# Patient Record
Sex: FEMALE | Race: WHITE | Employment: FULL TIME | URBAN - METROPOLITAN AREA
[De-identification: names, ages, dates, MRNs, and addresses within clinical notes are randomized per-mention and may not be internally consistent; named-entity substitution may affect disease eponyms.]

---

## 2019-07-26 ENCOUNTER — HOSPITAL ENCOUNTER (EMERGENCY)
Age: 29
Discharge: HOME | End: 2019-07-26
Payer: COMMERCIAL

## 2019-07-26 VITALS
HEIGHT: 67 IN | SYSTOLIC BLOOD PRESSURE: 133 MMHG | OXYGEN SATURATION: 100 % | HEART RATE: 84 BPM | DIASTOLIC BLOOD PRESSURE: 78 MMHG | TEMPERATURE: 98.4 F | RESPIRATION RATE: 16 BRPM | BODY MASS INDEX: 44.73 KG/M2 | WEIGHT: 285 LBS

## 2019-07-26 DIAGNOSIS — S39.012A BACK STRAIN, INITIAL ENCOUNTER: Primary | ICD-10-CM

## 2019-07-26 LAB
BACTERIA: ABNORMAL /HPF
BILIRUBIN URINE: NEGATIVE
BLOOD, URINE: ABNORMAL
CLARITY: CLEAR
COLOR: YELLOW
EPITHELIAL CELLS, UA: ABNORMAL /HPF
GLUCOSE URINE: NEGATIVE MG/DL
HCG(URINE) PREGNANCY TEST: NEGATIVE
KETONES, URINE: NEGATIVE MG/DL
LEUKOCYTE ESTERASE, URINE: NEGATIVE
MICROSCOPIC EXAMINATION: YES
NITRITE, URINE: NEGATIVE
PH UA: 6 (ref 5–8)
PROTEIN UA: NEGATIVE MG/DL
RBC UA: ABNORMAL /HPF (ref 0–2)
SPECIFIC GRAVITY UA: 1.02 (ref 1–1.03)
URINE TYPE: ABNORMAL
UROBILINOGEN, URINE: 0.2 E.U./DL
WBC UA: ABNORMAL /HPF (ref 0–5)

## 2019-07-26 PROCEDURE — 81001 URINALYSIS AUTO W/SCOPE: CPT

## 2019-07-26 PROCEDURE — 99283 EMERGENCY DEPT VISIT LOW MDM: CPT

## 2019-07-26 PROCEDURE — 84703 CHORIONIC GONADOTROPIN ASSAY: CPT

## 2019-07-26 PROCEDURE — 6370000000 HC RX 637 (ALT 250 FOR IP): Performed by: NURSE PRACTITIONER

## 2019-07-26 RX ORDER — NAPROXEN 250 MG/1
500 TABLET ORAL ONCE
Status: COMPLETED | OUTPATIENT
Start: 2019-07-26 | End: 2019-07-26

## 2019-07-26 RX ORDER — NAPROXEN 500 MG/1
500 TABLET ORAL 2 TIMES DAILY
Qty: 30 TABLET | Refills: 0 | Status: SHIPPED | OUTPATIENT
Start: 2019-07-26 | End: 2019-08-12 | Stop reason: ALTCHOICE

## 2019-07-26 RX ORDER — METHOCARBAMOL 750 MG/1
750 TABLET, FILM COATED ORAL ONCE
Status: COMPLETED | OUTPATIENT
Start: 2019-07-26 | End: 2019-07-26

## 2019-07-26 RX ORDER — METHOCARBAMOL 750 MG/1
750 TABLET, FILM COATED ORAL 4 TIMES DAILY
Qty: 40 TABLET | Refills: 0 | Status: SHIPPED | OUTPATIENT
Start: 2019-07-26 | End: 2019-08-05

## 2019-07-26 RX ORDER — PREDNISONE 20 MG/1
60 TABLET ORAL DAILY
Qty: 12 TABLET | Refills: 0 | Status: SHIPPED | OUTPATIENT
Start: 2019-07-26 | End: 2019-07-30

## 2019-07-26 RX ORDER — PREDNISONE 20 MG/1
60 TABLET ORAL ONCE
Status: COMPLETED | OUTPATIENT
Start: 2019-07-26 | End: 2019-07-26

## 2019-07-26 RX ADMIN — PREDNISONE 60 MG: 20 TABLET ORAL at 17:57

## 2019-07-26 RX ADMIN — METHOCARBAMOL TABLETS 750 MG: 750 TABLET, COATED ORAL at 17:57

## 2019-07-26 RX ADMIN — NAPROXEN 500 MG: 250 TABLET ORAL at 17:57

## 2019-07-26 ASSESSMENT — PAIN SCALES - GENERAL
PAINLEVEL_OUTOF10: 8
PAINLEVEL_OUTOF10: 8

## 2019-07-26 NOTE — ED PROVIDER NOTES
Inability: Not on file    Transportation needs:     Medical: Not on file     Non-medical: Not on file   Tobacco Use    Smoking status: Never Smoker    Smokeless tobacco: Never Used   Substance and Sexual Activity    Alcohol use: No    Drug use: No    Sexual activity: Not on file   Lifestyle    Physical activity:     Days per week: Not on file     Minutes per session: Not on file    Stress: Not on file   Relationships    Social connections:     Talks on phone: Not on file     Gets together: Not on file     Attends Latter day service: Not on file     Active member of club or organization: Not on file     Attends meetings of clubs or organizations: Not on file     Relationship status: Not on file    Intimate partner violence:     Fear of current or ex partner: Not on file     Emotionally abused: Not on file     Physically abused: Not on file     Forced sexual activity: Not on file   Other Topics Concern    Not on file   Social History Narrative    Not on file     No current facility-administered medications for this encounter. No current outpatient medications on file. No Known Allergies    REVIEW OF SYSTEMS  6 systems reviewed, pertinent positives per HPI otherwise noted to be negative    PHYSICAL EXAM  /78   Pulse 84   Temp 98.4 °F (36.9 °C)   Resp 16   Ht 5' 7\" (1.702 m)   Wt 285 lb (129.3 kg)   SpO2 100%   BMI 44.64 kg/m²   GENERAL APPEARANCE: Awake and alert. Cooperative. No acute distress. HEAD: Normocephalic. Atraumatic. EYES: PERRL. EOM's grossly intact. ENT: Mucous membranes are pink and moist.   NECK: Supple. Normal ROM. No midline bony tenderness, step off or crepitus. CHEST: Equal symmetric chest rise. LUNGS: Breathing is unlabored. Speaking comfortably in full sentences. Abdomen: Nondistended and non tender. EXTREMITIES: MAEE. No acute deformities. Neurovascularly intact. Brisk cap refill. No unilateral weakness.   Normal dorsi flexion and plantar flexion of

## 2019-08-12 ENCOUNTER — OFFICE VISIT (OUTPATIENT)
Dept: FAMILY MEDICINE CLINIC | Age: 29
End: 2019-08-12
Payer: COMMERCIAL

## 2019-08-12 ENCOUNTER — HOSPITAL ENCOUNTER (OUTPATIENT)
Age: 29
Discharge: HOME | End: 2019-08-12
Payer: COMMERCIAL

## 2019-08-12 ENCOUNTER — HOSPITAL ENCOUNTER (OUTPATIENT)
Dept: GENERAL RADIOLOGY | Age: 29
Discharge: HOME | End: 2019-08-12
Payer: COMMERCIAL

## 2019-08-12 VITALS
SYSTOLIC BLOOD PRESSURE: 114 MMHG | HEART RATE: 83 BPM | BODY MASS INDEX: 46.67 KG/M2 | OXYGEN SATURATION: 98 % | WEIGHT: 293 LBS | DIASTOLIC BLOOD PRESSURE: 82 MMHG

## 2019-08-12 DIAGNOSIS — M54.40 BACK PAIN OF LUMBAR REGION WITH SCIATICA: ICD-10-CM

## 2019-08-12 DIAGNOSIS — Z76.89 ENCOUNTER TO ESTABLISH CARE: Primary | ICD-10-CM

## 2019-08-12 DIAGNOSIS — E66.01 CLASS 3 SEVERE OBESITY WITHOUT SERIOUS COMORBIDITY WITH BODY MASS INDEX (BMI) OF 45.0 TO 49.9 IN ADULT, UNSPECIFIED OBESITY TYPE (HCC): ICD-10-CM

## 2019-08-12 PROCEDURE — G8417 CALC BMI ABV UP PARAM F/U: HCPCS | Performed by: PHYSICIAN ASSISTANT

## 2019-08-12 PROCEDURE — 72100 X-RAY EXAM L-S SPINE 2/3 VWS: CPT

## 2019-08-12 PROCEDURE — G8427 DOCREV CUR MEDS BY ELIG CLIN: HCPCS | Performed by: PHYSICIAN ASSISTANT

## 2019-08-12 PROCEDURE — 1036F TOBACCO NON-USER: CPT | Performed by: PHYSICIAN ASSISTANT

## 2019-08-12 PROCEDURE — 99203 OFFICE O/P NEW LOW 30 MIN: CPT | Performed by: PHYSICIAN ASSISTANT

## 2019-08-12 ASSESSMENT — ENCOUNTER SYMPTOMS
BOWEL INCONTINENCE: 0
TROUBLE SWALLOWING: 0
CHEST TIGHTNESS: 0
COUGH: 0
BACK PAIN: 1
ABDOMINAL PAIN: 0
SHORTNESS OF BREATH: 0

## 2019-08-12 ASSESSMENT — PATIENT HEALTH QUESTIONNAIRE - PHQ9
DEPRESSION UNABLE TO ASSESS: FUNCTIONAL CAPACITY MOTIVATION LIMITS ACCURACY
1. LITTLE INTEREST OR PLEASURE IN DOING THINGS: 0
SUM OF ALL RESPONSES TO PHQ QUESTIONS 1-9: 0
2. FEELING DOWN, DEPRESSED OR HOPELESS: 0
SUM OF ALL RESPONSES TO PHQ QUESTIONS 1-9: 0
SUM OF ALL RESPONSES TO PHQ9 QUESTIONS 1 & 2: 0

## 2019-08-12 NOTE — PROGRESS NOTES
Disease Maternal Grandmother     Heart Disease Maternal Grandfather     Breast Cancer Paternal Grandmother     Breast Cancer Paternal Grandfather        Vitals:    08/12/19 0853   BP: 114/82   Pulse: 83   SpO2: 98%   Weight: 298 lb (135.2 kg)     Estimated body mass index is 46.67 kg/m² as calculated from the following:    Height as of 7/26/19: 5' 7\" (1.702 m). Weight as of this encounter: 298 lb (135.2 kg). Physical Exam   Constitutional: She is oriented to person, place, and time. She appears well-developed and well-nourished. HENT:   Head: Normocephalic and atraumatic. Mouth/Throat: Oropharynx is clear and moist.   Eyes: Conjunctivae are normal.   Neck: Normal range of motion. Neck supple. No thyromegaly present. Cardiovascular: Normal rate, regular rhythm and normal heart sounds. Exam reveals no friction rub. No murmur heard. Pulmonary/Chest: Effort normal and breath sounds normal.   Abdominal: Soft. Bowel sounds are normal. There is no guarding. Neurological: She is alert and oriented to person, place, and time. No cranial nerve deficit. Skin: No pallor. Psychiatric: She has a normal mood and affect. Her behavior is normal. Judgment and thought content normal.   Vitals reviewed. ASSESSMENT/PLAN:  1. Back pain of lumbar region with sciatica  - [facility] - [place]  - XR LUMBAR SPINE (2-3 VIEWS); Future    2. Class 3 severe obesity without serious comorbidity with body mass index (BMI) of 45.0 to 49.9 in adult, unspecified obesity type ([facility] [address]  - COMPREHENSIVE METABOLIC PANEL; Future  - CBC; Future  - TSH with Reflex; Future  - LIPID PANEL; Future      No follow-ups on file. An  electronic signature was used to authenticate this note.     --LAURENCE Little on 8/12/2019 at 9:33 AM

## 2019-08-13 LAB
A/G RATIO: 1.6 (ref 1.1–2.2)
ALBUMIN SERPL-MCNC: 4.6 G/DL (ref 3.4–5)
ALP BLD-CCNC: 58 U/L (ref 40–129)
ALT SERPL-CCNC: 15 U/L (ref 10–40)
ANION GAP SERPL CALCULATED.3IONS-SCNC: 18 MMOL/L (ref 3–16)
AST SERPL-CCNC: 15 U/L (ref 15–37)
BILIRUB SERPL-MCNC: 2 MG/DL (ref 0–1)
BUN BLDV-MCNC: 13 MG/DL (ref 7–20)
CALCIUM SERPL-MCNC: 9.5 MG/DL (ref 8.3–10.6)
CHLORIDE BLD-SCNC: 102 MMOL/L (ref 99–110)
CHOLESTEROL, TOTAL: 154 MG/DL (ref 0–199)
CO2: 21 MMOL/L (ref 21–32)
CREAT SERPL-MCNC: <0.5 MG/DL (ref 0.6–1.1)
GFR AFRICAN AMERICAN: >60
GFR NON-AFRICAN AMERICAN: >60
GLOBULIN: 2.8 G/DL
GLUCOSE BLD-MCNC: 104 MG/DL (ref 70–99)
HCT VFR BLD CALC: 42 % (ref 36–48)
HDLC SERPL-MCNC: 48 MG/DL (ref 40–60)
HEMOGLOBIN: 14 G/DL (ref 12–16)
LDL CHOLESTEROL CALCULATED: 89 MG/DL
MCH RBC QN AUTO: 28.9 PG (ref 26–34)
MCHC RBC AUTO-ENTMCNC: 33.4 G/DL (ref 31–36)
MCV RBC AUTO: 86.6 FL (ref 80–100)
PDW BLD-RTO: 14.1 % (ref 12.4–15.4)
PLATELET # BLD: 277 K/UL (ref 135–450)
PMV BLD AUTO: 8.7 FL (ref 5–10.5)
POTASSIUM SERPL-SCNC: 4.1 MMOL/L (ref 3.5–5.1)
RBC # BLD: 4.85 M/UL (ref 4–5.2)
SODIUM BLD-SCNC: 141 MMOL/L (ref 136–145)
TOTAL PROTEIN: 7.4 G/DL (ref 6.4–8.2)
TRIGL SERPL-MCNC: 85 MG/DL (ref 0–150)
TSH REFLEX: 3.9 UIU/ML (ref 0.27–4.2)
VLDLC SERPL CALC-MCNC: 17 MG/DL
WBC # BLD: 7.7 K/UL (ref 4–11)

## 2019-08-20 DIAGNOSIS — M25.561 CHRONIC PAIN OF RIGHT KNEE: Primary | ICD-10-CM

## 2019-08-20 DIAGNOSIS — G89.29 CHRONIC PAIN OF RIGHT KNEE: Primary | ICD-10-CM

## 2019-09-12 ENCOUNTER — OFFICE VISIT (OUTPATIENT)
Dept: FAMILY MEDICINE CLINIC | Age: 29
End: 2019-09-12
Payer: COMMERCIAL

## 2019-09-12 VITALS
OXYGEN SATURATION: 98 % | WEIGHT: 293 LBS | DIASTOLIC BLOOD PRESSURE: 80 MMHG | SYSTOLIC BLOOD PRESSURE: 110 MMHG | TEMPERATURE: 99.3 F | HEART RATE: 71 BPM | BODY MASS INDEX: 45.89 KG/M2

## 2019-09-12 DIAGNOSIS — J02.9 SORE THROAT: ICD-10-CM

## 2019-09-12 DIAGNOSIS — J22 ACUTE LOWER RESPIRATORY TRACT INFECTION: Primary | ICD-10-CM

## 2019-09-12 PROCEDURE — 87880 STREP A ASSAY W/OPTIC: CPT | Performed by: PHYSICIAN ASSISTANT

## 2019-09-12 PROCEDURE — 1036F TOBACCO NON-USER: CPT | Performed by: PHYSICIAN ASSISTANT

## 2019-09-12 PROCEDURE — G8427 DOCREV CUR MEDS BY ELIG CLIN: HCPCS | Performed by: PHYSICIAN ASSISTANT

## 2019-09-12 PROCEDURE — 99213 OFFICE O/P EST LOW 20 MIN: CPT | Performed by: PHYSICIAN ASSISTANT

## 2019-09-12 PROCEDURE — G8417 CALC BMI ABV UP PARAM F/U: HCPCS | Performed by: PHYSICIAN ASSISTANT

## 2019-09-12 RX ORDER — AMOXICILLIN AND CLAVULANATE POTASSIUM 875; 125 MG/1; MG/1
1 TABLET, FILM COATED ORAL 2 TIMES DAILY
Qty: 14 TABLET | Refills: 0 | Status: SHIPPED | OUTPATIENT
Start: 2019-09-12 | End: 2019-09-19

## 2019-09-12 RX ORDER — METHYLPREDNISOLONE 4 MG/1
TABLET ORAL
Qty: 1 KIT | Refills: 0 | Status: SHIPPED | OUTPATIENT
Start: 2019-09-12 | End: 2019-09-18

## 2019-09-12 RX ORDER — IPRATROPIUM BROMIDE 21 UG/1
2 SPRAY, METERED NASAL 3 TIMES DAILY
Qty: 1 BOTTLE | Refills: 3 | Status: SHIPPED | OUTPATIENT
Start: 2019-09-12 | End: 2019-11-14

## 2019-09-12 ASSESSMENT — ENCOUNTER SYMPTOMS
EYE ITCHING: 0
SINUS PRESSURE: 0
SHORTNESS OF BREATH: 0
COUGH: 1
EYE DISCHARGE: 0
RHINORRHEA: 0
VOICE CHANGE: 1
WHEEZING: 0
FACIAL SWELLING: 0
VOMITING: 0
SORE THROAT: 1
TROUBLE SWALLOWING: 0
NAUSEA: 0
SINUS PAIN: 0

## 2019-09-12 NOTE — PROGRESS NOTES
Elva Angela 30 y.o. female    Chief Complaint   Patient presents with    Pharyngitis     x's 2 weeks    Cough    Fever     only 2 in the past 2 weeks        Cough   This is a new problem. The current episode started in the past 7 days. The problem has been unchanged. The problem occurs hourly. The cough is productive of sputum. Associated symptoms include a fever, nasal congestion, postnasal drip and a sore throat. Pertinent negatives include no chest pain, chills, ear congestion, ear pain, headaches, myalgias, rash, rhinorrhea, shortness of breath, sweats or wheezing. The symptoms are aggravated by lying down. She has tried body position changes and rest for the symptoms. URI    This is a new problem. The current episode started in the past 7 days. The problem has been unchanged. There has been no fever. Associated symptoms include congestion, coughing and a sore throat. Pertinent negatives include no chest pain, ear pain, headaches, nausea, rash, rhinorrhea, sinus pain, sneezing, vomiting or wheezing. She has tried decongestant for the symptoms. Current Outpatient Medications:     methylPREDNISolone (MEDROL, ISHMAEL,) 4 MG tablet, Take by mouth as directed., Disp: 1 kit, Rfl: 0    amoxicillin-clavulanate (AUGMENTIN) 875-125 MG per tablet, Take 1 tablet by mouth 2 times daily for 7 days, Disp: 14 tablet, Rfl: 0    ipratropium (ATROVENT) 0.03 % nasal spray, 2 sprays by Nasal route 3 times daily, Disp: 1 Bottle, Rfl: 3    levonorgestrel (MIRENA) IUD 52 mg, 1 each by Intrauterine route once, Disp: , Rfl:       Vitals:    09/12/19 1012   BP: 110/80   Pulse: 71   Temp: 99.3 °F (37.4 °C)   SpO2: 98%       Review of Systems   Constitutional: Positive for fever. Negative for activity change, appetite change and chills. HENT: Positive for congestion, postnasal drip, sore throat and voice change.  Negative for ear discharge, ear pain, facial swelling, rhinorrhea, sinus pressure, sinus pain, sneezing

## 2019-09-15 LAB
ORGANISM: ABNORMAL
THROAT CULTURE: ABNORMAL
THROAT CULTURE: ABNORMAL

## 2019-10-16 ENCOUNTER — HOSPITAL ENCOUNTER (OUTPATIENT)
Age: 29
Discharge: HOME | End: 2019-10-16
Payer: COMMERCIAL

## 2019-10-16 ENCOUNTER — HOSPITAL ENCOUNTER (OUTPATIENT)
Dept: GENERAL RADIOLOGY | Age: 29
Discharge: HOME | End: 2019-10-16
Payer: COMMERCIAL

## 2019-10-16 DIAGNOSIS — G89.29 CHRONIC PAIN OF RIGHT KNEE: ICD-10-CM

## 2019-10-16 DIAGNOSIS — M25.561 CHRONIC PAIN OF RIGHT KNEE: ICD-10-CM

## 2019-10-16 PROCEDURE — 73562 X-RAY EXAM OF KNEE 3: CPT

## 2019-11-14 ENCOUNTER — OFFICE VISIT (OUTPATIENT)
Dept: FAMILY MEDICINE CLINIC | Age: 29
End: 2019-11-14
Payer: COMMERCIAL

## 2019-11-14 VITALS
HEIGHT: 67 IN | BODY MASS INDEX: 45.99 KG/M2 | HEART RATE: 90 BPM | DIASTOLIC BLOOD PRESSURE: 80 MMHG | WEIGHT: 293 LBS | TEMPERATURE: 98 F | OXYGEN SATURATION: 99 % | SYSTOLIC BLOOD PRESSURE: 110 MMHG

## 2019-11-14 DIAGNOSIS — R07.9 CHEST PAIN, UNSPECIFIED TYPE: ICD-10-CM

## 2019-11-14 DIAGNOSIS — Z23 NEED FOR INFLUENZA VACCINATION: ICD-10-CM

## 2019-11-14 DIAGNOSIS — F51.04 PSYCHOPHYSIOLOGICAL INSOMNIA: ICD-10-CM

## 2019-11-14 DIAGNOSIS — G43.009 MIGRAINE WITHOUT AURA AND WITHOUT STATUS MIGRAINOSUS, NOT INTRACTABLE: ICD-10-CM

## 2019-11-14 DIAGNOSIS — F41.9 ANXIETY AND DEPRESSION: Primary | ICD-10-CM

## 2019-11-14 DIAGNOSIS — F32.A ANXIETY AND DEPRESSION: Primary | ICD-10-CM

## 2019-11-14 PROCEDURE — 99214 OFFICE O/P EST MOD 30 MIN: CPT | Performed by: PHYSICIAN ASSISTANT

## 2019-11-14 PROCEDURE — 90686 IIV4 VACC NO PRSV 0.5 ML IM: CPT | Performed by: PHYSICIAN ASSISTANT

## 2019-11-14 PROCEDURE — 90471 IMMUNIZATION ADMIN: CPT | Performed by: PHYSICIAN ASSISTANT

## 2019-11-14 PROCEDURE — G8417 CALC BMI ABV UP PARAM F/U: HCPCS | Performed by: PHYSICIAN ASSISTANT

## 2019-11-14 PROCEDURE — G8427 DOCREV CUR MEDS BY ELIG CLIN: HCPCS | Performed by: PHYSICIAN ASSISTANT

## 2019-11-14 PROCEDURE — 93000 ELECTROCARDIOGRAM COMPLETE: CPT | Performed by: PHYSICIAN ASSISTANT

## 2019-11-14 PROCEDURE — G8482 FLU IMMUNIZE ORDER/ADMIN: HCPCS | Performed by: PHYSICIAN ASSISTANT

## 2019-11-14 PROCEDURE — 1036F TOBACCO NON-USER: CPT | Performed by: PHYSICIAN ASSISTANT

## 2019-11-14 RX ORDER — CITALOPRAM 10 MG/1
10 TABLET ORAL DAILY
Qty: 30 TABLET | Refills: 5 | Status: SHIPPED | OUTPATIENT
Start: 2019-11-14 | End: 2020-01-02 | Stop reason: SDUPTHER

## 2019-11-14 RX ORDER — HYDROXYZINE HYDROCHLORIDE 25 MG/1
25 TABLET, FILM COATED ORAL EVERY 8 HOURS PRN
Qty: 60 TABLET | Refills: 3 | Status: SHIPPED | OUTPATIENT
Start: 2019-11-14 | End: 2019-12-14

## 2019-11-14 RX ORDER — SUMATRIPTAN 50 MG/1
50 TABLET, FILM COATED ORAL
Qty: 9 TABLET | Refills: 5 | Status: SHIPPED | OUTPATIENT
Start: 2019-11-14 | End: 2020-03-11

## 2019-11-15 ASSESSMENT — ENCOUNTER SYMPTOMS
PHOTOPHOBIA: 1
EYE PAIN: 0
SHORTNESS OF BREATH: 0
COUGH: 0

## 2019-12-26 ENCOUNTER — OFFICE VISIT (OUTPATIENT)
Dept: FAMILY MEDICINE CLINIC | Age: 29
End: 2019-12-26
Payer: COMMERCIAL

## 2019-12-26 VITALS
WEIGHT: 293 LBS | BODY MASS INDEX: 46.99 KG/M2 | HEART RATE: 75 BPM | OXYGEN SATURATION: 98 % | SYSTOLIC BLOOD PRESSURE: 100 MMHG | DIASTOLIC BLOOD PRESSURE: 70 MMHG

## 2019-12-26 DIAGNOSIS — M54.6 CHRONIC MIDLINE THORACIC BACK PAIN: ICD-10-CM

## 2019-12-26 DIAGNOSIS — N62 LARGE BREASTS: ICD-10-CM

## 2019-12-26 DIAGNOSIS — G89.29 CHRONIC MIDLINE THORACIC BACK PAIN: ICD-10-CM

## 2019-12-26 DIAGNOSIS — M54.16 LUMBAR BACK PAIN WITH RADICULOPATHY AFFECTING LOWER EXTREMITY: Primary | ICD-10-CM

## 2019-12-26 PROCEDURE — 99213 OFFICE O/P EST LOW 20 MIN: CPT | Performed by: PHYSICIAN ASSISTANT

## 2019-12-26 PROCEDURE — G8417 CALC BMI ABV UP PARAM F/U: HCPCS | Performed by: PHYSICIAN ASSISTANT

## 2019-12-26 PROCEDURE — G8427 DOCREV CUR MEDS BY ELIG CLIN: HCPCS | Performed by: PHYSICIAN ASSISTANT

## 2019-12-26 PROCEDURE — G8482 FLU IMMUNIZE ORDER/ADMIN: HCPCS | Performed by: PHYSICIAN ASSISTANT

## 2019-12-26 PROCEDURE — 1036F TOBACCO NON-USER: CPT | Performed by: PHYSICIAN ASSISTANT

## 2019-12-26 RX ORDER — MELOXICAM 7.5 MG/1
7.5 TABLET ORAL DAILY PRN
Qty: 30 TABLET | Refills: 5 | Status: SHIPPED | OUTPATIENT
Start: 2019-12-26 | End: 2020-03-11

## 2019-12-26 ASSESSMENT — ENCOUNTER SYMPTOMS
WHEEZING: 0
COUGH: 0
COLOR CHANGE: 0
SHORTNESS OF BREATH: 0
BACK PAIN: 1

## 2020-01-02 ENCOUNTER — OFFICE VISIT (OUTPATIENT)
Dept: FAMILY MEDICINE CLINIC | Age: 30
End: 2020-01-02
Payer: COMMERCIAL

## 2020-01-02 VITALS
WEIGHT: 293 LBS | HEART RATE: 82 BPM | OXYGEN SATURATION: 98 % | BODY MASS INDEX: 47.93 KG/M2 | DIASTOLIC BLOOD PRESSURE: 80 MMHG | SYSTOLIC BLOOD PRESSURE: 100 MMHG

## 2020-01-02 PROBLEM — F32.A ANXIETY AND DEPRESSION: Status: ACTIVE | Noted: 2020-01-02

## 2020-01-02 PROBLEM — G43.009 MIGRAINE WITHOUT AURA AND WITHOUT STATUS MIGRAINOSUS, NOT INTRACTABLE: Status: ACTIVE | Noted: 2020-01-02

## 2020-01-02 PROBLEM — F41.9 ANXIETY AND DEPRESSION: Status: ACTIVE | Noted: 2020-01-02

## 2020-01-02 PROCEDURE — G8417 CALC BMI ABV UP PARAM F/U: HCPCS | Performed by: PHYSICIAN ASSISTANT

## 2020-01-02 PROCEDURE — G8482 FLU IMMUNIZE ORDER/ADMIN: HCPCS | Performed by: PHYSICIAN ASSISTANT

## 2020-01-02 PROCEDURE — 99214 OFFICE O/P EST MOD 30 MIN: CPT | Performed by: PHYSICIAN ASSISTANT

## 2020-01-02 PROCEDURE — 1036F TOBACCO NON-USER: CPT | Performed by: PHYSICIAN ASSISTANT

## 2020-01-02 PROCEDURE — 96160 PT-FOCUSED HLTH RISK ASSMT: CPT | Performed by: PHYSICIAN ASSISTANT

## 2020-01-02 PROCEDURE — G8427 DOCREV CUR MEDS BY ELIG CLIN: HCPCS | Performed by: PHYSICIAN ASSISTANT

## 2020-01-02 RX ORDER — CITALOPRAM 20 MG/1
20 TABLET ORAL DAILY
Qty: 30 TABLET | Refills: 5 | Status: SHIPPED | OUTPATIENT
Start: 2020-01-02 | End: 2020-03-11

## 2020-01-02 RX ORDER — TOPIRAMATE 25 MG/1
TABLET ORAL
Qty: 42 TABLET | Refills: 0 | Status: SHIPPED | OUTPATIENT
Start: 2020-01-02 | End: 2020-03-11

## 2020-01-02 ASSESSMENT — PATIENT HEALTH QUESTIONNAIRE - PHQ9
9. THOUGHTS THAT YOU WOULD BE BETTER OFF DEAD, OR OF HURTING YOURSELF: 0
SUM OF ALL RESPONSES TO PHQ QUESTIONS 1-9: 19
10. IF YOU CHECKED OFF ANY PROBLEMS, HOW DIFFICULT HAVE THESE PROBLEMS MADE IT FOR YOU TO DO YOUR WORK, TAKE CARE OF THINGS AT HOME, OR GET ALONG WITH OTHER PEOPLE: 3
3. TROUBLE FALLING OR STAYING ASLEEP: 3
8. MOVING OR SPEAKING SO SLOWLY THAT OTHER PEOPLE COULD HAVE NOTICED. OR THE OPPOSITE, BEING SO FIGETY OR RESTLESS THAT YOU HAVE BEEN MOVING AROUND A LOT MORE THAN USUAL: 0
1. LITTLE INTEREST OR PLEASURE IN DOING THINGS: 2
SUM OF ALL RESPONSES TO PHQ9 QUESTIONS 1 & 2: 4
6. FEELING BAD ABOUT YOURSELF - OR THAT YOU ARE A FAILURE OR HAVE LET YOURSELF OR YOUR FAMILY DOWN: 3
4. FEELING TIRED OR HAVING LITTLE ENERGY: 3
SUM OF ALL RESPONSES TO PHQ QUESTIONS 1-9: 19
5. POOR APPETITE OR OVEREATING: 3
7. TROUBLE CONCENTRATING ON THINGS, SUCH AS READING THE NEWSPAPER OR WATCHING TELEVISION: 3
2. FEELING DOWN, DEPRESSED OR HOPELESS: 2

## 2020-01-02 NOTE — PROGRESS NOTES
2020     Chapin Veliz (:  1990) is a 30 y.o. female, here for evaluation of the following medical concerns:    HPI  Mood Disorder:  Patient presents for follow-up of depression and anxiety disorder. Current complaints include: anhedonia, depressed mood, tearfulness, feelings of hopelessness, insomnia and restlessness. She has been experiencing some passive SI towards her uncle's death. She denies any other symptoms. Symptoms/signs of lori: none. External stressors: death in late November of Uncle that raised her. Current treatment includes: Celexa- 10 mg. Medication side effects: none. She has not noticed any difference in her mood. Hydroxyzine was unhelpful for anxiety. She has an upcoming appointment with Dr. Lui Reinoso at the end of the month. Migraine:  Still having 2-3 per week. No improvement with celexa which we were hoping would help as they seem triggered by mood/stress. Imitrex is helpful though. Review of Systems   Constitutional: Positive for fatigue. Negative for activity change and appetite change. Neurological: Positive for headaches. Negative for dizziness and light-headedness. Psychiatric/Behavioral: Positive for decreased concentration, dysphoric mood and sleep disturbance. Negative for agitation, behavioral problems, confusion, hallucinations, self-injury and suicidal ideas. The patient is nervous/anxious. The patient is not hyperactive. Prior to Visit Medications    Medication Sig Taking? Authorizing Provider   citalopram (CELEXA) 20 MG tablet Take 1 tablet by mouth daily Yes LAURENCE Finley   topiramate (TOPAMAX) 25 MG tablet Take 1 tablet by mouth nightly for 14 days, THEN 2 tablets nightly for 14 days.  Yes LAURENCE Finley   meloxicam (MOBIC) 7.5 MG tablet Take 1 tablet by mouth daily as needed for Pain Yes LAURENCE Finley   levonorgestrel (MIRENA) IUD 52 mg 1 each by Intrauterine route once Yes Historical Provider, MD   SUMAtriptan (IMITREX) 50 MG tablet Take 1 tablet by mouth once as needed for Migraine May repeat dose in 2 hours if needed  Clearnce Older, PA        Social History     Tobacco Use    Smoking status: Never Smoker    Smokeless tobacco: Never Used   Substance Use Topics    Alcohol use: No        Vitals:    01/02/20 1135   BP: 100/80   Pulse: 82   SpO2: 98%   Weight: (!) 306 lb (138.8 kg)     Estimated body mass index is 47.93 kg/m² as calculated from the following:    Height as of 11/14/19: 5' 7\" (1.702 m). Weight as of this encounter: 306 lb (138.8 kg). Physical Exam  Vitals signs reviewed. Constitutional:       Appearance: Normal appearance. Neurological:      Mental Status: She is alert. Psychiatric:         Attention and Perception: Attention and perception normal.         Mood and Affect: Affect normal. Mood is depressed. Speech: Speech normal.         Behavior: Behavior normal. Behavior is cooperative. Thought Content: Thought content normal.         Cognition and Memory: Cognition and memory normal.         ASSESSMENT/PLAN:  1. Anxiety and depression  -  Will increase the Celexa today. Follow up in 3-6 months. - citalopram (CELEXA) 20 MG tablet; Take 1 tablet by mouth daily  Dispense: 30 tablet; Refill: 5    2. Grief reaction  -  Appropriate grief reaction. I feel that this is worsening depressive symptoms which is likely why she noticed no change with initial dose of celexa. 3. Migraine without aura and without status migrainosus, not intractable  -  Start Topamax ramp up. Discussed the benefits, risks and side effects of medication with the patient. Goal is 1-2 migraines per month. -  Pt to e-mail me through [address] at the end of the 4 weeks and let me know how the 50 mg dose is working.   - topiramate (TOPAMAX) 25 MG tablet; Take 1 tablet by mouth nightly for 14 days, THEN 2 tablets nightly for 14 days. Dispense: 42 tablet;  Refill: 0      Return in about 6 months (around 7/2/2020)

## 2020-01-03 ENCOUNTER — TELEPHONE (OUTPATIENT)
Dept: SURGERY | Age: 30
End: 2020-01-03

## 2020-01-06 ENCOUNTER — HOSPITAL ENCOUNTER (OUTPATIENT)
Dept: MRI IMAGING | Age: 30
Discharge: HOME | End: 2020-01-06
Payer: COMMERCIAL

## 2020-01-06 PROCEDURE — 72146 MRI CHEST SPINE W/O DYE: CPT

## 2020-01-06 PROCEDURE — 72148 MRI LUMBAR SPINE W/O DYE: CPT

## 2020-01-15 ENCOUNTER — OFFICE VISIT (OUTPATIENT)
Dept: PSYCHOLOGY | Age: 30
End: 2020-01-15
Payer: COMMERCIAL

## 2020-01-15 PROBLEM — F41.0 PANIC DISORDER: Status: ACTIVE | Noted: 2020-01-15

## 2020-01-15 PROBLEM — F41.1 GAD (GENERALIZED ANXIETY DISORDER): Status: ACTIVE | Noted: 2020-01-15

## 2020-01-15 PROCEDURE — 90791 PSYCH DIAGNOSTIC EVALUATION: CPT | Performed by: PSYCHOLOGIST

## 2020-01-15 NOTE — PROGRESS NOTES
Not on file     Attends meetings of clubs or organizations: Not on file     Relationship status: Not on file    Intimate partner violence:     Fear of current or ex partner: Not on file     Emotionally abused: Not on file     Physically abused: Not on file     Forced sexual activity: Not on file   Other Topics Concern    Not on file   Social History Narrative    Not on file       TOBACCO:   reports that she has never smoked. She has never used smokeless tobacco.  ETOH:   reports no history of alcohol use. Family History:   Family History   Problem Relation Age of Onset    Other Mother         hyperactive thyroid    Other Maternal Aunt         hyperactive thyroid    Heart Disease Maternal Grandmother     Heart Disease Maternal Grandfather     Breast Cancer Paternal Grandmother     Breast Cancer Paternal Grandfather          A:  Ms. Nelson Thomson has a longstanding history of depression and anxiety. Over the past few months anxiety has worsened and she has begun to experience panic attacks. Ms. Nelson Thomson has also been experiencing grief over the loss of her uncle in November. Ms. Nelson Thomson was active and engaged throughout the visit and responded positively to behavioral interventions. Diagnosis:    1. Panic disorder    2. DANIEL (generalized anxiety disorder)    3. Depressive disorder    4.  Grief           Plan:  Pt interventions:    Established rapport, Discussed [facility] model of care vs specialty mental health, Conducted functional assessment, [place]-setting to identify pt's primary goals for [facility] visit / overall health, Supportive techniques, Provided psychoeducation re: stress/anxiety response, optimal anxiety, role of breathing on emotions, false alarm of anxiety, Discussed potential treatments for  depression and anxiety, Provided education on the use of medication to treat  depression and anxiety, Discussed various factors related to the development and maintenance of  depression and anxiety (including

## 2020-01-15 NOTE — PATIENT INSTRUCTIONS
\"The entire autonomic nervous system (and through it, our internal organs and glands) is largely driven by our breathing patterns. By changing our breathing we can influence millions of biochemical reactions in our body, producing more relaxing substances such as endorphins and fewer anxiety-producing ones like adrenaline and higher blood acidity. Mindfulness of the breath is so effective that it is common to all meditative and prayer traditions. \" Anxiety Fear & Breathing - Breathing. com    \"When overcoming high levels of anxiety, it is important to learn the techniques of correct breathing. Many people who live with high levels of anxiety are known to breathe through their chest. Shallow breathing through the chest means you are disrupting the balance of oxygen and carbon dioxide necessary to be in a relaxed state. This type of breathing will perpetuate the symptoms of anxiety. \" [employer]. com      Diaphragmatic Breathing  ( You can download the free gwendolyn,  [id number], to practice)           _____________________________________________________________________________  1. Sit in a comfortable position    2. Place one hand on your stomach and the other on your chest    3. Try to breathe so that only your stomach rises and falls    As you inhale, concentrate on your chest remaining relatively still while your stomach rises. It may be helpful for you to imagine that your pants are too big and you need to push your stomach out to hold them up. When exhaling, allow your stomach to fall in and the air to fully escape. Inhale slowly. You may choose to hold the air in for about a second. Exhale slowly. Dont push the air out, but just let the natural pressure of your body slowly move it out.     It is normal for this healthy method of breathing to feel a little awkward at first.  With practice, it will feel more natural.    4. Get your mind on your side    One other important factor in getting relaxed is your mind.  Your mind and body are connected. The mind influences the body and the body influences the mind. What you do with your mind when you are trying to relax is very important. The key is to avoid thinking about stressful things. You can think about      Neutral things (e.g., counting, saying a word like calm or relax)   Pleasant things (e.g., imagining a pleasant place)    5. It is recommended that you practice 2 times per day, 10 minutes each time.

## 2020-02-04 NOTE — PROGRESS NOTES
HENT: Negative for congestion, facial swelling, and voice change. Eyes: Negative for photophobia and visual disturbance. Respiratory: Negative for apnea, cough, chest tightness and shortness of breath. Cardiovascular: Negative for chest pain and palpitations. Gastrointestinal: Negative for dysphagia and early satiety. Genitourinary: Negative for difficulty urinating, dysuria, flank pain, frequency and hematuria. Musculoskeletal: Negative for new gait problem, joint swelling and myalgias. Skin: Negative for color change, pallor and rash. Endocrine: negative for tremors, temperature intolerance or polydipsia. Allergic/Immunologic: Negative for new environmental or food allergies. Neurological: Negative for dizziness, seizures, speech difficulty, numbness. Hematological: Negative for adenopathy. Psychiatric/Behavioral: Negative for agitation and confusion. EXAM     BP (!) 145/92   Pulse 80   Temp 98 °F (36.7 °C)   Resp 16   Ht 5' 7\" (1.702 m)   Wt (!) 302 lb (137 kg)   SpO2 100%   BMI 47.30 kg/m²     GEN: NAD, pleasant, obese  CVS: RRR  PULM: No respiratory distress  HEENT: PERRLA/EOMI; dentition appropriate, hearing appears within normal limits  NECK: Supple with trachea in midline, no masses  EXT: No lymphedema noted  ABD: soft/NT/obese  NEURO: No focal deficits, no obvious CN deficits  BACK: Bilateral latiss muscle intact  BREAST: Left larger than Right   R  Ptosis thgthrthathdtheth:th th4th Palpable masses: No     Nipple retraction: No     Palpable axillary lymphadenopathy: No     SN-N: 40 cm     N-IMF: 15.2 cm     Breast width: 16.4 cm         L  Ptosis thgthrthathdtheth:th th4th Palpable masses: No     Nipple retraction: No     Palpable axillary lymphadenopathy: No     SN-N: 42.8 cm     N-IMF: 15.8 cm     Breast width: 16.8 cm      Estimated Reduction Volume (Schnur scale): 1275 grams (each)    RADIOLOGY: None    IMP: 30 y.o. female with symptomatic macromastia  PLAN:Would benefit from breast reduction. However, given her weight, would not recommend reduction at this time. Will refer to [facility] for bariatrics evaluation. Once she has been able to lose her weight to get her BMI ~35, will re-evaluate for potential reduction if her symptoms persist.    A discussion regarding surgical options including: reduction mammaplasty was performed with the patien. Her symptoms of macromastia were discussed in detail and that surgical intervention is focused primarily on relieving upper torso complaints. Clinical photos were obtained. Additionally,discussion regarding the risks including, but not limited to: bleeding (potentially requiring transfusion or reoperation), infection, seroma, reoperation, poor cosmetic outcome, scarring, revisional surgery, nipple loss/complication, nipple malposition, diminished sensation, inability to breastfeed, VTE (DVT/PE), and death was performed. All questions were answered in a satisfactory manner.      Rolanda Pyle MD  [facility]  02/05/20

## 2020-02-05 ENCOUNTER — OFFICE VISIT (OUTPATIENT)
Dept: SURGERY | Age: 30
End: 2020-02-05
Payer: COMMERCIAL

## 2020-02-05 VITALS
RESPIRATION RATE: 16 BRPM | WEIGHT: 293 LBS | DIASTOLIC BLOOD PRESSURE: 92 MMHG | SYSTOLIC BLOOD PRESSURE: 145 MMHG | TEMPERATURE: 98 F | BODY MASS INDEX: 45.99 KG/M2 | HEART RATE: 80 BPM | OXYGEN SATURATION: 100 % | HEIGHT: 67 IN

## 2020-02-05 PROCEDURE — 1036F TOBACCO NON-USER: CPT | Performed by: SURGERY

## 2020-02-05 PROCEDURE — G8482 FLU IMMUNIZE ORDER/ADMIN: HCPCS | Performed by: SURGERY

## 2020-02-05 PROCEDURE — 99204 OFFICE O/P NEW MOD 45 MIN: CPT | Performed by: SURGERY

## 2020-02-05 PROCEDURE — G8427 DOCREV CUR MEDS BY ELIG CLIN: HCPCS | Performed by: SURGERY

## 2020-02-05 PROCEDURE — G8417 CALC BMI ABV UP PARAM F/U: HCPCS | Performed by: SURGERY

## 2020-03-04 ENCOUNTER — TELEPHONE (OUTPATIENT)
Dept: BARIATRICS/WEIGHT MGMT | Age: 30
End: 2020-03-04

## 2020-03-11 ENCOUNTER — OFFICE VISIT (OUTPATIENT)
Dept: BARIATRICS/WEIGHT MGMT | Age: 30
End: 2020-03-11
Payer: COMMERCIAL

## 2020-03-11 VITALS
DIASTOLIC BLOOD PRESSURE: 89 MMHG | BODY MASS INDEX: 45.99 KG/M2 | HEART RATE: 79 BPM | WEIGHT: 293 LBS | SYSTOLIC BLOOD PRESSURE: 130 MMHG | HEIGHT: 67 IN

## 2020-03-11 PROCEDURE — G8482 FLU IMMUNIZE ORDER/ADMIN: HCPCS | Performed by: SURGERY

## 2020-03-11 PROCEDURE — 1036F TOBACCO NON-USER: CPT | Performed by: SURGERY

## 2020-03-11 PROCEDURE — G8417 CALC BMI ABV UP PARAM F/U: HCPCS | Performed by: SURGERY

## 2020-03-11 PROCEDURE — G8427 DOCREV CUR MEDS BY ELIG CLIN: HCPCS | Performed by: SURGERY

## 2020-03-11 PROCEDURE — 99204 OFFICE O/P NEW MOD 45 MIN: CPT | Performed by: SURGERY

## 2020-04-08 ENCOUNTER — TELEMEDICINE (OUTPATIENT)
Dept: BARIATRICS/WEIGHT MGMT | Age: 30
End: 2020-04-08
Payer: COMMERCIAL

## 2020-04-08 VITALS — WEIGHT: 291.8 LBS | BODY MASS INDEX: 45.7 KG/M2

## 2020-04-08 PROCEDURE — 1036F TOBACCO NON-USER: CPT | Performed by: SURGERY

## 2020-04-08 PROCEDURE — G8417 CALC BMI ABV UP PARAM F/U: HCPCS | Performed by: SURGERY

## 2020-04-08 PROCEDURE — 99214 OFFICE O/P EST MOD 30 MIN: CPT | Performed by: SURGERY

## 2020-04-08 PROCEDURE — G8428 CUR MEDS NOT DOCUMENT: HCPCS | Performed by: SURGERY

## 2020-04-08 NOTE — PROGRESS NOTES
negative  Hematological and Lymphatic ROS: negative  Endocrine ROS: negative  Breast ROS: negative  Respiratory ROS: negative  Cardiovascular ROS: negative  Gastrointestinal ROS:negative  Genito-Urinary ROS: negative  Musculoskeletal ROS: negative   Skin ROS: negative       Physical Exam  Deferred       A/P    Ramiro Goldstein is 30 y.o. female, Body mass index is 45.7 kg/m². pre surgery, has lost 15# since last visit. The patient underwent dietary counseling with myself / or registered dietitian. I have reviewed, discussed and agree with the dietary plan. Patient is trying hard to keep good dietary and behavior modifications. Patient is monitoring portion sizes, food choices and liquid calories. Patient is trying to exercise regularly as much as possible. We discussed how her weight affects her overall health including:  Patient Active Problem List   Diagnosis    Migraine without aura and without status migrainosus, not intractable    Anxiety and depression    Panic disorder    DANIEL (generalized anxiety disorder)    and importance of weight loss to alleviate those co morbid conditions. I encouraged the patient to continue exercise and keeping healthy eating habits. Discussed pre-op labs and work up till now. Also counseled the patient extensively on Surgery. I spent a total of 25 minutes via telemedicine (Video) with the patient and greater than 50% of the time was spent in counseling, answering questions, addressing concerns, reviewing labs and/or other studies with the patient as well as coordinating care. RTC in 4 weeks  Obtain rest of pre-op work up / clearances  Healthy Diet and Exercise      Patient advised that its their responsibility to follow up for their care, studies, referrals and/or labs ordered today.        Pursuant to the emergency declaration under the [facility] [address] waiver authority and the [employer] Supplemental Appropriations Act, this Virtual Visit was conducted, with patient's consent, to reduce the patient's risk of exposure to COVID-19 and provide continuity of care for an established patient. Services were provided through a video synchronous discussion virtually to substitute for in-person clinic visit.

## 2020-04-08 NOTE — PATIENT INSTRUCTIONS
Due to the COVID-19 restrictions on close contact interactions the patient's visit was conducted via telephone in carmen of a face to face visit. The patient is here through telemedicine for their 2nd presurgical visit.

## 2020-05-06 ENCOUNTER — TELEMEDICINE (OUTPATIENT)
Dept: BARIATRICS/WEIGHT MGMT | Age: 30
End: 2020-05-06
Payer: COMMERCIAL

## 2020-05-06 VITALS — BODY MASS INDEX: 43.07 KG/M2 | WEIGHT: 275 LBS

## 2020-05-06 PROCEDURE — 1036F TOBACCO NON-USER: CPT | Performed by: SURGERY

## 2020-05-06 PROCEDURE — G8428 CUR MEDS NOT DOCUMENT: HCPCS | Performed by: SURGERY

## 2020-05-06 PROCEDURE — G8417 CALC BMI ABV UP PARAM F/U: HCPCS | Performed by: SURGERY

## 2020-05-06 PROCEDURE — 99213 OFFICE O/P EST LOW 20 MIN: CPT | Performed by: SURGERY

## 2020-05-07 NOTE — PROGRESS NOTES
Used   Substance Use Topics    Alcohol use: Yes     Comment: 2 x/ yr     I counseled the patient on the importance of not smoking and risks of ETOH. No Known Allergies  Vitals:    05/06/20 0912   Weight: 275 lb (124.7 kg)       Body mass index is 43.07 kg/m².     Lab Results   Component Value Date    WBC 7.7 08/12/2019    RBC 4.85 08/12/2019    HGB 14.0 08/12/2019    HCT 42.0 08/12/2019    MCV 86.6 08/12/2019    MCH 28.9 08/12/2019    MCHC 33.4 08/12/2019    MPV 8.7 08/12/2019    NEUTOPHILPCT 64.9 03/24/2014    LYMPHOPCT 26.5 03/24/2014    MONOPCT 7.0 03/24/2014    EOSRELPCT 1.3 03/24/2014    BASOPCT 0.3 03/24/2014    NEUTROABS 8.2 03/24/2014    LYMPHSABS 3.4 03/24/2014    MONOSABS 0.9 03/24/2014    EOSABS 0.2 03/24/2014     Lab Results   Component Value Date     08/12/2019    K 4.1 08/12/2019     08/12/2019    CO2 21 08/12/2019    ANIONGAP 18 08/12/2019    GLUCOSE 104 08/12/2019    BUN 13 08/12/2019    CREATININE <0.5 08/12/2019    LABGLOM >60 08/12/2019    GFRAA >60 08/12/2019    CALCIUM 9.5 08/12/2019    PROT 7.4 08/12/2019    LABALBU 4.6 08/12/2019    AGRATIO 1.6 08/12/2019    BILITOT 2.0 08/12/2019    ALKPHOS 58 08/12/2019    ALT 15 08/12/2019    AST 15 08/12/2019    GLOB 2.8 08/12/2019     Lab Results   Component Value Date    CHOL 154 08/12/2019    TRIG 85 08/12/2019    HDL 48 08/12/2019    LDLCALC 89 08/12/2019    LABVLDL 17 08/12/2019     Lab Results   Component Value Date    TSHREFLEX 3.90 08/12/2019     No results found for: IRON, TIBC, LABIRON  No results found for: HSXGDPXL24, FOLATE  No results found for: VITD25  No results found for: LABA1C, EAG      Current Outpatient Medications:     levonorgestrel (MIRENA) IUD 52 mg, 1 each by Intrauterine route once, Disp: , Rfl:     Review of Systems - History obtained from the patient  General ROS: negative  Psychological ROS: negative  Ophthalmic ROS: negative  Neurological ROS: negative  ENT ROS: negative  Allergy and Immunology ROS: declaration under the [facility], [facility] waiver authority and the [employer] Act, this Virtual Visit was conducted, with patient's consent, to reduce the patient's risk of exposure to COVID-19 and provide continuity of care for an established patient. Services were provided through a video synchronous discussion virtually to substitute for in-person clinic visit.

## 2020-05-19 ENCOUNTER — TELEPHONE (OUTPATIENT)
Dept: BARIATRICS/WEIGHT MGMT | Age: 30
End: 2020-05-19

## 2020-05-19 NOTE — TELEPHONE ENCOUNTER
Left a message at pt's home and mobile number to call be back to set up her June EGD. Called her work phone, but she is not there right now.

## 2020-06-03 ENCOUNTER — OFFICE VISIT (OUTPATIENT)
Dept: PRIMARY CARE CLINIC | Age: 30
End: 2020-06-03

## 2020-06-05 ENCOUNTER — ANESTHESIA EVENT (OUTPATIENT)
Dept: ENDOSCOPY | Age: 30
End: 2020-06-05
Payer: COMMERCIAL

## 2020-06-06 LAB
SARS-COV-2: NOT DETECTED
SOURCE: NORMAL

## 2020-06-08 ENCOUNTER — HOSPITAL ENCOUNTER (OUTPATIENT)
Age: 30
Setting detail: OUTPATIENT SURGERY
Discharge: HOME | End: 2020-06-08
Attending: SURGERY | Admitting: SURGERY
Payer: COMMERCIAL

## 2020-06-08 ENCOUNTER — ANESTHESIA (OUTPATIENT)
Dept: ENDOSCOPY | Age: 30
End: 2020-06-08
Payer: COMMERCIAL

## 2020-06-08 VITALS
BODY MASS INDEX: 44.1 KG/M2 | OXYGEN SATURATION: 100 % | DIASTOLIC BLOOD PRESSURE: 80 MMHG | HEIGHT: 67 IN | WEIGHT: 281 LBS | SYSTOLIC BLOOD PRESSURE: 116 MMHG | RESPIRATION RATE: 18 BRPM | TEMPERATURE: 97.4 F | HEART RATE: 63 BPM

## 2020-06-08 VITALS
SYSTOLIC BLOOD PRESSURE: 113 MMHG | RESPIRATION RATE: 11 BRPM | DIASTOLIC BLOOD PRESSURE: 71 MMHG | OXYGEN SATURATION: 99 %

## 2020-06-08 LAB — PREGNANCY, URINE: NEGATIVE

## 2020-06-08 PROCEDURE — 88305 TISSUE EXAM BY PATHOLOGIST: CPT

## 2020-06-08 PROCEDURE — 43239 EGD BIOPSY SINGLE/MULTIPLE: CPT | Performed by: SURGERY

## 2020-06-08 PROCEDURE — 2580000003 HC RX 258: Performed by: ANESTHESIOLOGY

## 2020-06-08 PROCEDURE — 3700000000 HC ANESTHESIA ATTENDED CARE: Performed by: SURGERY

## 2020-06-08 PROCEDURE — 84703 CHORIONIC GONADOTROPIN ASSAY: CPT

## 2020-06-08 PROCEDURE — 2500000003 HC RX 250 WO HCPCS: Performed by: NURSE ANESTHETIST, CERTIFIED REGISTERED

## 2020-06-08 PROCEDURE — 7100000010 HC PHASE II RECOVERY - FIRST 15 MIN: Performed by: SURGERY

## 2020-06-08 PROCEDURE — 3609012400 HC EGD TRANSORAL BIOPSY SINGLE/MULTIPLE: Performed by: SURGERY

## 2020-06-08 PROCEDURE — 7100000011 HC PHASE II RECOVERY - ADDTL 15 MIN: Performed by: SURGERY

## 2020-06-08 PROCEDURE — 2709999900 HC NON-CHARGEABLE SUPPLY: Performed by: SURGERY

## 2020-06-08 PROCEDURE — 6360000002 HC RX W HCPCS: Performed by: NURSE ANESTHETIST, CERTIFIED REGISTERED

## 2020-06-08 RX ORDER — PROPOFOL 10 MG/ML
INJECTION, EMULSION INTRAVENOUS PRN
Status: DISCONTINUED | OUTPATIENT
Start: 2020-06-08 | End: 2020-06-08 | Stop reason: SDUPTHER

## 2020-06-08 RX ORDER — LIDOCAINE HYDROCHLORIDE 20 MG/ML
INJECTION, SOLUTION INFILTRATION; PERINEURAL PRN
Status: DISCONTINUED | OUTPATIENT
Start: 2020-06-08 | End: 2020-06-08 | Stop reason: SDUPTHER

## 2020-06-08 RX ORDER — SODIUM CHLORIDE, SODIUM LACTATE, POTASSIUM CHLORIDE, CALCIUM CHLORIDE 600; 310; 30; 20 MG/100ML; MG/100ML; MG/100ML; MG/100ML
INJECTION, SOLUTION INTRAVENOUS CONTINUOUS
Status: DISCONTINUED | OUTPATIENT
Start: 2020-06-08 | End: 2020-06-08 | Stop reason: HOSPADM

## 2020-06-08 RX ADMIN — LIDOCAINE HYDROCHLORIDE 100 MG: 20 INJECTION, SOLUTION INFILTRATION; PERINEURAL at 12:47

## 2020-06-08 RX ADMIN — PROPOFOL 100 MG: 10 INJECTION, EMULSION INTRAVENOUS at 12:47

## 2020-06-08 RX ADMIN — PROPOFOL 50 MG: 10 INJECTION, EMULSION INTRAVENOUS at 12:49

## 2020-06-08 RX ADMIN — PROPOFOL 50 MG: 10 INJECTION, EMULSION INTRAVENOUS at 12:51

## 2020-06-08 RX ADMIN — SODIUM CHLORIDE, SODIUM LACTATE, POTASSIUM CHLORIDE, AND CALCIUM CHLORIDE: 600; 310; 30; 20 INJECTION, SOLUTION INTRAVENOUS at 10:16

## 2020-06-08 RX ADMIN — SODIUM CHLORIDE, SODIUM LACTATE, POTASSIUM CHLORIDE, AND CALCIUM CHLORIDE: 600; 310; 30; 20 INJECTION, SOLUTION INTRAVENOUS at 12:47

## 2020-06-08 RX ADMIN — PROPOFOL 100 MG: 10 INJECTION, EMULSION INTRAVENOUS at 12:50

## 2020-06-08 ASSESSMENT — PULMONARY FUNCTION TESTS
PIF_VALUE: 0
PIF_VALUE: 2
PIF_VALUE: 0

## 2020-06-08 ASSESSMENT — PAIN - FUNCTIONAL ASSESSMENT: PAIN_FUNCTIONAL_ASSESSMENT: 0-10

## 2020-06-08 NOTE — ANESTHESIA POSTPROCEDURE EVALUATION
Department of Anesthesiology  Postprocedure Note    Patient: Bin Root  MRN: [id number]  YOB: 1990  Date of evaluation: 6/8/2020  Time:  1:41 PM     Procedure Summary     Date:  06/08/20 Room / Location:  [facility]    Anesthesia Start:  1243 Anesthesia Stop:  1254    Procedure:  EGD BIOPSY (N/A ) Diagnosis:       Pre-operative clearance      (Pre-operative clearance [Z01.818])    Surgeon: Aurelia Valdez DO Responsible Provider:  Taylor Llamas MD    Anesthesia Type:  MAC ASA Status:  2          Anesthesia Type: MAC    Olivia Phase I: Olivia Score: 10    Olivia Phase II: Olivia Score: 10    Last vitals: Reviewed and per EMR flowsheets.        Anesthesia Post Evaluation    Patient location during evaluation: bedside  Patient participation: complete - patient participated  Level of consciousness: awake  Pain score: 0  Airway patency: patent  Nausea & Vomiting: no nausea and no vomiting  Complications: no  Cardiovascular status: hemodynamically stable  Respiratory status: acceptable  Hydration status: euvolemic

## 2020-06-08 NOTE — H&P
[facility]) Physicians   General & Laparoscopic Surgery  [employer]        HPI:     Hortensia Bautista is a very pleasant 30 y.o. obese female ,   Body mass index is 48.05 kg/m².             Past Medical History  Morbid Obesity        Past Surgical History         Past Surgical History:   Procedure Laterality Date    CHOLECYSTECTOMY             Family History         Family History   Problem Relation Age of Onset    Other Mother           hyperactive thyroid    Other Maternal Aunt           hyperactive thyroid    Heart Disease Maternal Grandmother      Hypertension Maternal Grandmother      Elevated Lipids Maternal Grandmother      Heart Disease Maternal Grandfather      Hypertension Maternal Grandfather      Elevated Lipids Maternal Grandfather      Breast Cancer Paternal Grandmother           Social History            Tobacco Use    Smoking status: Never Smoker    Smokeless tobacco: Never Used   Substance Use Topics    Alcohol use: Yes       Comment: 2 x/ yr      I counseled the patient on the importance of not smoking and risks of ETOH.    No Known Allergies  Vitals       Vitals:     03/11/20 0938   BP: 130/89   Pulse: 79   Weight: (!) 306 lb 12.8 oz (139.2 kg)   Height: 5' 7\" (1.702 m)            Body mass index is 48.05 kg/m².       Current Medication      Current Outpatient Medications:     levonorgestrel (MIRENA) IUD 52 mg, 1 each by Intrauterine route once, Disp: , Rfl:            Review of Systems - History obtained from the patient  General ROS: negative  Psychological ROS: negative  Ophthalmic ROS: negative  Neurological ROS: negative  ENT ROS: negative  Allergy and Immunology ROS: negative  Hematological and Lymphatic ROS: negative  Endocrine ROS: negative  Breast ROS: negative  Respiratory ROS: negative  Cardiovascular ROS: negative  Gastrointestinal ROS:negative  Genito-Urinary ROS: negative  Musculoskeletal ROS: negative   Skin ROS: negative     Physical Exam

## 2020-06-08 NOTE — ANESTHESIA PRE PROCEDURE
Department of Anesthesiology  Preprocedure Note       Name:  Ingrid Mays   Age:  30 y.o.  :  1990                                          MRN:  [id number]         Date:  2020      Surgeon: Kae Lester): Zafar Blackburn DO    Procedure: Procedure(s):  ESOPHAGOGASTRODUODENOSCOPY    Medications prior to admission:   Prior to Admission medications    Medication Sig Start Date End Date Taking? Authorizing Provider   levonorgestrel (MIRENA) IUD 52 mg 1 each by Intrauterine route once   Yes Historical Provider, MD       Current medications:    Current Facility-Administered Medications   Medication Dose Route Frequency Provider Last Rate Last Dose    lactated ringers infusion   Intravenous Continuous Marjorie House  mL/hr at 20 1016         Allergies:  No Known Allergies    Problem List:    Patient Active Problem List   Diagnosis Code    Migraine without aura and without status migrainosus, not intractable G43.009    Anxiety and depression F41.9, F32.9    Panic disorder F41.0    DANIEL (generalized anxiety disorder) F41.1       Past Medical History:  History reviewed. No pertinent past medical history.     Past Surgical History:        Procedure Laterality Date    CHOLECYSTECTOMY         Social History:    Social History     Tobacco Use    Smoking status: Never Smoker    Smokeless tobacco: Never Used   Substance Use Topics    Alcohol use: Yes     Comment: 2 x/ yr                                Counseling given: Not Answered      Vital Signs (Current):   Vitals:    20 0942   BP: 118/78   Pulse: 80   Resp: 16   Temp: 97.4 °F (36.3 °C)   TempSrc: Temporal   SpO2: 99%   Weight: 281 lb (127.5 kg)   Height: 5' 7\" (1.702 m)                                              BP Readings from Last 3 Encounters:   20 118/78   20 130/89   20 (!) 145/92       NPO Status: Time of last liquid consumption:                         Time of last solid consumption:  Date of last liquid consumption: 06/07/20                        Date of last solid food consumption: 06/07/20    BMI:   Wt Readings from Last 3 Encounters:   06/08/20 281 lb (127.5 kg)   05/06/20 275 lb (124.7 kg)   04/08/20 291 lb 12.8 oz (132.4 kg)     Body mass index is 44.01 kg/m². CBC:   Lab Results   Component Value Date    WBC 7.7 08/12/2019    RBC 4.85 08/12/2019    HGB 14.0 08/12/2019    HCT 42.0 08/12/2019    MCV 86.6 08/12/2019    RDW 14.1 08/12/2019     08/12/2019       CMP:   Lab Results   Component Value Date     08/12/2019    K 4.1 08/12/2019     08/12/2019    CO2 21 08/12/2019    BUN 13 08/12/2019    CREATININE <0.5 08/12/2019    GFRAA >60 08/12/2019    AGRATIO 1.6 08/12/2019    LABGLOM >60 08/12/2019    GLUCOSE 104 08/12/2019    PROT 7.4 08/12/2019    CALCIUM 9.5 08/12/2019    BILITOT 2.0 08/12/2019    ALKPHOS 58 08/12/2019    AST 15 08/12/2019    ALT 15 08/12/2019       POC Tests: No results for input(s): POCGLU, POCNA, POCK, POCCL, POCBUN, POCHEMO, POCHCT in the last 72 hours.     Coags: No results found for: PROTIME, INR, APTT    HCG (If Applicable):   Lab Results   Component Value Date    PREGTESTUR Negative 06/08/2020        ABGs: No results found for: PHART, PO2ART, FGI3TQJ, KCD3EBJ, BEART, Z1BNPBSB     Type & Screen (If Applicable):  No results found for: LABABO, LABRH    Drug/Infectious Status (If Applicable):  No results found for: HIV, HEPCAB    COVID-19 Screening (If Applicable):   Lab Results   Component Value Date    COVID19 Not Detected 06/03/2020         Anesthesia Evaluation    Airway: Mallampati: II  TM distance: >3 FB   Neck ROM: full  Mouth opening: > = 3 FB Dental:          Pulmonary:       (-) asthma                           Cardiovascular:  Exercise tolerance: good (>4 METS),       (-) hypertension, past MI,  angina and  LOWE                Neuro/Psych:   (+) psychiatric history:   (-) seizures           GI/Hepatic/Renal:   (+) GERD:, morbid obesity Endo/Other:        (-) diabetes mellitus               Abdominal:           Vascular:                                        Anesthesia Plan      MAC     ASA 2     (-npo MN  -denies any cardiac history, no chest pain or palpitations)  Induction: intravenous. Anesthetic plan and risks discussed with patient. Plan discussed with CRNA.     Attending anesthesiologist reviewed and agrees with Pre Eval content              Ana Bartholomew MD   6/8/2020

## 2020-06-22 ENCOUNTER — TELEPHONE (OUTPATIENT)
Dept: BARIATRICS/WEIGHT MGMT | Age: 30
End: 2020-06-22

## 2020-06-29 ENCOUNTER — TELEMEDICINE (OUTPATIENT)
Dept: BARIATRICS/WEIGHT MGMT | Age: 30
End: 2020-06-29
Payer: COMMERCIAL

## 2020-06-29 PROCEDURE — G8417 CALC BMI ABV UP PARAM F/U: HCPCS | Performed by: SURGERY

## 2020-06-29 PROCEDURE — 99213 OFFICE O/P EST LOW 20 MIN: CPT | Performed by: SURGERY

## 2020-06-29 PROCEDURE — G8428 CUR MEDS NOT DOCUMENT: HCPCS | Performed by: SURGERY

## 2020-06-29 PROCEDURE — 1036F TOBACCO NON-USER: CPT | Performed by: SURGERY

## 2020-06-29 NOTE — PROGRESS NOTES
status: Never Smoker    Smokeless tobacco: Never Used   Substance Use Topics    Alcohol use: Yes     Comment: 2 x/ yr     I counseled the patient on the importance of not smoking and risks of ETOH. No Known Allergies  There were no vitals filed for this visit.       Lab Results   Component Value Date    WBC 7.7 08/12/2019    RBC 4.85 08/12/2019    HGB 14.0 08/12/2019    HCT 42.0 08/12/2019    MCV 86.6 08/12/2019    MCH 28.9 08/12/2019    MCHC 33.4 08/12/2019    MPV 8.7 08/12/2019    NEUTOPHILPCT 64.9 03/24/2014    LYMPHOPCT 26.5 03/24/2014    MONOPCT 7.0 03/24/2014    EOSRELPCT 1.3 03/24/2014    BASOPCT 0.3 03/24/2014    NEUTROABS 8.2 03/24/2014    LYMPHSABS 3.4 03/24/2014    MONOSABS 0.9 03/24/2014    EOSABS 0.2 03/24/2014     Lab Results   Component Value Date     08/12/2019    K 4.1 08/12/2019     08/12/2019    CO2 21 08/12/2019    ANIONGAP 18 08/12/2019    GLUCOSE 104 08/12/2019    BUN 13 08/12/2019    CREATININE <0.5 08/12/2019    LABGLOM >60 08/12/2019    GFRAA >60 08/12/2019    CALCIUM 9.5 08/12/2019    PROT 7.4 08/12/2019    LABALBU 4.6 08/12/2019    AGRATIO 1.6 08/12/2019    BILITOT 2.0 08/12/2019    ALKPHOS 58 08/12/2019    ALT 15 08/12/2019    AST 15 08/12/2019    GLOB 2.8 08/12/2019     Lab Results   Component Value Date    CHOL 154 08/12/2019    TRIG 85 08/12/2019    HDL 48 08/12/2019    LDLCALC 89 08/12/2019    LABVLDL 17 08/12/2019     Lab Results   Component Value Date    TSHREFLEX 3.90 08/12/2019     No results found for: IRON, TIBC, LABIRON  No results found for: YKDUAUDB58, FOLATE  No results found for: VITD25  No results found for: LABA1C, EAG      Current Outpatient Medications:     levonorgestrel (MIRENA) IUD 52 mg, 1 each by Intrauterine route once, Disp: , Rfl:     Review of Systems - History obtained from the patient  General ROS: negative  Psychological ROS: negative  Ophthalmic ROS: negative  Neurological ROS: negative  ENT ROS: negative  Allergy and Immunology ROS: negative  Hematological and Lymphatic ROS: negative  Endocrine ROS: negative  Breast ROS: negative  Respiratory ROS: negative  Cardiovascular ROS: negative  Gastrointestinal ROS:negative  Genito-Urinary ROS: negative  Musculoskeletal ROS: negative   Skin ROS: negative       Physical Exam  Deferred       A/P    Aliya Whyte is 30 y.o. female, pre surgery, has gained 3# since last visit. The patient underwent dietary counseling with myself / or registered dietitian. I have reviewed, discussed and agree with the dietary plan. Patient is trying hard to keep good dietary and behavior modifications. Patient is monitoring portion sizes, food choices and liquid calories. Patient is trying to exercise regularly as much as possible. We discussed how her weight affects her overall health including:  Patient Active Problem List   Diagnosis    Migraine without aura and without status migrainosus, not intractable    Anxiety and depression    Panic disorder    DANIEL (generalized anxiety disorder)    and importance of weight loss to alleviate those co morbid conditions. I encouraged the patient to continue exercise and keeping healthy eating habits. Discussed pre-op labs and work up till now. Also counseled the patient extensively on Surgery. Obesity as a disease is considered a high risk to patients overall health and should therefore be considered a high risk disease state. I spent a total of 15 minutes via telemedicine (Video) with the patient and greater than 50% of the time was spent in counseling, answering questions, addressing concerns, reviewing labs and/or other studies with the patient as well as coordinating care. RTC in 4 weeks  Obtain rest of pre-op work up / clearances  Healthy Diet and Exercise      Patient advised that its their responsibility to follow up for their care, studies, referrals and/or labs ordered today.        Pursuant to the emergency declaration under the [address] and the [employer] Act, [facility] waiver authority and the Coronavirus Preparedness and [employer] Act, this Virtual Visit was conducted, with patient's consent, to reduce the patient's risk of exposure to COVID-19 and provide continuity of care for an established patient. Services were provided through a video synchronous discussion virtually to substitute for in-person clinic visit.

## 2020-07-21 DIAGNOSIS — E66.01 MORBID OBESITY WITH BMI OF 45.0-49.9, ADULT (HCC): ICD-10-CM

## 2020-07-21 LAB
A/G RATIO: 1.8 (ref 1.1–2.2)
ALBUMIN SERPL-MCNC: 4.4 G/DL (ref 3.4–5)
ALP BLD-CCNC: 52 U/L (ref 40–129)
ALT SERPL-CCNC: 15 U/L (ref 10–40)
ANION GAP SERPL CALCULATED.3IONS-SCNC: 13 MMOL/L (ref 3–16)
AST SERPL-CCNC: 14 U/L (ref 15–37)
BASOPHILS ABSOLUTE: 0.1 K/UL (ref 0–0.2)
BASOPHILS RELATIVE PERCENT: 0.9 %
BILIRUB SERPL-MCNC: 1.5 MG/DL (ref 0–1)
BUN BLDV-MCNC: 14 MG/DL (ref 7–20)
CALCIUM SERPL-MCNC: 9.1 MG/DL (ref 8.3–10.6)
CHLORIDE BLD-SCNC: 105 MMOL/L (ref 99–110)
CHOLESTEROL, TOTAL: 119 MG/DL (ref 0–199)
CO2: 22 MMOL/L (ref 21–32)
CREAT SERPL-MCNC: 0.6 MG/DL (ref 0.6–1.1)
EOSINOPHILS ABSOLUTE: 0.2 K/UL (ref 0–0.6)
EOSINOPHILS RELATIVE PERCENT: 2.6 %
FOLATE: 11.04 NG/ML (ref 4.78–24.2)
GFR AFRICAN AMERICAN: >60
GFR NON-AFRICAN AMERICAN: >60
GLOBULIN: 2.4 G/DL
GLUCOSE BLD-MCNC: 107 MG/DL (ref 70–99)
HCT VFR BLD CALC: 40.4 % (ref 36–48)
HDLC SERPL-MCNC: 40 MG/DL (ref 40–60)
HEMOGLOBIN: 13.6 G/DL (ref 12–16)
IRON SATURATION: 23 % (ref 15–50)
IRON: 71 UG/DL (ref 37–145)
LDL CHOLESTEROL CALCULATED: 68 MG/DL
LYMPHOCYTES ABSOLUTE: 2.9 K/UL (ref 1–5.1)
LYMPHOCYTES RELATIVE PERCENT: 35 %
MCH RBC QN AUTO: 29.2 PG (ref 26–34)
MCHC RBC AUTO-ENTMCNC: 33.7 G/DL (ref 31–36)
MCV RBC AUTO: 86.7 FL (ref 80–100)
MONOCYTES ABSOLUTE: 0.8 K/UL (ref 0–1.3)
MONOCYTES RELATIVE PERCENT: 9.4 %
NEUTROPHILS ABSOLUTE: 4.4 K/UL (ref 1.7–7.7)
NEUTROPHILS RELATIVE PERCENT: 52.1 %
PDW BLD-RTO: 13.6 % (ref 12.4–15.4)
PLATELET # BLD: 263 K/UL (ref 135–450)
PMV BLD AUTO: 8.7 FL (ref 5–10.5)
POTASSIUM SERPL-SCNC: 4.3 MMOL/L (ref 3.5–5.1)
RBC # BLD: 4.66 M/UL (ref 4–5.2)
SODIUM BLD-SCNC: 140 MMOL/L (ref 136–145)
TOTAL IRON BINDING CAPACITY: 314 UG/DL (ref 260–445)
TOTAL PROTEIN: 6.8 G/DL (ref 6.4–8.2)
TRIGL SERPL-MCNC: 55 MG/DL (ref 0–150)
TSH REFLEX: 2.62 UIU/ML (ref 0.27–4.2)
VITAMIN B-12: 330 PG/ML (ref 211–911)
VITAMIN D 25-HYDROXY: 34.1 NG/ML
VLDLC SERPL CALC-MCNC: 11 MG/DL
WBC # BLD: 8.4 K/UL (ref 4–11)

## 2020-07-22 ENCOUNTER — TELEMEDICINE (OUTPATIENT)
Dept: BARIATRICS/WEIGHT MGMT | Age: 30
End: 2020-07-22
Payer: COMMERCIAL

## 2020-07-22 LAB
ESTIMATED AVERAGE GLUCOSE: 114 MG/DL
HBA1C MFR BLD: 5.6 %

## 2020-07-22 PROCEDURE — G8417 CALC BMI ABV UP PARAM F/U: HCPCS | Performed by: SURGERY

## 2020-07-22 PROCEDURE — G8428 CUR MEDS NOT DOCUMENT: HCPCS | Performed by: SURGERY

## 2020-07-22 PROCEDURE — 1036F TOBACCO NON-USER: CPT | Performed by: SURGERY

## 2020-07-22 PROCEDURE — 99213 OFFICE O/P EST LOW 20 MIN: CPT | Performed by: SURGERY

## 2020-07-22 NOTE — PROGRESS NOTES
[facility] ([facility]) Physicians   [employer] [facility] [employer]  Shantell Jacobson DO       TELEHEALTH EVALUATION -- Audio/Visual (During XTCIQ-94 public health emergency)           Chief Complaint   Patient presents with    Weight Management       HPI:       Due to the COVID-19 pandemic restrictions on close contact interactions as recommended by CDC and health authorities, the patient's visit was conducted via telemedicine in lieu of a face to face visit. Patient verbally consented and agreed to proceed. The patient is here through telemedicine for their bariatric surgery presurgical visit for future weight loss. Richard Huff is a very pleasant 30 y.o. female with Chronic Obesity. Joe García has been struggling for several years now with obesity. Joe García feels the weight is an obstacle to achieve and perform things in daily living as well risk on health. Patient  is very determined to lose weight and be healthy, and is working towards  surgical weight loss to achieve this goal. Pre-operative clearance and work up pending. Working hard to keep good dietary habits as well level of activity. Patient denies any nausea, vomiting, fevers, chills, shortness of breath, chest pain, cough, constipation or difficulty urinating. No past medical history on file.   Past Surgical History:   Procedure Laterality Date    CHOLECYSTECTOMY      UPPER GASTROINTESTINAL ENDOSCOPY N/A 6/8/2020    EGD BIOPSY performed by Shantell Jacobson DO at [facility] ENDOSCOPY     Family History   Problem Relation Age of Onset    Other Mother         hyperactive thyroid    Other Maternal Aunt         hyperactive thyroid    Heart Disease Maternal Grandmother     Hypertension Maternal Grandmother     Elevated Lipids Maternal Grandmother     Heart Disease Maternal Grandfather     Hypertension Maternal Grandfather     Elevated Lipids Maternal Grandfather     Breast Cancer Paternal Grandmother      Social History     Tobacco Use    Smoking status: Never Smoker    Smokeless tobacco: Never Used   Substance Use Topics    Alcohol use: Yes     Comment: 2 x/ yr     I counseled the patient on the importance of not smoking and risks of ETOH. No Known Allergies  There were no vitals filed for this visit.       Lab Results   Component Value Date    WBC 8.4 07/21/2020    RBC 4.66 07/21/2020    HGB 13.6 07/21/2020    HCT 40.4 07/21/2020    MCV 86.7 07/21/2020    MCH 29.2 07/21/2020    MCHC 33.7 07/21/2020    MPV 8.7 07/21/2020    NEUTOPHILPCT 52.1 07/21/2020    LYMPHOPCT 35.0 07/21/2020    MONOPCT 9.4 07/21/2020    EOSRELPCT 2.6 07/21/2020    BASOPCT 0.9 07/21/2020    NEUTROABS 4.4 07/21/2020    LYMPHSABS 2.9 07/21/2020    MONOSABS 0.8 07/21/2020    EOSABS 0.2 07/21/2020     Lab Results   Component Value Date     07/21/2020    K 4.3 07/21/2020     07/21/2020    CO2 22 07/21/2020    ANIONGAP 13 07/21/2020    GLUCOSE 107 07/21/2020    BUN 14 07/21/2020    CREATININE 0.6 07/21/2020    LABGLOM >60 07/21/2020    GFRAA >60 07/21/2020    CALCIUM 9.1 07/21/2020    PROT 6.8 07/21/2020    LABALBU 4.4 07/21/2020    AGRATIO 1.8 07/21/2020    BILITOT 1.5 07/21/2020    ALKPHOS 52 07/21/2020    ALT 15 07/21/2020    AST 14 07/21/2020    GLOB 2.4 07/21/2020     Lab Results   Component Value Date    CHOL 119 07/21/2020    TRIG 55 07/21/2020    HDL 40 07/21/2020    LDLCALC 68 07/21/2020    LABVLDL 11 07/21/2020     Lab Results   Component Value Date    TSHREFLEX 2.62 07/21/2020     Lab Results   Component Value Date    IRON 71 07/21/2020    TIBC 314 07/21/2020    LABIRON 23 07/21/2020     Lab Results   Component Value Date    MYHQJYSQ41 330 07/21/2020    FOLATE 11.04 07/21/2020     Lab Results   Component Value Date    VITD25 34.1 07/21/2020     Lab Results   Component Value Date    LABA1C 5.6 07/21/2020    .0 07/21/2020         Current Outpatient Medications:     levonorgestrel (MIRENA) IUD 52 mg, 1 each by Intrauterine route once, Disp: , Rfl:     Review of Systems - History obtained from the patient  General ROS: negative  Psychological ROS: negative  Ophthalmic ROS: negative  Neurological ROS: negative  ENT ROS: negative  Allergy and Immunology ROS: negative  Hematological and Lymphatic ROS: negative  Endocrine ROS: negative  Breast ROS: negative  Respiratory ROS: negative  Cardiovascular ROS: negative  Gastrointestinal ROS:negative  Genito-Urinary ROS: negative  Musculoskeletal ROS: negative   Skin ROS: negative       Physical Exam  Deferred       A/P    Emili Rojas is 30 y.o. female, pre surgery, has lost 7# since last visit. The patient underwent dietary counseling with myself / or registered dietitian. I have reviewed, discussed and agree with the dietary plan. Patient is trying hard to keep good dietary and behavior modifications. Patient is monitoring portion sizes, food choices and liquid calories. Patient is trying to exercise regularly as much as possible. We discussed how her weight affects her overall health including:  Patient Active Problem List   Diagnosis    Migraine without aura and without status migrainosus, not intractable    Anxiety and depression    Panic disorder    DANIEL (generalized anxiety disorder)    and importance of weight loss to alleviate those co morbid conditions. I encouraged the patient to continue exercise and keeping healthy eating habits. Discussed pre-op labs and work up till now. Also counseled the patient extensively on Surgery. Obesity as a disease is considered a high risk to patients overall health and should therefore be considered a high risk disease state. I spent a total of 15 minutes via telemedicine (Video) with the patient and greater than 50% of the time was spent in counseling, answering questions, addressing concerns, reviewing labs and/or other studies with the patient as well as coordinating care.        RTC in 4 weeks  Obtain rest of pre-op work up / clearances  Healthy Diet and Exercise

## 2020-08-10 ENCOUNTER — OFFICE VISIT (OUTPATIENT)
Dept: FAMILY MEDICINE CLINIC | Age: 30
End: 2020-08-10
Payer: COMMERCIAL

## 2020-08-10 VITALS
RESPIRATION RATE: 14 BRPM | DIASTOLIC BLOOD PRESSURE: 88 MMHG | HEART RATE: 100 BPM | TEMPERATURE: 99.4 F | SYSTOLIC BLOOD PRESSURE: 120 MMHG | OXYGEN SATURATION: 100 %

## 2020-08-10 PROCEDURE — 99213 OFFICE O/P EST LOW 20 MIN: CPT | Performed by: PHYSICIAN ASSISTANT

## 2020-08-10 PROCEDURE — G8427 DOCREV CUR MEDS BY ELIG CLIN: HCPCS | Performed by: PHYSICIAN ASSISTANT

## 2020-08-10 PROCEDURE — 1036F TOBACCO NON-USER: CPT | Performed by: PHYSICIAN ASSISTANT

## 2020-08-10 PROCEDURE — G8417 CALC BMI ABV UP PARAM F/U: HCPCS | Performed by: PHYSICIAN ASSISTANT

## 2020-08-10 RX ORDER — BENZONATATE 100 MG/1
100 CAPSULE ORAL 3 TIMES DAILY PRN
Qty: 20 CAPSULE | Refills: 0 | Status: SHIPPED | OUTPATIENT
Start: 2020-08-10 | End: 2020-08-30

## 2020-08-10 NOTE — PATIENT INSTRUCTIONS
Steps to help prevent the spread of COVID-19 if you are sick  SOURCE - https://hunt-vargas.info/. html     Stay home except to get medical care   ; Stay home: People who are mildly ill with COVID-19 are able to isolate at home during their illness. You should restrict activities outside your home, except for getting medical care.   ; Avoid public areas: Do not go to work, school, or public areas.   ; Avoid public transportation: Avoid using public transportation, ride-sharing, or taxis.  ; Separate yourself from other people and animals in your home   ; Stay away from others: As much as possible, you should stay in a specific room and away from other people in your home. Also, you should use a separate bathroom, if available.   ; Limit contact with pets & animals: You should restrict contact with pets and other animals while you are sick with COVID-19, just like you would around other people. Although there have not been reports of pets or other animals becoming sick with COVID-19, it is still recommended that people sick with COVID-19 limit contact with animals until more information is known about the virus. ; When possible, have another member of your household care for your animals while you are sick. If you are sick with COVID-19, avoid contact with your pet, including petting, snuggling, being kissed or licked, and sharing food. If you must care for your pet or be around animals while you are sick, wash your hands before and after you interact with pets and wear a facemask. See COVID-19 and Animals for more information. Other considerations   The ill person should eat/be fed in their room if possible. Non-disposable  items used should be handled with gloves and washed with hot water or in a . Clean hands after handling used  items.  If possible, dedicate a lined trash can for the ill person.  Use gloves when removing garbage bags, handling, and disposing of trash. Wash hands after handling or disposing of trash.  Consider consulting with your local health department about trash disposal guidance if available. Information for Household Members and Caregivers of Someone who is Sick   Call ahead before visiting your doctor   Call ahead: If you have a medical appointment, call the healthcare provider and tell them that you have or may have COVID-19. This will help the healthcare provider's office take steps to keep other people from getting infected or exposed. Wear a facemask if you are sick   ; If you are sick: You should wear a facemask when you are around other people (e.g., sharing a room or vehicle) or pets and before you enter a healthcare provider's office. ; If you are caring for others: If the person who is sick is not able to wear a facemask (for example, because it causes trouble breathing), then people who live with the person who is sick should not stay in the same room with them, or they should wear a facemask if they enter a room with the person who is sick. Cover your coughs and sneezes   ; Cover: Cover your mouth and nose with a tissue when you cough or sneeze.   ; Dispose: Throw used tissues in a lined trash can.   ; Wash hands: Immediately wash your hands with soap and water for at least 20 seconds or, if soap and water are not available, clean your hands with an alcohol-based hand  that contains at least 60% alcohol. Clean your hands often   ;  Wash hands: Wash your hands often with soap and water for at least 20 seconds, especially after blowing your nose, coughing, or sneezing; going to the bathroom; and before eating or preparing food.   ; Hand : If soap and water are not readily available, use an alcohol-based hand  with at least 60% alcohol, covering all surfaces of your hands and rubbing them together until they feel dry.   ; Soap and water: Soap and water are the best option if facility. These steps will help the healthcare provider's office to keep other people in the office or waiting room from getting infected or exposed. ; Alert health department: Ask your healthcare provider to call the local or state health department. Persons who are placed under active monitoring or facilitated self-monitoring should follow instructions provided by their local health department or occupational health professionals, as appropriate.  ; Call 911 if you have a medical emergency: If you have a medical emergency and need to call 911, notify the dispatch personnel that you have, or are being evaluated for COVID-19. If possible, put on a facemask before emergency medical services arrive. Out of work for 11 days or until symptoms resolve.

## 2020-08-11 ENCOUNTER — OFFICE VISIT (OUTPATIENT)
Dept: PRIMARY CARE CLINIC | Age: 30
End: 2020-08-11
Payer: COMMERCIAL

## 2020-08-11 PROCEDURE — G8428 CUR MEDS NOT DOCUMENT: HCPCS | Performed by: NURSE PRACTITIONER

## 2020-08-11 PROCEDURE — 99211 OFF/OP EST MAY X REQ PHY/QHP: CPT | Performed by: NURSE PRACTITIONER

## 2020-08-11 PROCEDURE — G8417 CALC BMI ABV UP PARAM F/U: HCPCS | Performed by: NURSE PRACTITIONER

## 2020-08-11 NOTE — PATIENT INSTRUCTIONS
Advance Care Planning  People with COVID-19 may have no symptoms, mild symptoms, such as fever, cough, and shortness of breath or they may have more severe illness, developing severe and fatal pneumonia. As a result, Advance Care Planning with attention to naming a health care decision maker (someone you trust to make healthcare decisions for you if you could not speak for yourself) and sharing other health care preferences is important BEFORE a possible health crisis. Please contact your Primary Care Provider to discuss Advance Care Planning. Preventing the Spread of Coronavirus Disease 2019 in Homes and Residential Communities  For the most recent information go to [email or web address]    Prevention steps for People with confirmed or suspected COVID-19 (including persons under investigation) who do not need to be hospitalized  and   People with confirmed COVID-19 who were hospitalized and determined to be medically stable to go home    Your healthcare provider and public health staff will evaluate whether you can be cared for at home. If it is determined that you do not need to be hospitalized and can be isolated at home, you will be monitored by staff from your local or state health department. You should follow the prevention steps below until a healthcare provider or local or state health department says you can return to your normal activities. Stay home except to get medical care  People who are mildly ill with COVID-19 are able to isolate at home during their illness. You should restrict activities outside your home, except for getting medical care. Do not go to work, school, or public areas. Avoid using public transportation, ride-sharing, or taxis. Separate yourself from other people and animals in your home  People: As much as possible, you should stay in a specific room and away from other people in your home.  Also, you should use a separate bathroom, if available. Animals: You should restrict contact with pets and other animals while you are sick with COVID-19, just like you would around other people. Although there have not been reports of pets or other animals becoming sick with COVID-19, it is still recommended that people sick with COVID-19 limit contact with animals until more information is known about the virus. When possible, have another member of your household care for your animals while you are sick. If you are sick with COVID-19, avoid contact with your pet, including petting, snuggling, being kissed or licked, and sharing food. If you must care for your pet or be around animals while you are sick, wash your hands before and after you interact with pets and wear a facemask. Call ahead before visiting your doctor  If you have a medical appointment, call the healthcare provider and tell them that you have or may have COVID-19. This will help the healthcare providers office take steps to keep other people from getting infected or exposed. Wear a facemask  You should wear a facemask when you are around other people (e.g., sharing a room or vehicle) or pets and before you enter a healthcare providers office. If you are not able to wear a facemask (for example, because it causes trouble breathing), then people who live with you should not stay in the same room with you, or they should wear a facemask if they enter your room. Cover your coughs and sneezes  Cover your mouth and nose with a tissue when you cough or sneeze. Throw used tissues in a lined trash can. Immediately wash your hands with soap and water for at least 20 seconds or, if soap and water are not available, clean your hands with an alcohol-based hand  that contains at least 60% alcohol.   Clean your hands often  Wash your hands often with soap and water for at least 20 seconds, especially after blowing your nose, coughing, or sneezing; going to the bathroom; and have a medical emergency and need to call 911, notify the dispatch personnel that you have, or are being evaluated for COVID-19. If possible, put on a facemask before emergency medical services arrive. Discontinuing home isolation  Patients with confirmed COVID-19 should remain under home isolation precautions until the risk of secondary transmission to others is thought to be low. The decision to discontinue home isolation precautions should be made on a case-by-case basis, in consultation with healthcare providers and state and local health departments.

## 2020-08-11 NOTE — PROGRESS NOTES
Jericho Satindervalorie received a viral test for COVID-19. They were educated on isolation and quarantine as appropriate. For any symptoms, they were directed to seek care from their PCP, given contact information to establish with a doctor, directed to an urgent care or the emergency room.

## 2020-08-13 LAB
SARS-COV-2: NOT DETECTED
SOURCE: NORMAL

## 2020-08-14 RX ORDER — AMOXICILLIN AND CLAVULANATE POTASSIUM 875; 125 MG/1; MG/1
1 TABLET, FILM COATED ORAL 2 TIMES DAILY
Qty: 20 TABLET | Refills: 0 | Status: SHIPPED | OUTPATIENT
Start: 2020-08-14 | End: 2020-08-24

## 2020-08-18 RX ORDER — FLUCONAZOLE 150 MG/1
150 TABLET ORAL ONCE
Qty: 1 TABLET | Refills: 0 | Status: SHIPPED | OUTPATIENT
Start: 2020-08-18 | End: 2020-08-18

## 2020-08-26 ENCOUNTER — TELEPHONE (OUTPATIENT)
Dept: BARIATRICS/WEIGHT MGMT | Age: 30
End: 2020-08-26

## 2020-08-26 ENCOUNTER — OFFICE VISIT (OUTPATIENT)
Dept: BARIATRICS/WEIGHT MGMT | Age: 30
End: 2020-08-26
Payer: COMMERCIAL

## 2020-08-26 VITALS
DIASTOLIC BLOOD PRESSURE: 88 MMHG | BODY MASS INDEX: 43.6 KG/M2 | SYSTOLIC BLOOD PRESSURE: 141 MMHG | TEMPERATURE: 97.3 F | WEIGHT: 277.8 LBS | HEART RATE: 72 BPM | HEIGHT: 67 IN

## 2020-08-26 PROBLEM — E66.01 MORBID OBESITY WITH BMI OF 40.0-44.9, ADULT (HCC): Status: ACTIVE | Noted: 2020-08-26

## 2020-08-26 PROCEDURE — 99214 OFFICE O/P EST MOD 30 MIN: CPT | Performed by: SURGERY

## 2020-08-26 PROCEDURE — G8428 CUR MEDS NOT DOCUMENT: HCPCS | Performed by: SURGERY

## 2020-08-26 PROCEDURE — 1036F TOBACCO NON-USER: CPT | Performed by: SURGERY

## 2020-08-26 PROCEDURE — G8417 CALC BMI ABV UP PARAM F/U: HCPCS | Performed by: SURGERY

## 2020-08-26 ASSESSMENT — ENCOUNTER SYMPTOMS
SHORTNESS OF BREATH: 0
EYES NEGATIVE: 1
ALLERGIC/IMMUNOLOGIC NEGATIVE: 1
RESPIRATORY NEGATIVE: 1
COUGH: 0
GASTROINTESTINAL NEGATIVE: 1

## 2020-08-26 NOTE — PROGRESS NOTES
Sue Barry gained 6 lbs over the past month. Pt is frustrated with weight gain; she has been sick with bad cold. Is pt eating at least 4 times everyday? yes she is    Is pt eating a lean protein source with all meals and snacks? yes she is; protein bars    Has pt decreased their portions using the plate method? yes she is mindful ofportions    Is pt choosing low fat/sugar free options? yes she is    Is pt drinking at least 64 oz of clear liquids everyday? yes water only    Has pt stopped drinking carbonation, caffeinated, and sugar sweetened beverages?  yes she has eliminated    Has pt sampled Unjury and/or Nectar protein? no but reviewed product info today    Participating in intentional exercise? active at work but none organized    Plan/Recommendations: continue meal plan as presented    Handouts: none    Saad Szymanski
negative  ENT ROS: negative  Allergy and Immunology ROS: negative  Hematological and Lymphatic ROS: negative  Endocrine ROS: negative  Respiratory ROS: negative  Cardiovascular ROS: negative  Gastrointestinal ROS:negative  Genito-Urinary ROS: negative  Musculoskeletal ROS: negative   Skin ROS: negative    Physical Exam   Vitals Reviewed   Constitutional: Patient is oriented to person, place, and time. Patient appears well-developed and well-nourished. Patient is active and cooperative. Non-toxic appearance. No distress. Neck: Trachea normal and normal range of motion. No JVD present. Pulmonary/Chest: Effort normal. No accessory muscle usage or stridor. No apnea. No respiratory distress. Cardiovascular: Normal rate and no JVD. Abdominal: Normal appearance. Patient exhibits no distension. Abdomen is soft, obese, non tender. Musculoskeletal: Normal range of motion. Patient exhibits no edema. Neurological: Patient is alert and oriented to person, place, and time. Patient has normal strength. GCS eye subscore is 4. GCS verbal subscore is 5. GCS motor subscore is 6. Skin: Skin is warm and dry. No abrasion and no rash noted. Patient is not diaphoretic. No cyanosis or erythema. Psychiatric: Patient has a normal mood and affect. Speech is normal and behavior is normal. Cognition and memory are normal.       A/P    Annabel Drum is 35 y.o. female, Body mass index is 43.51 kg/m². pre surgery, has gained 5 # since last visit, total of 30# weight loss though for process. The patient underwent dietary counseling with registered dietician. I have reviewed, discussed and agree with the dietary plan. Patient is trying hard to keep good dietary and behavior modifications. Patient is monitoring portion sizes, food choices and liquid calories. Patient is trying to exercise regularly as much as possible.   We discussed how her weight affects her overall health including:  Floresita Espinoza was seen today for weight

## 2020-08-26 NOTE — PROGRESS NOTES
[facility]) Physicians   [facility]    Subjective:      Patient ID: Cari Cooper is a 35 y.o. female    HPI    The patient is here for their bariatric surgery preoperative education group visit. She has made several attempts at weight loss in the past without success and now has been scheduled to have bariatric surgery on September 15, 2020 for future weight loss. She is working to change her dietary behaviors and lose weight to prevent comorbid conditions. Cari Cooper is a very pleasant 35 y.o. female with Body mass index is 43.23 kg/m². No past medical history on file. Past Surgical History:   Procedure Laterality Date    CHOLECYSTECTOMY      UPPER GASTROINTESTINAL ENDOSCOPY N/A 6/8/2020    EGD BIOPSY performed by Sandro Kirkpatrick DO at [facility] ENDOSCOPY     Family History   Problem Relation Age of Onset    Other Mother         hyperactive thyroid    Other Maternal Aunt         hyperactive thyroid    Heart Disease Maternal Grandmother     Hypertension Maternal Grandmother     Elevated Lipids Maternal Grandmother     Heart Disease Maternal Grandfather     Hypertension Maternal Grandfather     Elevated Lipids Maternal Grandfather     Breast Cancer Paternal Grandmother      Social History     Tobacco Use    Smoking status: Never Smoker    Smokeless tobacco: Never Used   Substance Use Topics    Alcohol use: Yes     Comment: 2 x/ yr     I counseled the patient on the importance of not smoking and risks of ETOH. No Known Allergies  Vitals:    09/01/20 1432   Weight: 276 lb (125.2 kg)   Height: 5' 7\" (1.702 m)     Body mass index is 43.23 kg/m².     Current Outpatient Medications:     levonorgestrel (MIRENA) IUD 52 mg, 1 each by Intrauterine route once, Disp: , Rfl:     Lab Results   Component Value Date    WBC 8.4 07/21/2020    RBC 4.66 07/21/2020    HGB 13.6 07/21/2020    HCT 40.4 07/21/2020    MCV 86.7 07/21/2020    MCH 29.2 07/21/2020    MCHC 33.7 07/21/2020    MPV 8.7 07/21/2020    NEUTOPHILPCT 52.1 07/21/2020    LYMPHOPCT 35.0 07/21/2020    MONOPCT 9.4 07/21/2020    EOSRELPCT 2.6 07/21/2020    BASOPCT 0.9 07/21/2020    NEUTROABS 4.4 07/21/2020    LYMPHSABS 2.9 07/21/2020    MONOSABS 0.8 07/21/2020    EOSABS 0.2 07/21/2020     Lab Results   Component Value Date     07/21/2020    K 4.3 07/21/2020     07/21/2020    CO2 22 07/21/2020    ANIONGAP 13 07/21/2020    GLUCOSE 107 07/21/2020    BUN 14 07/21/2020    CREATININE 0.6 07/21/2020    LABGLOM >60 07/21/2020    GFRAA >60 07/21/2020    CALCIUM 9.1 07/21/2020    PROT 6.8 07/21/2020    LABALBU 4.4 07/21/2020    AGRATIO 1.8 07/21/2020    BILITOT 1.5 07/21/2020    ALKPHOS 52 07/21/2020    ALT 15 07/21/2020    AST 14 07/21/2020    GLOB 2.4 07/21/2020     Lab Results   Component Value Date    CHOL 119 07/21/2020    TRIG 55 07/21/2020    HDL 40 07/21/2020    LDLCALC 68 07/21/2020    LABVLDL 11 07/21/2020     Lab Results   Component Value Date    TSHREFLEX 2.62 07/21/2020     Lab Results   Component Value Date    IRON 71 07/21/2020    TIBC 314 07/21/2020    LABIRON 23 07/21/2020     Lab Results   Component Value Date    HXVQDSVU47 330 07/21/2020    FOLATE 11.04 07/21/2020     Lab Results   Component Value Date    VITD25 34.1 07/21/2020     Lab Results   Component Value Date    LABA1C 5.6 07/21/2020    .0 07/21/2020     Review of Systems   Constitutional: Negative. Negative for chills, fatigue and fever. HENT: Negative. Eyes: Negative. Respiratory: Negative. Negative for cough and shortness of breath. Cardiovascular: Negative. Gastrointestinal: Negative. Endocrine: Negative. Genitourinary: Negative. Musculoskeletal: Negative. Skin: Negative. Allergic/Immunologic: Negative. Neurological: Negative. Hematological: Negative. Psychiatric/Behavioral: Negative. Objective:     Physical Exam  Vitals signs reviewed. Constitutional:       Appearance: She is well-developed.    HENT: Head: Normocephalic and atraumatic. Eyes:      Conjunctiva/sclera: Conjunctivae normal.      Pupils: Pupils are equal, round, and reactive to light. Neck:      Musculoskeletal: Normal range of motion and neck supple. Pulmonary:      Effort: Pulmonary effort is normal.   Abdominal:      Palpations: Abdomen is soft. Musculoskeletal: Normal range of motion. Skin:     General: Skin is warm and dry. Neurological:      Mental Status: She is alert and oriented to person, place, and time. Psychiatric:         Behavior: Behavior normal.         Thought Content: Thought content normal.         Judgment: Judgment normal.       Assessment and Plan:   Patient is here for their preoperative education group visit for sleeve gastrectomy. The patient is down 1.8 lbs today. The patient's current Body mass index is 43.23 kg/m². (9/1/20). She is making good dietary and behavior modifications and is considered to be a good surgical candidate. Patient received instruction that it is recommended to avoid pregnancy following bariatric surgery for at least 2 years to allow them to have stable weight loss and to help avoid increased risk of vitamin deficiencies and malnutrition. The patient was encouraged to discuss possible contraceptive methods with their PCP or OBGYN. Patient received instructions from the registered dietitian in reference to the two week preoperative diet and the four phases of their postoperative diet. In addition I reviewed these instructions and stressed the importance of following these recommendations for their safety.      Patient completed the preoperative class where they were provided with education related to their bariatric surgery, common surgical complications, medications preoperatively & postoperatively, special concerns related to bariatric surgery postoperatively, vitamin supplementation, patient agreement, PAT & scheduling, hospital course, wellness discovery program and what to do

## 2020-08-26 NOTE — TELEPHONE ENCOUNTER
[place] prior auth sleeve [id number], hiatal hernia repair [id number] submitted, with clinicals via the online portal    Pending auth#[id number]    DOS 9/15/20

## 2020-08-27 ENCOUNTER — TELEPHONE (OUTPATIENT)
Dept: FAMILY MEDICINE CLINIC | Age: 30
End: 2020-08-27

## 2020-09-01 ENCOUNTER — OFFICE VISIT (OUTPATIENT)
Dept: BARIATRICS/WEIGHT MGMT | Age: 30
End: 2020-09-01
Payer: COMMERCIAL

## 2020-09-01 VITALS — WEIGHT: 276 LBS | BODY MASS INDEX: 43.32 KG/M2 | HEIGHT: 67 IN

## 2020-09-01 PROCEDURE — 1036F TOBACCO NON-USER: CPT | Performed by: NURSE PRACTITIONER

## 2020-09-01 PROCEDURE — 99214 OFFICE O/P EST MOD 30 MIN: CPT | Performed by: NURSE PRACTITIONER

## 2020-09-01 PROCEDURE — G8417 CALC BMI ABV UP PARAM F/U: HCPCS | Performed by: NURSE PRACTITIONER

## 2020-09-01 PROCEDURE — G8427 DOCREV CUR MEDS BY ELIG CLIN: HCPCS | Performed by: NURSE PRACTITIONER

## 2020-09-08 ENCOUNTER — OFFICE VISIT (OUTPATIENT)
Dept: FAMILY MEDICINE CLINIC | Age: 30
End: 2020-09-08
Payer: COMMERCIAL

## 2020-09-08 VITALS
HEART RATE: 82 BPM | OXYGEN SATURATION: 98 % | WEIGHT: 273 LBS | TEMPERATURE: 97.3 F | BODY MASS INDEX: 42.85 KG/M2 | DIASTOLIC BLOOD PRESSURE: 70 MMHG | SYSTOLIC BLOOD PRESSURE: 100 MMHG | HEIGHT: 67 IN

## 2020-09-08 DIAGNOSIS — Z01.818 PRE-OP EXAM: ICD-10-CM

## 2020-09-08 LAB
A/G RATIO: 2 (ref 1.1–2.2)
ALBUMIN SERPL-MCNC: 4.6 G/DL (ref 3.4–5)
ALP BLD-CCNC: 62 U/L (ref 40–129)
ALT SERPL-CCNC: 38 U/L (ref 10–40)
ANION GAP SERPL CALCULATED.3IONS-SCNC: 12 MMOL/L (ref 3–16)
AST SERPL-CCNC: 22 U/L (ref 15–37)
BASOPHILS ABSOLUTE: 0.1 K/UL (ref 0–0.2)
BASOPHILS RELATIVE PERCENT: 0.7 %
BILIRUB SERPL-MCNC: 2.3 MG/DL (ref 0–1)
BUN BLDV-MCNC: 13 MG/DL (ref 7–20)
CALCIUM SERPL-MCNC: 9.5 MG/DL (ref 8.3–10.6)
CHLORIDE BLD-SCNC: 104 MMOL/L (ref 99–110)
CO2: 22 MMOL/L (ref 21–32)
CREAT SERPL-MCNC: <0.5 MG/DL (ref 0.6–1.1)
EOSINOPHILS ABSOLUTE: 0.1 K/UL (ref 0–0.6)
EOSINOPHILS RELATIVE PERCENT: 1.7 %
GFR AFRICAN AMERICAN: >60
GFR NON-AFRICAN AMERICAN: >60
GLOBULIN: 2.3 G/DL
GLUCOSE BLD-MCNC: 85 MG/DL (ref 70–99)
HCT VFR BLD CALC: 38.9 % (ref 36–48)
HEMOGLOBIN: 13.2 G/DL (ref 12–16)
LYMPHOCYTES ABSOLUTE: 2.9 K/UL (ref 1–5.1)
LYMPHOCYTES RELATIVE PERCENT: 35.8 %
MCH RBC QN AUTO: 28.7 PG (ref 26–34)
MCHC RBC AUTO-ENTMCNC: 33.8 G/DL (ref 31–36)
MCV RBC AUTO: 84.8 FL (ref 80–100)
MONOCYTES ABSOLUTE: 0.7 K/UL (ref 0–1.3)
MONOCYTES RELATIVE PERCENT: 8.6 %
NEUTROPHILS ABSOLUTE: 4.3 K/UL (ref 1.7–7.7)
NEUTROPHILS RELATIVE PERCENT: 53.2 %
PDW BLD-RTO: 13.3 % (ref 12.4–15.4)
PLATELET # BLD: 266 K/UL (ref 135–450)
PMV BLD AUTO: 8.5 FL (ref 5–10.5)
POTASSIUM SERPL-SCNC: 4.2 MMOL/L (ref 3.5–5.1)
RBC # BLD: 4.59 M/UL (ref 4–5.2)
SODIUM BLD-SCNC: 138 MMOL/L (ref 136–145)
TOTAL PROTEIN: 6.9 G/DL (ref 6.4–8.2)
WBC # BLD: 8 K/UL (ref 4–11)

## 2020-09-08 PROCEDURE — G8417 CALC BMI ABV UP PARAM F/U: HCPCS | Performed by: PHYSICIAN ASSISTANT

## 2020-09-08 PROCEDURE — 1036F TOBACCO NON-USER: CPT | Performed by: PHYSICIAN ASSISTANT

## 2020-09-08 PROCEDURE — 93000 ELECTROCARDIOGRAM COMPLETE: CPT | Performed by: PHYSICIAN ASSISTANT

## 2020-09-08 PROCEDURE — 99214 OFFICE O/P EST MOD 30 MIN: CPT | Performed by: PHYSICIAN ASSISTANT

## 2020-09-08 PROCEDURE — G8427 DOCREV CUR MEDS BY ELIG CLIN: HCPCS | Performed by: PHYSICIAN ASSISTANT

## 2020-09-08 NOTE — PROGRESS NOTES
BIOPSY performed by Ashley Pichardo DO at [facility] ENDOSCOPY     Family History   Problem Relation Age of Onset    Other Mother         hyperactive thyroid    Other Maternal Aunt         hyperactive thyroid    Heart Disease Maternal Grandmother     Hypertension Maternal Grandmother     Elevated Lipids Maternal Grandmother     Heart Disease Maternal Grandfather     Hypertension Maternal Grandfather     Elevated Lipids Maternal Grandfather     Breast Cancer Paternal Grandmother      Social History     Socioeconomic History    Marital status: Single     Spouse name: Not on file    Number of children: Not on file    Years of education: Not on file    Highest education level: Not on file   Occupational History    Not on file   Social Needs    Financial resource strain: Not on file    Food insecurity     Worry: Not on file     Inability: Not on file    Transportation needs     Medical: Not on file     Non-medical: Not on file   Tobacco Use    Smoking status: Never Smoker    Smokeless tobacco: Never Used   Substance and Sexual Activity    Alcohol use: Yes     Comment: 2 x/ yr    Drug use: No    Sexual activity: Yes     Partners: Male   Lifestyle    Physical activity     Days per week: Not on file     Minutes per session: Not on file    Stress: Not on file   Relationships    Social connections     Talks on phone: Not on file     Gets together: Not on file     Attends Hinduism service: Not on file     Active member of club or organization: Not on file     Attends meetings of clubs or organizations: Not on file     Relationship status: Not on file    Intimate partner violence     Fear of current or ex partner: Not on file     Emotionally abused: Not on file     Physically abused: Not on file     Forced sexual activity: Not on file   Other Topics Concern    Not on file   Social History Narrative    Not on file       Review of Systems  A comprehensive review of systems was negative.       Physical Exam studies  2. Change in medication regimen before surgery: N/A- not on any medications  3. Prophylaxis for cardiac events with perioperative beta-blockers: Not indicated  ACC/AHA indications for pre-operative beta-blocker use:    · Vascular surgery with history of postitive stress test  · Intermediate or high risk surgery with history of CAD   · Intermediate or high risk surgery with multiple clinical predictors of CAD- 2 of the following: history of compensated or prior heart failure, history of cerebrovascular disease, DM, or renal insufficiency    Routine administration of higher-dose, long-acting metoprolol in beta-blocker-naïve patients on the day of surgery, and in the absence of dose titration is associated with an overall increase in mortality. Beta-blockers should be started days to weeks prior to surgery and titrated to pulse < 70.  4. Deep vein thrombosis prophylaxis: regimen to be chosen by surgical team  5. No contraindications to planned surgery.

## 2020-09-08 NOTE — TELEPHONE ENCOUNTER
[facility] approval sleeve, [id number], and hiatal hernia repair [id number]  DOS 9/15/20  Auth # [id number]

## 2020-09-09 ENCOUNTER — NURSE ONLY (OUTPATIENT)
Dept: PRIMARY CARE CLINIC | Age: 30
End: 2020-09-09
Payer: COMMERCIAL

## 2020-09-09 PROCEDURE — 99211 OFF/OP EST MAY X REQ PHY/QHP: CPT | Performed by: NURSE PRACTITIONER

## 2020-09-11 LAB — SARS-COV-2, NAA: NOT DETECTED

## 2020-09-14 ENCOUNTER — ANESTHESIA EVENT (OUTPATIENT)
Dept: OPERATING ROOM | Age: 30
DRG: 403 | End: 2020-09-14
Payer: COMMERCIAL

## 2020-09-14 ENCOUNTER — TELEPHONE (OUTPATIENT)
Dept: BARIATRICS/WEIGHT MGMT | Age: 30
End: 2020-09-14

## 2020-09-14 NOTE — RESULT ENCOUNTER NOTE
Your test for COVID-19, also known as novel coronavirus, came back negative/ not detected. No virus was detected from the sample collected. Testing is not 100%. Until your symptoms are fully resolved, you may still be contagious. We recommend that you remain isolated for 7 days minimum or 24-48 hours after your symptoms have completely resolved, whichever is longer. If you were exposed to a known positive COVID-19 patient, then you must remain quaratined for 14 days. If you were tested for an upcoming procedue, then you should remain in quaratine until your procedure. Continually monitor symptoms. Contact a medical provider if symptoms are worsening. If you have any additional questions, contact your PCP.     For additional information, please visit the Centers for Disease Control and Prevention [employer] Units: mg

## 2020-09-14 NOTE — PROGRESS NOTES

## 2020-09-14 NOTE — TELEPHONE ENCOUNTER
Spoke with pt to confirm her 7:30 am arrival for her 9:30 am surgery on 9/15/20. Pt states she has completed her pre op diet, and is doing her clear liquid diet today. Pt knows to be NPO after midnight.

## 2020-09-15 ENCOUNTER — HOSPITAL ENCOUNTER (INPATIENT)
Age: 30
LOS: 1 days | Discharge: HOME | DRG: 403 | End: 2020-09-16
Attending: SURGERY | Admitting: SURGERY
Payer: COMMERCIAL

## 2020-09-15 ENCOUNTER — ANESTHESIA (OUTPATIENT)
Dept: OPERATING ROOM | Age: 30
DRG: 403 | End: 2020-09-15
Payer: COMMERCIAL

## 2020-09-15 VITALS — TEMPERATURE: 93.2 F | DIASTOLIC BLOOD PRESSURE: 86 MMHG | SYSTOLIC BLOOD PRESSURE: 124 MMHG | OXYGEN SATURATION: 100 %

## 2020-09-15 PROBLEM — K44.9 HIATAL HERNIA WITH GERD: Status: ACTIVE | Noted: 2020-09-15

## 2020-09-15 PROBLEM — K21.9 HIATAL HERNIA WITH GERD: Status: ACTIVE | Noted: 2020-09-15

## 2020-09-15 LAB
ABO/RH: NORMAL
ANTIBODY SCREEN: NORMAL
GLUCOSE BLD-MCNC: 88 MG/DL (ref 70–99)
PERFORMED ON: NORMAL
PREGNANCY, URINE: NEGATIVE

## 2020-09-15 PROCEDURE — 2500000003 HC RX 250 WO HCPCS: Performed by: ANESTHESIOLOGY

## 2020-09-15 PROCEDURE — 6360000002 HC RX W HCPCS: Performed by: SURGERY

## 2020-09-15 PROCEDURE — 6360000002 HC RX W HCPCS: Performed by: NURSE ANESTHETIST, CERTIFIED REGISTERED

## 2020-09-15 PROCEDURE — 2580000003 HC RX 258: Performed by: ANESTHESIOLOGY

## 2020-09-15 PROCEDURE — 88302 TISSUE EXAM BY PATHOLOGIST: CPT

## 2020-09-15 PROCEDURE — 1200000000 HC SEMI PRIVATE

## 2020-09-15 PROCEDURE — 2500000003 HC RX 250 WO HCPCS: Performed by: SURGERY

## 2020-09-15 PROCEDURE — 2720000010 HC SURG SUPPLY STERILE: Performed by: SURGERY

## 2020-09-15 PROCEDURE — 8E0W4CZ ROBOTIC ASSISTED PROCEDURE OF TRUNK REGION, PERCUTANEOUS ENDOSCOPIC APPROACH: ICD-10-PCS | Performed by: SURGERY

## 2020-09-15 PROCEDURE — 94761 N-INVAS EAR/PLS OXIMETRY MLT: CPT

## 2020-09-15 PROCEDURE — 7100000001 HC PACU RECOVERY - ADDTL 15 MIN: Performed by: SURGERY

## 2020-09-15 PROCEDURE — 86900 BLOOD TYPING SEROLOGIC ABO: CPT

## 2020-09-15 PROCEDURE — 6360000002 HC RX W HCPCS: Performed by: ANESTHESIOLOGY

## 2020-09-15 PROCEDURE — 3600000009 HC SURGERY ROBOT BASE: Performed by: SURGERY

## 2020-09-15 PROCEDURE — 3700000001 HC ADD 15 MINUTES (ANESTHESIA): Performed by: SURGERY

## 2020-09-15 PROCEDURE — 2580000003 HC RX 258: Performed by: SURGERY

## 2020-09-15 PROCEDURE — 94770 HC ETCO2 MONITOR DAILY: CPT

## 2020-09-15 PROCEDURE — 88307 TISSUE EXAM BY PATHOLOGIST: CPT

## 2020-09-15 PROCEDURE — S2900 ROBOTIC SURGICAL SYSTEM: HCPCS | Performed by: SURGERY

## 2020-09-15 PROCEDURE — 86850 RBC ANTIBODY SCREEN: CPT

## 2020-09-15 PROCEDURE — C9113 INJ PANTOPRAZOLE SODIUM, VIA: HCPCS | Performed by: SURGERY

## 2020-09-15 PROCEDURE — 43281 LAP PARAESOPHAG HERN REPAIR: CPT | Performed by: SURGERY

## 2020-09-15 PROCEDURE — 7100000000 HC PACU RECOVERY - FIRST 15 MIN: Performed by: SURGERY

## 2020-09-15 PROCEDURE — 6370000000 HC RX 637 (ALT 250 FOR IP): Performed by: SURGERY

## 2020-09-15 PROCEDURE — 43775 LAP SLEEVE GASTRECTOMY: CPT | Performed by: SURGERY

## 2020-09-15 PROCEDURE — 3700000000 HC ANESTHESIA ATTENDED CARE: Performed by: SURGERY

## 2020-09-15 PROCEDURE — 0DB64Z3 EXCISION OF STOMACH, PERCUTANEOUS ENDOSCOPIC APPROACH, VERTICAL: ICD-10-PCS | Performed by: SURGERY

## 2020-09-15 PROCEDURE — 6360000002 HC RX W HCPCS: Performed by: NURSE PRACTITIONER

## 2020-09-15 PROCEDURE — C1713 ANCHOR/SCREW BN/BN,TIS/BN: HCPCS | Performed by: SURGERY

## 2020-09-15 PROCEDURE — 84703 CHORIONIC GONADOTROPIN ASSAY: CPT

## 2020-09-15 PROCEDURE — 6370000000 HC RX 637 (ALT 250 FOR IP): Performed by: NURSE PRACTITIONER

## 2020-09-15 PROCEDURE — 2580000003 HC RX 258: Performed by: NURSE PRACTITIONER

## 2020-09-15 PROCEDURE — 0BQT4ZZ REPAIR DIAPHRAGM, PERCUTANEOUS ENDOSCOPIC APPROACH: ICD-10-PCS | Performed by: SURGERY

## 2020-09-15 PROCEDURE — 2709999900 HC NON-CHARGEABLE SUPPLY: Performed by: SURGERY

## 2020-09-15 PROCEDURE — 86901 BLOOD TYPING SEROLOGIC RH(D): CPT

## 2020-09-15 PROCEDURE — 3600000019 HC SURGERY ROBOT ADDTL 15MIN: Performed by: SURGERY

## 2020-09-15 PROCEDURE — 2500000003 HC RX 250 WO HCPCS: Performed by: NURSE ANESTHETIST, CERTIFIED REGISTERED

## 2020-09-15 RX ORDER — APREPITANT 40 MG/1
80 CAPSULE ORAL ONCE
Status: COMPLETED | OUTPATIENT
Start: 2020-09-15 | End: 2020-09-15

## 2020-09-15 RX ORDER — SCOLOPAMINE TRANSDERMAL SYSTEM 1 MG/1
1 PATCH, EXTENDED RELEASE TRANSDERMAL ONCE
Status: DISCONTINUED | OUTPATIENT
Start: 2020-09-15 | End: 2020-09-16 | Stop reason: HOSPADM

## 2020-09-15 RX ORDER — SODIUM CHLORIDE 0.9 % (FLUSH) 0.9 %
10 SYRINGE (ML) INJECTION EVERY 12 HOURS SCHEDULED
Status: DISCONTINUED | OUTPATIENT
Start: 2020-09-15 | End: 2020-09-15 | Stop reason: HOSPADM

## 2020-09-15 RX ORDER — LIDOCAINE 4 G/G
1 PATCH TOPICAL DAILY
Status: DISCONTINUED | OUTPATIENT
Start: 2020-09-15 | End: 2020-09-16 | Stop reason: HOSPADM

## 2020-09-15 RX ORDER — SODIUM CHLORIDE, SODIUM LACTATE, POTASSIUM CHLORIDE, CALCIUM CHLORIDE 600; 310; 30; 20 MG/100ML; MG/100ML; MG/100ML; MG/100ML
INJECTION, SOLUTION INTRAVENOUS CONTINUOUS
Status: DISCONTINUED | OUTPATIENT
Start: 2020-09-15 | End: 2020-09-15

## 2020-09-15 RX ORDER — ONDANSETRON 2 MG/ML
4 INJECTION INTRAMUSCULAR; INTRAVENOUS
Status: DISCONTINUED | OUTPATIENT
Start: 2020-09-15 | End: 2020-09-15 | Stop reason: HOSPADM

## 2020-09-15 RX ORDER — METOCLOPRAMIDE HYDROCHLORIDE 5 MG/ML
10 INJECTION INTRAMUSCULAR; INTRAVENOUS EVERY 6 HOURS PRN
Status: DISCONTINUED | OUTPATIENT
Start: 2020-09-15 | End: 2020-09-16 | Stop reason: HOSPADM

## 2020-09-15 RX ORDER — OXYCODONE HYDROCHLORIDE AND ACETAMINOPHEN 5; 325 MG/1; MG/1
1 TABLET ORAL
Status: DISCONTINUED | OUTPATIENT
Start: 2020-09-15 | End: 2020-09-15 | Stop reason: HOSPADM

## 2020-09-15 RX ORDER — METHYLENE BLUE 10 MG/ML
INJECTION INTRAVENOUS PRN
Status: DISCONTINUED | OUTPATIENT
Start: 2020-09-15 | End: 2020-09-15 | Stop reason: ALTCHOICE

## 2020-09-15 RX ORDER — PANTOPRAZOLE SODIUM 40 MG/10ML
40 INJECTION, POWDER, LYOPHILIZED, FOR SOLUTION INTRAVENOUS DAILY
Status: DISCONTINUED | OUTPATIENT
Start: 2020-09-15 | End: 2020-09-16 | Stop reason: HOSPADM

## 2020-09-15 RX ORDER — MIDAZOLAM HYDROCHLORIDE 1 MG/ML
INJECTION INTRAMUSCULAR; INTRAVENOUS PRN
Status: DISCONTINUED | OUTPATIENT
Start: 2020-09-15 | End: 2020-09-15 | Stop reason: SDUPTHER

## 2020-09-15 RX ORDER — FENTANYL CITRATE 50 UG/ML
INJECTION, SOLUTION INTRAMUSCULAR; INTRAVENOUS PRN
Status: DISCONTINUED | OUTPATIENT
Start: 2020-09-15 | End: 2020-09-15 | Stop reason: SDUPTHER

## 2020-09-15 RX ORDER — NALOXONE HYDROCHLORIDE 0.4 MG/ML
0.4 INJECTION, SOLUTION INTRAMUSCULAR; INTRAVENOUS; SUBCUTANEOUS PRN
Status: DISCONTINUED | OUTPATIENT
Start: 2020-09-15 | End: 2020-09-16

## 2020-09-15 RX ORDER — PROPOFOL 10 MG/ML
INJECTION, EMULSION INTRAVENOUS PRN
Status: DISCONTINUED | OUTPATIENT
Start: 2020-09-15 | End: 2020-09-15 | Stop reason: SDUPTHER

## 2020-09-15 RX ORDER — ONDANSETRON 2 MG/ML
4 INJECTION INTRAMUSCULAR; INTRAVENOUS EVERY 6 HOURS PRN
Status: DISCONTINUED | OUTPATIENT
Start: 2020-09-15 | End: 2020-09-16 | Stop reason: HOSPADM

## 2020-09-15 RX ORDER — HYDROMORPHONE HCL 110MG/55ML
PATIENT CONTROLLED ANALGESIA SYRINGE INTRAVENOUS PRN
Status: DISCONTINUED | OUTPATIENT
Start: 2020-09-15 | End: 2020-09-15 | Stop reason: SDUPTHER

## 2020-09-15 RX ORDER — NEOSTIGMINE METHYLSULFATE 5 MG/5 ML
SYRINGE (ML) INTRAVENOUS PRN
Status: DISCONTINUED | OUTPATIENT
Start: 2020-09-15 | End: 2020-09-15 | Stop reason: SDUPTHER

## 2020-09-15 RX ORDER — LIDOCAINE HYDROCHLORIDE 10 MG/ML
1 INJECTION, SOLUTION EPIDURAL; INFILTRATION; INTRACAUDAL; PERINEURAL
Status: DISCONTINUED | OUTPATIENT
Start: 2020-09-15 | End: 2020-09-15 | Stop reason: HOSPADM

## 2020-09-15 RX ORDER — BUPIVACAINE HYDROCHLORIDE 5 MG/ML
INJECTION, SOLUTION EPIDURAL; INTRACAUDAL PRN
Status: DISCONTINUED | OUTPATIENT
Start: 2020-09-15 | End: 2020-09-15 | Stop reason: ALTCHOICE

## 2020-09-15 RX ORDER — HYDRALAZINE HYDROCHLORIDE 20 MG/ML
5 INJECTION INTRAMUSCULAR; INTRAVENOUS EVERY 10 MIN PRN
Status: DISCONTINUED | OUTPATIENT
Start: 2020-09-15 | End: 2020-09-15 | Stop reason: HOSPADM

## 2020-09-15 RX ORDER — ACETAMINOPHEN 160 MG/5ML
650 SOLUTION ORAL ONCE
Status: COMPLETED | OUTPATIENT
Start: 2020-09-15 | End: 2020-09-15

## 2020-09-15 RX ORDER — SODIUM CHLORIDE, SODIUM LACTATE, POTASSIUM CHLORIDE, AND CALCIUM CHLORIDE .6; .31; .03; .02 G/100ML; G/100ML; G/100ML; G/100ML
IRRIGANT IRRIGATION PRN
Status: DISCONTINUED | OUTPATIENT
Start: 2020-09-15 | End: 2020-09-15 | Stop reason: ALTCHOICE

## 2020-09-15 RX ORDER — SODIUM CHLORIDE 9 MG/ML
INJECTION, SOLUTION INTRAVENOUS CONTINUOUS
Status: DISCONTINUED | OUTPATIENT
Start: 2020-09-15 | End: 2020-09-16

## 2020-09-15 RX ORDER — LABETALOL 20 MG/4 ML (5 MG/ML) INTRAVENOUS SYRINGE
5 EVERY 10 MIN PRN
Status: DISCONTINUED | OUTPATIENT
Start: 2020-09-15 | End: 2020-09-15 | Stop reason: HOSPADM

## 2020-09-15 RX ORDER — SODIUM CHLORIDE 0.9 % (FLUSH) 0.9 %
10 SYRINGE (ML) INJECTION PRN
Status: DISCONTINUED | OUTPATIENT
Start: 2020-09-15 | End: 2020-09-15 | Stop reason: HOSPADM

## 2020-09-15 RX ORDER — ROCURONIUM BROMIDE 10 MG/ML
INJECTION, SOLUTION INTRAVENOUS PRN
Status: DISCONTINUED | OUTPATIENT
Start: 2020-09-15 | End: 2020-09-15 | Stop reason: SDUPTHER

## 2020-09-15 RX ORDER — SODIUM CHLORIDE 9 MG/ML
10 INJECTION INTRAVENOUS DAILY
Status: DISCONTINUED | OUTPATIENT
Start: 2020-09-15 | End: 2020-09-16 | Stop reason: HOSPADM

## 2020-09-15 RX ORDER — SODIUM CHLORIDE 0.9 % (FLUSH) 0.9 %
10 SYRINGE (ML) INJECTION PRN
Status: DISCONTINUED | OUTPATIENT
Start: 2020-09-15 | End: 2020-09-16 | Stop reason: HOSPADM

## 2020-09-15 RX ORDER — PROMETHAZINE HYDROCHLORIDE 25 MG/ML
6.25 INJECTION, SOLUTION INTRAMUSCULAR; INTRAVENOUS
Status: DISCONTINUED | OUTPATIENT
Start: 2020-09-15 | End: 2020-09-15 | Stop reason: HOSPADM

## 2020-09-15 RX ORDER — SCOLOPAMINE TRANSDERMAL SYSTEM 1 MG/1
1 PATCH, EXTENDED RELEASE TRANSDERMAL
Status: DISCONTINUED | OUTPATIENT
Start: 2020-09-18 | End: 2020-09-16 | Stop reason: HOSPADM

## 2020-09-15 RX ORDER — SODIUM CHLORIDE 0.9 % (FLUSH) 0.9 %
10 SYRINGE (ML) INJECTION EVERY 12 HOURS SCHEDULED
Status: DISCONTINUED | OUTPATIENT
Start: 2020-09-15 | End: 2020-09-16 | Stop reason: HOSPADM

## 2020-09-15 RX ORDER — FENTANYL CITRATE 50 UG/ML
50 INJECTION, SOLUTION INTRAMUSCULAR; INTRAVENOUS EVERY 5 MIN PRN
Status: DISCONTINUED | OUTPATIENT
Start: 2020-09-15 | End: 2020-09-15 | Stop reason: HOSPADM

## 2020-09-15 RX ORDER — PROCHLORPERAZINE EDISYLATE 5 MG/ML
10 INJECTION INTRAMUSCULAR; INTRAVENOUS EVERY 6 HOURS PRN
Status: DISCONTINUED | OUTPATIENT
Start: 2020-09-15 | End: 2020-09-16 | Stop reason: HOSPADM

## 2020-09-15 RX ORDER — GLYCOPYRROLATE 1 MG/5 ML
SYRINGE (ML) INTRAVENOUS PRN
Status: DISCONTINUED | OUTPATIENT
Start: 2020-09-15 | End: 2020-09-15 | Stop reason: SDUPTHER

## 2020-09-15 RX ORDER — FENTANYL CITRATE 50 UG/ML
25 INJECTION, SOLUTION INTRAMUSCULAR; INTRAVENOUS EVERY 5 MIN PRN
Status: DISCONTINUED | OUTPATIENT
Start: 2020-09-15 | End: 2020-09-15 | Stop reason: HOSPADM

## 2020-09-15 RX ORDER — PREGABALIN 150 MG/1
150 CAPSULE ORAL ONCE
Status: COMPLETED | OUTPATIENT
Start: 2020-09-15 | End: 2020-09-15

## 2020-09-15 RX ADMIN — SODIUM CHLORIDE: 9 INJECTION, SOLUTION INTRAVENOUS at 20:27

## 2020-09-15 RX ADMIN — Medication 10 ML: at 23:43

## 2020-09-15 RX ADMIN — FENTANYL CITRATE 100 MCG: 50 INJECTION INTRAMUSCULAR; INTRAVENOUS at 09:10

## 2020-09-15 RX ADMIN — FENTANYL CITRATE 50 MCG: 50 INJECTION INTRAMUSCULAR; INTRAVENOUS at 12:00

## 2020-09-15 RX ADMIN — MIDAZOLAM HYDROCHLORIDE 2 MG: 2 INJECTION, SOLUTION INTRAMUSCULAR; INTRAVENOUS at 09:10

## 2020-09-15 RX ADMIN — Medication 4 MG: at 11:06

## 2020-09-15 RX ADMIN — METOCLOPRAMIDE 10 MG: 5 INJECTION, SOLUTION INTRAMUSCULAR; INTRAVENOUS at 21:47

## 2020-09-15 RX ADMIN — SODIUM CHLORIDE, POTASSIUM CHLORIDE, SODIUM LACTATE AND CALCIUM CHLORIDE: 600; 310; 30; 20 INJECTION, SOLUTION INTRAVENOUS at 08:55

## 2020-09-15 RX ADMIN — SODIUM CHLORIDE: 9 INJECTION, SOLUTION INTRAVENOUS at 15:54

## 2020-09-15 RX ADMIN — Medication 10 ML: at 21:24

## 2020-09-15 RX ADMIN — SODIUM CHLORIDE: 9 INJECTION, SOLUTION INTRAVENOUS at 11:33

## 2020-09-15 RX ADMIN — ACETAMINOPHEN 650 MG: 160 SOLUTION ORAL at 08:16

## 2020-09-15 RX ADMIN — APREPITANT 80 MG: 40 CAPSULE ORAL at 08:16

## 2020-09-15 RX ADMIN — ROCURONIUM BROMIDE 50 MG: 10 INJECTION INTRAVENOUS at 09:49

## 2020-09-15 RX ADMIN — HYDROMORPHONE HYDROCHLORIDE 0.5 MG: 1 INJECTION, SOLUTION INTRAMUSCULAR; INTRAVENOUS; SUBCUTANEOUS at 13:09

## 2020-09-15 RX ADMIN — METHOCARBAMOL 1000 MG: 100 INJECTION, SOLUTION INTRAMUSCULAR; INTRAVENOUS at 23:48

## 2020-09-15 RX ADMIN — PREGABALIN 150 MG: 150 CAPSULE ORAL at 08:16

## 2020-09-15 RX ADMIN — HYDROMORPHONE HYDROCHLORIDE 0.8 MG: 2 INJECTION, SOLUTION INTRAMUSCULAR; INTRAVENOUS; SUBCUTANEOUS at 09:32

## 2020-09-15 RX ADMIN — ENOXAPARIN SODIUM 30 MG: 30 INJECTION SUBCUTANEOUS at 08:18

## 2020-09-15 RX ADMIN — CEFAZOLIN 3 G: 10 INJECTION, POWDER, FOR SOLUTION INTRAVENOUS at 09:10

## 2020-09-15 RX ADMIN — HYDROMORPHONE HYDROCHLORIDE 0.2 MG: 2 INJECTION, SOLUTION INTRAMUSCULAR; INTRAVENOUS; SUBCUTANEOUS at 09:30

## 2020-09-15 RX ADMIN — PANTOPRAZOLE SODIUM 40 MG: 40 INJECTION, POWDER, FOR SOLUTION INTRAVENOUS at 19:34

## 2020-09-15 RX ADMIN — SODIUM CHLORIDE, POTASSIUM CHLORIDE, SODIUM LACTATE AND CALCIUM CHLORIDE: 600; 310; 30; 20 INJECTION, SOLUTION INTRAVENOUS at 08:40

## 2020-09-15 RX ADMIN — Medication 0.8 MG: at 11:06

## 2020-09-15 RX ADMIN — METHOCARBAMOL 1000 MG: 100 INJECTION, SOLUTION INTRAMUSCULAR; INTRAVENOUS at 16:26

## 2020-09-15 RX ADMIN — Medication: at 11:56

## 2020-09-15 RX ADMIN — PROPOFOL 250 MG: 10 INJECTION, EMULSION INTRAVENOUS at 09:10

## 2020-09-15 RX ADMIN — HYDROMORPHONE HYDROCHLORIDE 0.2 MG: 2 INJECTION, SOLUTION INTRAMUSCULAR; INTRAVENOUS; SUBCUTANEOUS at 09:49

## 2020-09-15 RX ADMIN — ROCURONIUM BROMIDE 50 MG: 10 INJECTION INTRAVENOUS at 09:10

## 2020-09-15 RX ADMIN — ENOXAPARIN SODIUM 40 MG: 40 INJECTION SUBCUTANEOUS at 23:49

## 2020-09-15 SDOH — HEALTH STABILITY: MENTAL HEALTH: HOW OFTEN DO YOU HAVE A DRINK CONTAINING ALCOHOL?: NEVER

## 2020-09-15 ASSESSMENT — PULMONARY FUNCTION TESTS
PIF_VALUE: 26
PIF_VALUE: 23
PIF_VALUE: 24
PIF_VALUE: 28
PIF_VALUE: 26
PIF_VALUE: 27
PIF_VALUE: 24
PIF_VALUE: 25
PIF_VALUE: 25
PIF_VALUE: 26
PIF_VALUE: 25
PIF_VALUE: 26
PIF_VALUE: 24
PIF_VALUE: 26
PIF_VALUE: 20
PIF_VALUE: 26
PIF_VALUE: 24
PIF_VALUE: 25
PIF_VALUE: 27
PIF_VALUE: 20
PIF_VALUE: 26
PIF_VALUE: 26
PIF_VALUE: 23
PIF_VALUE: 22
PIF_VALUE: 26
PIF_VALUE: 25
PIF_VALUE: 21
PIF_VALUE: 23
PIF_VALUE: 24
PIF_VALUE: 25
PIF_VALUE: 26
PIF_VALUE: 24
PIF_VALUE: 20
PIF_VALUE: 26
PIF_VALUE: 26
PIF_VALUE: 23
PIF_VALUE: 28
PIF_VALUE: 26
PIF_VALUE: 23
PIF_VALUE: 27
PIF_VALUE: 26
PIF_VALUE: 24
PIF_VALUE: 27
PIF_VALUE: 23
PIF_VALUE: 20
PIF_VALUE: 28
PIF_VALUE: 25
PIF_VALUE: 1
PIF_VALUE: 22
PIF_VALUE: 23
PIF_VALUE: 28
PIF_VALUE: 27
PIF_VALUE: 25
PIF_VALUE: 26
PIF_VALUE: 27
PIF_VALUE: 24
PIF_VALUE: 24
PIF_VALUE: 26
PIF_VALUE: 27
PIF_VALUE: 26
PIF_VALUE: 28
PIF_VALUE: 28
PIF_VALUE: 26
PIF_VALUE: 28
PIF_VALUE: 26
PIF_VALUE: 28
PIF_VALUE: 1
PIF_VALUE: 26
PIF_VALUE: 25
PIF_VALUE: 27
PIF_VALUE: 20
PIF_VALUE: 26
PIF_VALUE: 25
PIF_VALUE: 26
PIF_VALUE: 26
PIF_VALUE: 27
PIF_VALUE: 22
PIF_VALUE: 20
PIF_VALUE: 27
PIF_VALUE: 25
PIF_VALUE: 26
PIF_VALUE: 21
PIF_VALUE: 26
PIF_VALUE: 23
PIF_VALUE: 26
PIF_VALUE: 27
PIF_VALUE: 26
PIF_VALUE: 24
PIF_VALUE: 1
PIF_VALUE: 27
PIF_VALUE: 19
PIF_VALUE: 26
PIF_VALUE: 24
PIF_VALUE: 20
PIF_VALUE: 23
PIF_VALUE: 28
PIF_VALUE: 1
PIF_VALUE: 25
PIF_VALUE: 20
PIF_VALUE: 26
PIF_VALUE: 25
PIF_VALUE: 1
PIF_VALUE: 24
PIF_VALUE: 24
PIF_VALUE: 20
PIF_VALUE: 26
PIF_VALUE: 19
PIF_VALUE: 26
PIF_VALUE: 1
PIF_VALUE: 21
PIF_VALUE: 23
PIF_VALUE: 23
PIF_VALUE: 24
PIF_VALUE: 23
PIF_VALUE: 24
PIF_VALUE: 20
PIF_VALUE: 27
PIF_VALUE: 23
PIF_VALUE: 26
PIF_VALUE: 24
PIF_VALUE: 26
PIF_VALUE: 27
PIF_VALUE: 26
PIF_VALUE: 27
PIF_VALUE: 27
PIF_VALUE: 28
PIF_VALUE: 26

## 2020-09-15 ASSESSMENT — PAIN DESCRIPTION - PAIN TYPE: TYPE: SURGICAL PAIN

## 2020-09-15 ASSESSMENT — PAIN DESCRIPTION - ONSET: ONSET: ON-GOING

## 2020-09-15 ASSESSMENT — PAIN SCALES - GENERAL
PAINLEVEL_OUTOF10: 0
PAINLEVEL_OUTOF10: 8
PAINLEVEL_OUTOF10: 8
PAINLEVEL_OUTOF10: 7
PAINLEVEL_OUTOF10: 8
PAINLEVEL_OUTOF10: 0
PAINLEVEL_OUTOF10: 6
PAINLEVEL_OUTOF10: 7

## 2020-09-15 ASSESSMENT — PAIN DESCRIPTION - LOCATION
LOCATION: ABDOMEN
LOCATION: ABDOMEN

## 2020-09-15 ASSESSMENT — PAIN - FUNCTIONAL ASSESSMENT
PAIN_FUNCTIONAL_ASSESSMENT: 0-10
PAIN_FUNCTIONAL_ASSESSMENT: PREVENTS OR INTERFERES SOME ACTIVE ACTIVITIES AND ADLS

## 2020-09-15 ASSESSMENT — PAIN DESCRIPTION - ORIENTATION: ORIENTATION: RIGHT;LEFT;LOWER

## 2020-09-15 ASSESSMENT — PAIN DESCRIPTION - PROGRESSION: CLINICAL_PROGRESSION: GRADUALLY WORSENING

## 2020-09-15 ASSESSMENT — PAIN DESCRIPTION - FREQUENCY: FREQUENCY: CONTINUOUS

## 2020-09-15 ASSESSMENT — PAIN DESCRIPTION - DESCRIPTORS: DESCRIPTORS: CONSTANT

## 2020-09-15 NOTE — H&P
Attends meetings of clubs or organizations: None     Relationship status: None    Intimate partner violence     Fear of current or ex partner: None     Emotionally abused: None     Physically abused: None     Forced sexual activity: None   Other Topics Concern    None   Social History Narrative    None         Medications Prior to Admission:      Prior to Admission medications    Medication Sig Start Date End Date Taking? Authorizing Provider   levonorgestrel (MIRENA) IUD 52 mg 1 each by Intrauterine route once    Historical Provider, MD         Allergies:  Patient has no known allergies. PHYSICAL EXAM:      /81   Pulse 71   Temp 98.1 °F (36.7 °C) (Oral)   Resp 20   Ht 5' 7\" (1.702 m)   Wt 260 lb 12 oz (118.3 kg)   SpO2 98%   BMI 40.84 kg/m²      Airway:  Airway patent with no audible stridor    Heart:  regular rate and rhythm, no murmur noted    Lungs:  No increased work of breathing, good air exchange, clear to auscultation bilaterally, no crackles or wheezing    Abdomen:  Obese, soft, non-distended, non-tender, no rebound tenderness or guarding, normal active bowel sounds and no masses palpated    ASSESSMENT AND PLAN:    1. Patient seen and focused exam done today- no new changes since last physical exam on 9-8-2020    2. Access to ancillary services are available per request of the provider.     [place], [place]     9/15/2020

## 2020-09-15 NOTE — ANESTHESIA PRE PROCEDURE
Department of Anesthesiology  Preprocedure Note       Name:  Joe Ulloa   Age:  35 y.o.  :  1990                                          MRN:  [id number]         Date:  9/15/2020      Surgeon: Ivory Stewart): Melanie Garcia DO    Procedure: Procedure(s):  ROBOTIC ASSISTED LAPAROSCOPIC VERTICAL SLEEVE GASTRECTOMY, HIATAL HERNIA REPAIR  . Medications prior to admission:   Prior to Admission medications    Medication Sig Start Date End Date Taking?  Authorizing Provider   levonorgestrel (MIRENA) IUD 52 mg 1 each by Intrauterine route once    Historical Provider, MD       Current medications:    Current Facility-Administered Medications   Medication Dose Route Frequency Provider Last Rate Last Dose    ceFAZolin (ANCEF) 3 g in dextrose 5 % 100 mL IVPB  3 g Intravenous Once Melanie Garcia DO        lactated ringers infusion   Intravenous Continuous Melanie Garcia DO        scopolamine (TRANSDERM-SCOP) transdermal patch 1 patch  1 patch Transdermal Once Melanie Garcia DO   1 patch at 09/15/20 0815    lactated ringers infusion   Intravenous Continuous Bettie Isbell MD        sodium chloride flush 0.9 % injection 10 mL  10 mL Intravenous 2 times per day Bettie Isbell MD        sodium chloride flush 0.9 % injection 10 mL  10 mL Intravenous PRN Bettie Isbell MD        lidocaine PF 1 % injection 1 mL  1 mL Intradermal Once PRN Bettie Isbell MD           Allergies:  No Known Allergies    Problem List:    Patient Active Problem List   Diagnosis Code    Migraine without aura and without status migrainosus, not intractable G43.009    Anxiety and depression F41.9, F32.9    Panic disorder F41.0    DANIEL (generalized anxiety disorder) F41.1    Morbid obesity with BMI of 40.0-44.9, adult ([facility] E66.01, Z68.41       Past Medical History:        Diagnosis Date    Acid reflux        Past Surgical History:        Procedure Laterality Date    CHOLECYSTECTOMY      UPPER GASTROINTESTINAL ENDOSCOPY N/A 2020 EGD BIOPSY performed by Danette Fritz DO at [address] History:    Social History     Tobacco Use    Smoking status: Never Smoker    Smokeless tobacco: Never Used   Substance Use Topics    Alcohol use: Yes     Comment: 2 x/ yr                                Counseling given: Not Answered      Vital Signs (Current):   Vitals:    09/15/20 0810   BP: 127/81   Pulse: 71   Resp: 20   Temp: 98.1 °F (36.7 °C)   TempSrc: Oral   SpO2: 98%   Weight: 260 lb 12 oz (118.3 kg)   Height: 5' 7\" (1.702 m)                                              BP Readings from Last 3 Encounters:   09/15/20 127/81   09/08/20 100/70   08/26/20 (!) 141/88       NPO Status:                                                                                 BMI:   Wt Readings from Last 3 Encounters:   09/15/20 260 lb 12 oz (118.3 kg)   09/08/20 273 lb (123.8 kg)   09/01/20 276 lb (125.2 kg)     Body mass index is 40.84 kg/m². CBC:   Lab Results   Component Value Date    WBC 8.0 09/08/2020    RBC 4.59 09/08/2020    HGB 13.2 09/08/2020    HCT 38.9 09/08/2020    MCV 84.8 09/08/2020    RDW 13.3 09/08/2020     09/08/2020       CMP:   Lab Results   Component Value Date     09/08/2020    K 4.2 09/08/2020     09/08/2020    CO2 22 09/08/2020    BUN 13 09/08/2020    CREATININE <0.5 09/08/2020    GFRAA >60 09/08/2020    AGRATIO 2.0 09/08/2020    LABGLOM >60 09/08/2020    GLUCOSE 85 09/08/2020    PROT 6.9 09/08/2020    CALCIUM 9.5 09/08/2020    BILITOT 2.3 09/08/2020    ALKPHOS 62 09/08/2020    AST 22 09/08/2020    ALT 38 09/08/2020       POC Tests: No results for input(s): POCGLU, POCNA, POCK, POCCL, POCBUN, POCHEMO, POCHCT in the last 72 hours.     Coags: No results found for: PROTIME, INR, APTT    HCG (If Applicable):   Lab Results   Component Value Date    PREGTESTUR Negative 09/15/2020        ABGs: No results found for: PHART, PO2ART, NQG7PYR, YAJ4SXV, BEART, C2LQJYUC     Type & Screen (If Applicable):  No results found for: LABABO, [address]    Drug/Infectious Status (If Applicable):  No results found for: HIV, HEPCAB    COVID-19 Screening (If Applicable):   Lab Results   Component Value Date    COVID19 NOT DETECTED 09/09/2020         Anesthesia Evaluation  Patient summary reviewed and Nursing notes reviewed no history of anesthetic complications:   Airway: Mallampati: II  TM distance: >3 FB   Neck ROM: full  Mouth opening: > = 3 FB Dental: normal exam         Pulmonary:Negative Pulmonary ROS                              Cardiovascular:Negative CV ROS            Rhythm: regular  Rate: normal                    Neuro/Psych:   (+) depression/anxiety             GI/Hepatic/Renal:   (+) GERD: well controlled, morbid obesity          Endo/Other: Negative Endo/Other ROS                    Abdominal:           Vascular: negative vascular ROS. Anesthesia Plan      general     ASA 3       Induction: intravenous. MIPS: Prophylactic antiemetics administered. Anesthetic plan and risks discussed with patient. Plan discussed with CRNA.                   Zen Shelton,    9/15/2020

## 2020-09-15 NOTE — PROGRESS NOTES
Department of Bariatric Surgery    Post Op Note  Subjective:  Having gas pains. N/V tolerable.     Objective:    Anesthesia type: General    Exam:   VITALS:  /70   Pulse 69   Temp 96.8 °F (36 °C) (Temporal)   Resp 11   Ht 5' 7\" (1.702 m)   Wt 260 lb 12 oz (118.3 kg)   SpO2 100%   BMI 40.84 kg/m²   24HR INTAKE/OUTPUT:    Intake/Output Summary (Last 24 hours) at 9/15/2020 1504  Last data filed at 9/15/2020 1101  Gross per 24 hour   Intake 1400 ml   Output 15 ml   Net 1385 ml     Post-op vital signs:  Stable     Assessment and Plan  Pt is a 35year old female s/p Robotic assisted Laparoscopic Gastrectomy POD #0    Pain management, PCA, robaxin, and lidoderm  FeNa:  Diet - NPO except ice , Fluids NS @ 250 ml/hour x 8 hours then 150 ml/hr  :  Watch to void  Ambulation: OOB to chair, must walk every 2 hours  Respiratory:  IS at bedside, encourage hourly IS and deep breathing  Prophylaxis: AC boots, lovenox

## 2020-09-15 NOTE — PROGRESS NOTES
PACU Transfer Note    Vitals:    09/15/20 1645   BP: 125/74   Pulse: 76   Resp: 15   Temp: 97 °F (36.1 °C)   SpO2: 100%       In: 1301 [I.V.:1301]  Out: 0     Pain assessment:  present - adequately treated  Pain Level: 7(reports hasn't pushed PCA lately - pushed per pt)    Report given to Receiving unit RN.    9/15/2020 5:04 PM

## 2020-09-15 NOTE — OP NOTE
[facility]) Physicians   [employer] [facility] [employer]              Procedure Note    Indications: The patient was evaluated by our multidisciplinary team and was found to be a good candidate for weight loss surgery. Body mass index is 40.84 kg/m². Pre-operative Diagnosis:   Patient Active Problem List   Diagnosis    Migraine without aura and without status migrainosus, not intractable    Anxiety and depression    Panic disorder    DANIEL (generalized anxiety disorder)    Morbid obesity with BMI of 40.0-44.9, adult ([address]    Hiatal hernia with GERD         Post-operative Diagnosis:   Same      Procedure:    - Robotic Sleeve Gastrectomy. - Robotic Hiatal Hernia Repair     Surgeon: Lucía Calderon DO    Anesthesia: General endotracheal anesthesia        Procedure Details   The patient was seen again in the Holding Room. The risks, benefits, complications, treatment options, and expected outcomes were discussed with the patient and/or family. The possibilities of reaction to medication, pulmonary aspiration, perforation of viscus, bleeding, recurrent infection, strictures, leaks, failure to lose weight, regaining weight,  the need for additional procedures, failure to diagnose a condition, and creating a complication requiring transfusion or operation were discussed. There was concurrence with the proposed plan and informed consent was obtained. The site of surgery was properly noted/marked. The patient was taken to Operating Room, identified as Bryn Frank and the procedure verified as Laparoscopic Sleeve Gastrectomy. A Time Out was held and the above information confirmed. The patient was placed in the supine position and general anesthesia was induced, along with placement of orogastric tube and SCD's. Lovenox SQ given pre-operatively. IV Antibiotics given pre-operatively. All pressure points were padded properly.      A left upper quadrant incision was made and a veres needle was inserted after

## 2020-09-16 VITALS
TEMPERATURE: 97.2 F | OXYGEN SATURATION: 100 % | BODY MASS INDEX: 40.92 KG/M2 | HEIGHT: 67 IN | SYSTOLIC BLOOD PRESSURE: 112 MMHG | RESPIRATION RATE: 16 BRPM | WEIGHT: 260.75 LBS | DIASTOLIC BLOOD PRESSURE: 73 MMHG | HEART RATE: 72 BPM

## 2020-09-16 LAB
ANION GAP SERPL CALCULATED.3IONS-SCNC: 13 MMOL/L (ref 3–16)
BUN BLDV-MCNC: 4 MG/DL (ref 7–20)
CALCIUM SERPL-MCNC: 8.5 MG/DL (ref 8.3–10.6)
CHLORIDE BLD-SCNC: 101 MMOL/L (ref 99–110)
CO2: 20 MMOL/L (ref 21–32)
CREAT SERPL-MCNC: 0.5 MG/DL (ref 0.6–1.1)
GFR AFRICAN AMERICAN: >60
GFR NON-AFRICAN AMERICAN: >60
GLUCOSE BLD-MCNC: 85 MG/DL (ref 70–99)
HCT VFR BLD CALC: 36.7 % (ref 36–48)
HEMOGLOBIN: 12.4 G/DL (ref 12–16)
MCH RBC QN AUTO: 28.6 PG (ref 26–34)
MCHC RBC AUTO-ENTMCNC: 33.9 G/DL (ref 31–36)
MCV RBC AUTO: 84.5 FL (ref 80–100)
PDW BLD-RTO: 13.4 % (ref 12.4–15.4)
PLATELET # BLD: 229 K/UL (ref 135–450)
PMV BLD AUTO: 8.6 FL (ref 5–10.5)
POTASSIUM SERPL-SCNC: 4.4 MMOL/L (ref 3.5–5.1)
RBC # BLD: 4.34 M/UL (ref 4–5.2)
SODIUM BLD-SCNC: 134 MMOL/L (ref 136–145)
WBC # BLD: 13.2 K/UL (ref 4–11)

## 2020-09-16 PROCEDURE — 2580000003 HC RX 258: Performed by: SURGERY

## 2020-09-16 PROCEDURE — 80048 BASIC METABOLIC PNL TOTAL CA: CPT

## 2020-09-16 PROCEDURE — 6370000000 HC RX 637 (ALT 250 FOR IP): Performed by: NURSE PRACTITIONER

## 2020-09-16 PROCEDURE — 36415 COLL VENOUS BLD VENIPUNCTURE: CPT

## 2020-09-16 PROCEDURE — C9113 INJ PANTOPRAZOLE SODIUM, VIA: HCPCS | Performed by: SURGERY

## 2020-09-16 PROCEDURE — 85027 COMPLETE CBC AUTOMATED: CPT

## 2020-09-16 PROCEDURE — 6360000002 HC RX W HCPCS: Performed by: SURGERY

## 2020-09-16 PROCEDURE — 2580000003 HC RX 258: Performed by: NURSE PRACTITIONER

## 2020-09-16 PROCEDURE — 94761 N-INVAS EAR/PLS OXIMETRY MLT: CPT

## 2020-09-16 PROCEDURE — 94770 HC ETCO2 MONITOR DAILY: CPT

## 2020-09-16 PROCEDURE — 6360000002 HC RX W HCPCS: Performed by: NURSE PRACTITIONER

## 2020-09-16 RX ORDER — OMEPRAZOLE 20 MG/1
20 CAPSULE, DELAYED RELEASE ORAL DAILY
Qty: 30 CAPSULE | Refills: 3 | Status: SHIPPED | OUTPATIENT
Start: 2020-09-16 | End: 2021-04-16 | Stop reason: ALTCHOICE

## 2020-09-16 RX ORDER — ONDANSETRON 4 MG/1
4 TABLET, FILM COATED ORAL EVERY 6 HOURS PRN
Qty: 30 TABLET | Refills: 0 | Status: SHIPPED | OUTPATIENT
Start: 2020-09-16 | End: 2020-12-29 | Stop reason: ALTCHOICE

## 2020-09-16 RX ORDER — OXYCODONE HCL 5 MG/5 ML
5 SOLUTION, ORAL ORAL EVERY 4 HOURS PRN
Status: DISCONTINUED | OUTPATIENT
Start: 2020-09-16 | End: 2020-09-16 | Stop reason: HOSPADM

## 2020-09-16 RX ORDER — OXYCODONE HCL 5 MG/5 ML
10 SOLUTION, ORAL ORAL EVERY 4 HOURS PRN
Status: DISCONTINUED | OUTPATIENT
Start: 2020-09-16 | End: 2020-09-16 | Stop reason: HOSPADM

## 2020-09-16 RX ORDER — SIMETHICONE 80 MG
80 TABLET,CHEWABLE ORAL EVERY 6 HOURS PRN
Status: DISCONTINUED | OUTPATIENT
Start: 2020-09-16 | End: 2020-09-16 | Stop reason: HOSPADM

## 2020-09-16 RX ORDER — OXYCODONE HYDROCHLORIDE AND ACETAMINOPHEN 5; 325 MG/1; MG/1
1 TABLET ORAL EVERY 6 HOURS PRN
Qty: 28 TABLET | Refills: 0 | Status: SHIPPED | OUTPATIENT
Start: 2020-09-16 | End: 2020-09-23

## 2020-09-16 RX ORDER — KETOROLAC TROMETHAMINE 30 MG/ML
30 INJECTION, SOLUTION INTRAMUSCULAR; INTRAVENOUS ONCE
Status: COMPLETED | OUTPATIENT
Start: 2020-09-16 | End: 2020-09-16

## 2020-09-16 RX ORDER — SODIUM CHLORIDE 9 MG/ML
INJECTION, SOLUTION INTRAVENOUS CONTINUOUS
Status: DISCONTINUED | OUTPATIENT
Start: 2020-09-15 | End: 2020-09-16 | Stop reason: HOSPADM

## 2020-09-16 RX ORDER — HYOSCYAMINE SULFATE 0.12 MG/1
1 TABLET SUBLINGUAL EVERY 6 HOURS PRN
Qty: 30 EACH | Refills: 0 | Status: SHIPPED | OUTPATIENT
Start: 2020-09-16 | End: 2021-04-16 | Stop reason: ALTCHOICE

## 2020-09-16 RX ADMIN — METHOCARBAMOL 1000 MG: 100 INJECTION, SOLUTION INTRAMUSCULAR; INTRAVENOUS at 06:50

## 2020-09-16 RX ADMIN — KETOROLAC TROMETHAMINE 30 MG: 30 INJECTION, SOLUTION INTRAMUSCULAR at 13:18

## 2020-09-16 RX ADMIN — SODIUM CHLORIDE: 9 INJECTION, SOLUTION INTRAVENOUS at 11:26

## 2020-09-16 RX ADMIN — ENOXAPARIN SODIUM 40 MG: 40 INJECTION SUBCUTANEOUS at 08:55

## 2020-09-16 RX ADMIN — Medication 10 ML: at 08:56

## 2020-09-16 RX ADMIN — PANTOPRAZOLE SODIUM 40 MG: 40 INJECTION, POWDER, FOR SOLUTION INTRAVENOUS at 08:55

## 2020-09-16 RX ADMIN — HYDROMORPHONE HYDROCHLORIDE 0.5 MG: 1 INJECTION, SOLUTION INTRAMUSCULAR; INTRAVENOUS; SUBCUTANEOUS at 07:35

## 2020-09-16 ASSESSMENT — PAIN DESCRIPTION - ONSET
ONSET: ON-GOING
ONSET: ON-GOING

## 2020-09-16 ASSESSMENT — PAIN DESCRIPTION - DESCRIPTORS
DESCRIPTORS: CONSTANT
DESCRIPTORS: CONSTANT;DISCOMFORT

## 2020-09-16 ASSESSMENT — PAIN DESCRIPTION - PAIN TYPE
TYPE: SURGICAL PAIN
TYPE: SURGICAL PAIN

## 2020-09-16 ASSESSMENT — PAIN DESCRIPTION - LOCATION
LOCATION: ABDOMEN
LOCATION: ABDOMEN

## 2020-09-16 ASSESSMENT — PAIN DESCRIPTION - FREQUENCY
FREQUENCY: CONTINUOUS
FREQUENCY: CONTINUOUS

## 2020-09-16 ASSESSMENT — PAIN DESCRIPTION - PROGRESSION
CLINICAL_PROGRESSION: NOT CHANGED
CLINICAL_PROGRESSION: NOT CHANGED

## 2020-09-16 ASSESSMENT — PAIN DESCRIPTION - ORIENTATION
ORIENTATION: RIGHT;LEFT
ORIENTATION: RIGHT;LEFT

## 2020-09-16 ASSESSMENT — PAIN - FUNCTIONAL ASSESSMENT
PAIN_FUNCTIONAL_ASSESSMENT: PREVENTS OR INTERFERES SOME ACTIVE ACTIVITIES AND ADLS
PAIN_FUNCTIONAL_ASSESSMENT: PREVENTS OR INTERFERES SOME ACTIVE ACTIVITIES AND ADLS

## 2020-09-16 ASSESSMENT — PAIN SCALES - GENERAL
PAINLEVEL_OUTOF10: 6
PAINLEVEL_OUTOF10: 7

## 2020-09-16 NOTE — PROGRESS NOTES
Patient received discharge orders. IV's removed. Reviewed discharge instructions, she verbalized understanding. Medications filled by outpatient pharmacy and given to patient. Wheelchair transport given to main entrance, where family arrived to transport home.

## 2020-09-16 NOTE — DISCHARGE SUMMARY
Patient ID:  Ace Espinoza  [id number]  5 y.o.  1990    Admit date: 9/15/2020    Discharge date and time: 9/16/20    Admitting Physician: Danette Fritz DO     Discharge Physician: same    Admission Diagnoses: Morbid obesity ([facility] [E66.01]  Hiatal hernia [K44.9]  Morbid obesity with BMI of 40.0-44.9, adult ([facility] [E66.01, Z68.41]    Discharge Diagnoses: same    Admission Condition: good    Discharged Condition: stable    Indication for Admission: Surgery: Robotic assisted Laparoscopic Sleeve Gastrectomy, IV hydration, monitoring, pain and nausea management    Hospital Course: 35 y.o. female admitted with morbid obesity who underwent Robotic assisted laparoscopic sleeve gastrectomy. Surgery was uneventful and she was admitted to bariatric post-operative surgical floor in stable condition for IV hydration, monitoring and pain and nausea management. The following morning the pain was tolerable on po pain medication and was taking adequate po. She was discharged in stable condition. Treatments: IV hydration        Disposition: home    Patient Instructions: Activity: activity as tolerated and no driving while on analgesics  Diet: clear liquids  Wound Care: as directed    Follow-up with Dr. Sarahi Andrews in two weeks.         Signed:  Ally Flaherty  9/16/2020  2:47 PM

## 2020-09-16 NOTE — PLAN OF CARE
Problem: Pain:  Goal: Pain level will decrease  Description: Pain level will decrease  Outcome: Ongoing  Note: Pain has been managed with ice packs and pca pump. Will continue to monitor. Problem: Falls - Risk of:  Goal: Will remain free from falls  Description: Will remain free from falls  Outcome: Ongoing  Note: Bed alarm on, nonskid socks applied.   Will continue to monitor

## 2020-09-16 NOTE — CARE COORDINATION
Case Management Assessment            Discharge Note                    Date / Time of Note: 9/16/2020 2:53 PM                  Discharge Note Completed by: Supa Lantigua    Patient Name: Sherri Spivey   YOB: 1990  Diagnosis: Morbid obesity ([facility] [E66.01]  Hiatal hernia [K44.9]  Morbid obesity with BMI of 40.0-44.9, adult ([facility] [E66.01, Z68.41]   Date / Time: 9/15/2020  7:34 AM    Current PCP: LAURENCE Key  Clinic patient: No    Hospitalization in the last 30 days: No    Advance Directives:  Code Status: Full Code  [place] DNR form completed and on chart: No    Financial:  Payor: Urbano Collazo / Plan: Leona Jeri / Product Type: *No Product type* /      Pharmacy:    [id number] [facility], [address] [place] [address]  [address]  Phone: [phone] Fax: [phone]      Assistance purchasing medications?: Potential Assistance Purchasing Medications: No  Assistance provided by Case Management: None at this time    Does patient want to participate in local refill/ meds to beds program?: Yes    Meds To Beds General Rules:  1. Can ONLY be done Monday- Friday between 8:30am-5pm  2. Prescription(s) must be in pharmacy by 3pm to be filled same day  3. Copy of patient's insurance/ prescription drug card and patient face sheet must be sent along with the prescription(s)  4. Cost of Rx cannot be added to hospital bill. If financial assistance is needed, please contact unit  or ;  or  CANNOT provide pharmacy voucher for patients co-pays  5.  Patients can then  the prescription on their way out of the hospital at discharge, or pharmacy can deliver to the bedside if staff is available. (payment due at time of pick-up or delivery - cash, check, or card accepted)     Able to afford home medications/ co-pay costs: Yes    ADLS:  Current PT AM-PAC Score:   /24  Current OT AM-PAC Score:   /24      DISCHARGE Disposition: Home- No Services Needed    LOC at discharge: Not Applicable  ELDA Completed: Not Indicated    Notification completed in HENS/PAS?:  Not Applicable    IMM Completed:   Not Indicated    Transportation:  Transportation PLAN for discharge: family   Mode of Transport: [address] ordered at discharge: Not [address]: Not Applicable  Orders faxed: No    Durable Medical Equipment:  DME Provider: none  Equipment obtained during hospitalization:     Home Oxygen and Respiratory Equipment:  Oxygen needed at discharge?: Not [address]: Not Applicable  Portable tank available for discharge?: Not Indicated    Dialysis:  Dialysis patient: No    Dialysis Center:  Not Applicable      Additional CM Notes: pt from home will return home no needs    The Plan for Transition of Care is related to the following treatment goals of Morbid obesity ([facility] [E66.01]  Hiatal hernia [K44.9]  Morbid obesity with BMI of 40.0-44.9, adult ([facility] [E66.01, Z68.41]    The Patient and/or patient representative Pamela Jernigan and her family were provided with a choice of provider and agrees with the discharge plan Yes    Freedom of choice list was provided with basic dialogue that supports the patient's individualized plan of care/goals and shares the quality data associated with the providers.  Not Indicated    Care Transitions patient: Yes    Jazmyne Bowden RN  [facility]  Case Management Department  Ph: [phone]  Fax: [phone]

## 2020-09-16 NOTE — PLAN OF CARE
Problem: Pain:  Goal: Pain level will decrease  Description: Pain level will decrease  9/16/2020 1333 by Kisha Butterfield RN  Outcome: Ongoing  Note: Pt has complained of surgical pain. Given pain medications, per order. Rest and ice packs for comfort. Pt resting comfortably in bed. Problem: Falls - Risk of:  Goal: Will remain free from falls  Description: Will remain free from falls  9/16/2020 1333 by Kisha Butterfield RN  Outcome: Ongoing  Note: VSS. Non-skid socks on. Bed in lowest position, call light in reach, rounded frequently. Ambulate in halls with assistance.

## 2020-09-16 NOTE — PROGRESS NOTES
Surgery Daily Progress Note  Dilan Nugent  CC: Morbid Obesity  Subjective :  No emesis overnight. Some nausea. Pain is 8/10. Did not sleep all night. Also feeling a lot of gas pains in her shoulders and under her ribs. Has been up OOB and ambulating. Objective    Infusions:   sodium chloride      HYDROmorphone          I/O:I/O last 3 completed shifts: In: 2930 [I.V.:2930]  Out: 12 [Urine:600; Blood:15]           Wt Readings from Last 1 Encounters:   09/15/20 260 lb 12 oz (118.3 kg)                      Exam:/75   Pulse 69   Temp 97.9 °F (36.6 °C) (Oral)   Resp 16   Ht 5' 7\" (1.702 m)   Wt 260 lb 12 oz (118.3 kg)   SpO2 98%   Breastfeeding No   BMI 40.84 kg/m²   General appearance: alert, appears stated age and cooperative  Lungs: clear to auscultation bilaterally  Heart: regular rate and rhythm, S1, S2 normal, no murmur, click, rub or gallop  Abdomen: soft, appropriately-tender; bowel sounds quiet  Trocar sites well approx      ASSESSMENT/PLAN: Pt. is a 35 y.o. female s/p Robotic Assisted Laparoscopic Sleeve Gastrectomy, HHR POD# 1  Overall, doing well  Start on Bariatric Clears  Pharmacy to review home meds -pills to be crushed for 2 weeks post op    Monitor progress throughout the day. D/C late afternoon as long as able to take adequate po to remain hydrated without IVF, voiding, pain and nausea tolerable with oral meds, using IS frequently and ambulating.     D/W Dr. Blanton See 9/16/2020 6:19 AM  [phone]

## 2020-09-17 ENCOUNTER — TELEPHONE (OUTPATIENT)
Dept: FAMILY MEDICINE CLINIC | Age: 30
End: 2020-09-17

## 2020-09-17 NOTE — TELEPHONE ENCOUNTER
[facility] Transitions Initial Follow Up Call    Call within 2 business days of discharge: Yes     Patient: Sumanth Murcia Patient : 1990 MRN: <[id number]>        RARS: Readmission Risk Score: 7       Spoke with: Pt to f/u with Dr. Garcia Dear. Discharge department/facility: [facility]    Non-face-to-face services provided:       Follow Up  Future Appointments   Date Time Provider [place]   10/5/2020  9:00 AM Carlos Romano LPN

## 2020-09-21 ENCOUNTER — TELEPHONE (OUTPATIENT)
Dept: BARIATRICS/WEIGHT MGMT | Age: 30
End: 2020-09-21

## 2020-10-05 ENCOUNTER — OFFICE VISIT (OUTPATIENT)
Dept: BARIATRICS/WEIGHT MGMT | Age: 30
End: 2020-10-05

## 2020-10-05 VITALS
HEART RATE: 83 BPM | HEIGHT: 67 IN | WEIGHT: 247.8 LBS | BODY MASS INDEX: 38.89 KG/M2 | TEMPERATURE: 96.7 F | DIASTOLIC BLOOD PRESSURE: 82 MMHG | SYSTOLIC BLOOD PRESSURE: 120 MMHG

## 2020-10-05 PROCEDURE — 99024 POSTOP FOLLOW-UP VISIT: CPT | Performed by: SURGERY

## 2020-10-05 NOTE — PROGRESS NOTES
[facility]) Physicians   General & Laparoscopic Surgery  [employer]       HPI:     Olu Anne is a very pleasant 35 y.o. obese female , Body mass index is 38.81 kg/m². Valentina  And multiple medical problems who is presenting for bariatric follow up care. Olu Anne is s/p laparoscopic sleeve gastrectomy by me   Comes today to the clinic without any complaints. Patient denies any nausea, vomiting, fevers, chills, shortness of breath, chest pain, constipation or urinary symptoms. Denies any heartburn nor dysphagia. Patient is feeling very well, and is very active. Patient is very pleased with the weight loss and resolution of co-morbid conditions. Past Medical History:   Diagnosis Date    Acid reflux     Broken tooth      Past Surgical History:   Procedure Laterality Date    CHOLECYSTECTOMY      SLEEVE GASTRECTOMY N/A 9/15/2020    ROBOTIC ASSISTED LAPAROSCOPIC VERTICAL SLEEVE GASTRECTOMY, HIATAL HERNIA REPAIR performed by Brandie Go DO at [address] [place] [address] ENDOSCOPY N/A 6/8/2020    EGD BIOPSY performed by Brandie Go DO at [facility] ENDOSCOPY     Family History   Problem Relation Age of Onset    Other Mother         hyperactive thyroid    Other Maternal Aunt         hyperactive thyroid    Heart Disease Maternal Grandmother     Hypertension Maternal Grandmother     Elevated Lipids Maternal Grandmother     Heart Disease Maternal Grandfather     Hypertension Maternal Grandfather     Elevated Lipids Maternal Grandfather     Breast Cancer Paternal Grandmother      Social History     Tobacco Use    Smoking status: Never Smoker    Smokeless tobacco: Never Used   Substance Use Topics    Alcohol use: Yes     Frequency: Never     Comment: 2 x/ yr     I counseled the patient on the importance of not smoking and risks of ETOH.    No Known Allergies  Vitals:    10/05/20 0923   BP: 120/82   Pulse: 83   Temp: 96.7 °F (35.9 °C)   Weight: 247 lb 12.8 oz (112.4 kg) Height: 5' 7\" (1.702 m)       Body mass index is 38.81 kg/m². Current Outpatient Medications:     Multiple Vitamins-Minerals (BARIATRIC FUSION PO), Take by mouth, Disp: , Rfl:     Hyoscyamine Sulfate SL (LEVSIN/SL) 0.125 MG SUBL, Place 1 tablet under the tongue every 6 hours as needed (spasm), Disp: 30 each, Rfl: 0    omeprazole (PRILOSEC) 20 MG delayed release capsule, Take 1 capsule by mouth Daily, Disp: 30 capsule, Rfl: 3    ondansetron (ZOFRAN) 4 MG tablet, Take 1 tablet by mouth every 6 hours as needed for Nausea or Vomiting, Disp: 30 tablet, Rfl: 0    levonorgestrel (MIRENA) IUD 52 mg, 1 each by Intrauterine route once, Disp: , Rfl:       Review of Systems - History obtained from the patient  General ROS: negative  Psychological ROS: negative  Hematological and Lymphatic ROS: negative  Endocrine ROS: negative  Respiratory ROS: negative  Cardiovascular ROS: negative  Gastrointestinal ROS:negative  Genito-Urinary ROS: negative  Musculoskeletal ROS: negative   Skin ROS: negative    Physical Exam   Vitals Reviewed   Constitutional: Patient is oriented to person, place, and time. Patient appears well-developed and well-nourished. Patient is active and cooperative. Non-toxic appearance. No distress. Neck: Trachea normal and normal range of motion. No JVD present. Pulmonary/Chest: Effort normal. No accessory muscle usage or stridor. No apnea. No respiratory distress. Cardiovascular: Normal rate and no JVD. Abdominal: Normal appearance. Patient exhibits no distension. Abdomen is soft, obese, non tender. Musculoskeletal: Normal range of motion. Patient exhibits no edema. Neurological: Patient is alert and oriented to person, place, and time. Patient has normal strength. GCS eye subscore is 4. GCS verbal subscore is 5. GCS motor subscore is 6. Skin: Skin is warm and dry. No abrasion and no rash noted. Patient is not diaphoretic. No cyanosis or erythema.    Psychiatric: Patient has a normal mood and affect. Speech is normal and behavior is normal. Cognition and memory are normal.         A/P     Kylee File is 35 y.o. female , now with Body mass index is 38.81 kg/m². s/p Sleeve gastrectomy, has lost 28 lbs since last visit, total of 60 lbs weight loss. The patient underwent dietary counseling with registered dietician. I have reviewed, discussed and agree with the dietary plan. Patient is trying hard to keep good dietary and behavior modifications. Patient is monitoring portion sizes, food choices and liquid calories. Patient is trying to exercise regularly. Patient pleased with the surgery outcomes. We discussed how her weight affects her overall health including:  Martha Srivastava was seen today for follow up after procedure. Diagnoses and all orders for this visit:    Status post laparoscopic sleeve gastrectomy       and importance of weight loss to alleviate those co morbid conditions. I encouraged the patient to continue exercise and keeping healthy eating habits. Also counseled the patient extensively on post surgery care. I spent 15 minutes face to face with patient with more than 50% of the time counseling and/or coordinating care for post-bariatric surgical care. RTC in 1 months  Diet and Exercise      Patient advised that its their responsibility to follow up for studies and/or labs ordered today.

## 2020-10-05 NOTE — PROGRESS NOTES
Dietary Assessment Note      Vitals:   Vitals:    10/05/20 0923   BP: 120/82   Pulse: 83   Temp: 96.7 °F (35.9 °C)   Weight: 247 lb 12.8 oz (112.4 kg)   Height: 5' 7\" (1.702 m)    Patient lost 28.2 lbs over 1 month. Total Weight Loss: 60 lbs    Labs reviewed: no lab studies available for review at time of visit    Protein intake: 60-80 grams/day     Fluid intake: 48-64 oz/day - water    Multivitamin/mineral intake: yes, 4 Fusion    Calcium intake: no    Other: none    Exercise: walking    Nutrition Assessment: 2wks s/p gastric sleeve post-op visit. 2 protein shakes daily with skim milk/water, tried puree CC, sf applesauce, blended tuna, mashed potato.     Amount able to eat per sittinoz    Following  rule: yes    Food Intolerances/issues: tolerating protein shake, + nausea at times    Client Concerns: none    Goals:  - Continue to eat 5-6x/day, focusing on protein foods first  - Try different protein flavor/unflavored or Protein water to meet goal (60-80g) as needed  - Phase 3 diet provided and reviewed, all questions/concerns addressed    Plan: F/U at 6wks and per Provider    [address]

## 2020-10-08 ENCOUNTER — APPOINTMENT (OUTPATIENT)
Dept: CT IMAGING | Age: 30
End: 2020-10-08
Payer: COMMERCIAL

## 2020-10-08 ENCOUNTER — APPOINTMENT (OUTPATIENT)
Dept: GENERAL RADIOLOGY | Age: 30
End: 2020-10-08
Payer: COMMERCIAL

## 2020-10-08 ENCOUNTER — HOSPITAL ENCOUNTER (EMERGENCY)
Age: 30
Discharge: HOME | End: 2020-10-08
Attending: EMERGENCY MEDICINE
Payer: COMMERCIAL

## 2020-10-08 ENCOUNTER — PATIENT MESSAGE (OUTPATIENT)
Dept: BARIATRICS/WEIGHT MGMT | Age: 30
End: 2020-10-08

## 2020-10-08 VITALS
RESPIRATION RATE: 17 BRPM | SYSTOLIC BLOOD PRESSURE: 117 MMHG | HEART RATE: 69 BPM | HEIGHT: 67 IN | DIASTOLIC BLOOD PRESSURE: 85 MMHG | TEMPERATURE: 98.4 F | OXYGEN SATURATION: 100 % | BODY MASS INDEX: 37.83 KG/M2 | WEIGHT: 241 LBS

## 2020-10-08 LAB
A/G RATIO: 1.5 (ref 1.1–2.2)
ALBUMIN SERPL-MCNC: 4.7 G/DL (ref 3.4–5)
ALP BLD-CCNC: 59 U/L (ref 40–129)
ALT SERPL-CCNC: 69 U/L (ref 10–40)
ANION GAP SERPL CALCULATED.3IONS-SCNC: 17 MMOL/L (ref 3–16)
AST SERPL-CCNC: 40 U/L (ref 15–37)
BASOPHILS ABSOLUTE: 0 K/UL (ref 0–0.2)
BASOPHILS RELATIVE PERCENT: 0.6 %
BILIRUB SERPL-MCNC: 1.5 MG/DL (ref 0–1)
BUN BLDV-MCNC: 11 MG/DL (ref 7–20)
CALCIUM SERPL-MCNC: 9.3 MG/DL (ref 8.3–10.6)
CHLORIDE BLD-SCNC: 100 MMOL/L (ref 99–110)
CO2: 21 MMOL/L (ref 21–32)
CREAT SERPL-MCNC: <0.5 MG/DL (ref 0.6–1.1)
D DIMER: 208 NG/ML DDU (ref 0–229)
EOSINOPHILS ABSOLUTE: 0.2 K/UL (ref 0–0.6)
EOSINOPHILS RELATIVE PERCENT: 2.7 %
GFR AFRICAN AMERICAN: >60
GFR NON-AFRICAN AMERICAN: >60
GLOBULIN: 3.1 G/DL
GLUCOSE BLD-MCNC: 92 MG/DL (ref 70–99)
HCG QUALITATIVE: NEGATIVE
HCT VFR BLD CALC: 41.9 % (ref 36–48)
HEMOGLOBIN: 14.1 G/DL (ref 12–16)
LYMPHOCYTES ABSOLUTE: 2.8 K/UL (ref 1–5.1)
LYMPHOCYTES RELATIVE PERCENT: 34.6 %
MCH RBC QN AUTO: 28.5 PG (ref 26–34)
MCHC RBC AUTO-ENTMCNC: 33.7 G/DL (ref 31–36)
MCV RBC AUTO: 84.6 FL (ref 80–100)
MONOCYTES ABSOLUTE: 1.1 K/UL (ref 0–1.3)
MONOCYTES RELATIVE PERCENT: 13.7 %
NEUTROPHILS ABSOLUTE: 3.9 K/UL (ref 1.7–7.7)
NEUTROPHILS RELATIVE PERCENT: 48.4 %
PDW BLD-RTO: 13.9 % (ref 12.4–15.4)
PLATELET # BLD: 185 K/UL (ref 135–450)
PMV BLD AUTO: 9.5 FL (ref 5–10.5)
POTASSIUM SERPL-SCNC: 3.1 MMOL/L (ref 3.5–5.1)
RBC # BLD: 4.95 M/UL (ref 4–5.2)
SODIUM BLD-SCNC: 138 MMOL/L (ref 136–145)
TOTAL PROTEIN: 7.8 G/DL (ref 6.4–8.2)
WBC # BLD: 8.2 K/UL (ref 4–11)

## 2020-10-08 PROCEDURE — 6370000000 HC RX 637 (ALT 250 FOR IP): Performed by: EMERGENCY MEDICINE

## 2020-10-08 PROCEDURE — 71260 CT THORAX DX C+: CPT

## 2020-10-08 PROCEDURE — 85379 FIBRIN DEGRADATION QUANT: CPT

## 2020-10-08 PROCEDURE — 99285 EMERGENCY DEPT VISIT HI MDM: CPT

## 2020-10-08 PROCEDURE — 2580000003 HC RX 258: Performed by: EMERGENCY MEDICINE

## 2020-10-08 PROCEDURE — 93005 ELECTROCARDIOGRAM TRACING: CPT | Performed by: EMERGENCY MEDICINE

## 2020-10-08 PROCEDURE — 6360000002 HC RX W HCPCS: Performed by: EMERGENCY MEDICINE

## 2020-10-08 PROCEDURE — 96375 TX/PRO/DX INJ NEW DRUG ADDON: CPT

## 2020-10-08 PROCEDURE — 84703 CHORIONIC GONADOTROPIN ASSAY: CPT

## 2020-10-08 PROCEDURE — 6360000004 HC RX CONTRAST MEDICATION: Performed by: EMERGENCY MEDICINE

## 2020-10-08 PROCEDURE — 96374 THER/PROPH/DIAG INJ IV PUSH: CPT

## 2020-10-08 PROCEDURE — 74177 CT ABD & PELVIS W/CONTRAST: CPT

## 2020-10-08 PROCEDURE — 80053 COMPREHEN METABOLIC PANEL: CPT

## 2020-10-08 PROCEDURE — 71046 X-RAY EXAM CHEST 2 VIEWS: CPT

## 2020-10-08 PROCEDURE — 85025 COMPLETE CBC W/AUTO DIFF WBC: CPT

## 2020-10-08 RX ORDER — METOCLOPRAMIDE HYDROCHLORIDE 5 MG/ML
10 INJECTION INTRAMUSCULAR; INTRAVENOUS ONCE
Status: COMPLETED | OUTPATIENT
Start: 2020-10-08 | End: 2020-10-08

## 2020-10-08 RX ORDER — DEXAMETHASONE SODIUM PHOSPHATE 10 MG/ML
10 INJECTION INTRAMUSCULAR; INTRAVENOUS ONCE
Status: COMPLETED | OUTPATIENT
Start: 2020-10-08 | End: 2020-10-08

## 2020-10-08 RX ORDER — BUTALBITAL, ACETAMINOPHEN AND CAFFEINE 300; 40; 50 MG/1; MG/1; MG/1
1-2 CAPSULE ORAL EVERY 8 HOURS PRN
Qty: 12 CAPSULE | Refills: 0 | Status: SHIPPED | OUTPATIENT
Start: 2020-10-08 | End: 2021-04-16 | Stop reason: ALTCHOICE

## 2020-10-08 RX ORDER — BUTALBITAL, ACETAMINOPHEN AND CAFFEINE 50; 325; 40 MG/1; MG/1; MG/1
2 TABLET ORAL ONCE
Status: COMPLETED | OUTPATIENT
Start: 2020-10-08 | End: 2020-10-08

## 2020-10-08 RX ORDER — DIPHENHYDRAMINE HYDROCHLORIDE 50 MG/ML
25 INJECTION INTRAMUSCULAR; INTRAVENOUS ONCE
Status: COMPLETED | OUTPATIENT
Start: 2020-10-08 | End: 2020-10-08

## 2020-10-08 RX ORDER — 0.9 % SODIUM CHLORIDE 0.9 %
1000 INTRAVENOUS SOLUTION INTRAVENOUS ONCE
Status: COMPLETED | OUTPATIENT
Start: 2020-10-08 | End: 2020-10-08

## 2020-10-08 RX ADMIN — DEXAMETHASONE SODIUM PHOSPHATE 10 MG: 10 INJECTION INTRAMUSCULAR; INTRAVENOUS at 22:07

## 2020-10-08 RX ADMIN — SODIUM CHLORIDE 1000 ML: 9 INJECTION, SOLUTION INTRAVENOUS at 22:07

## 2020-10-08 RX ADMIN — BUTALBITAL, ACETAMINOPHEN, AND CAFFEINE 2 TABLET: 50; 325; 40 TABLET ORAL at 23:00

## 2020-10-08 RX ADMIN — METOCLOPRAMIDE 10 MG: 5 INJECTION, SOLUTION INTRAMUSCULAR; INTRAVENOUS at 22:07

## 2020-10-08 RX ADMIN — DIPHENHYDRAMINE HYDROCHLORIDE 25 MG: 50 INJECTION, SOLUTION INTRAMUSCULAR; INTRAVENOUS at 22:07

## 2020-10-08 RX ADMIN — POTASSIUM BICARBONATE 40 MEQ: 782 TABLET, EFFERVESCENT ORAL at 23:37

## 2020-10-08 RX ADMIN — IOPAMIDOL 75 ML: 755 INJECTION, SOLUTION INTRAVENOUS at 22:00

## 2020-10-08 ASSESSMENT — PAIN SCALES - GENERAL
PAINLEVEL_OUTOF10: 7
PAINLEVEL_OUTOF10: 7

## 2020-10-08 ASSESSMENT — PAIN DESCRIPTION - DESCRIPTORS: DESCRIPTORS: ACHING;CONSTANT

## 2020-10-08 ASSESSMENT — PAIN DESCRIPTION - ORIENTATION: ORIENTATION: LEFT

## 2020-10-08 ASSESSMENT — PAIN DESCRIPTION - LOCATION: LOCATION: CHEST

## 2020-10-08 ASSESSMENT — PAIN DESCRIPTION - PAIN TYPE: TYPE: ACUTE PAIN

## 2020-10-09 ENCOUNTER — CARE COORDINATION (OUTPATIENT)
Dept: CASE MANAGEMENT | Age: 30
End: 2020-10-09

## 2020-10-09 LAB
EKG ATRIAL RATE: 81 BPM
EKG DIAGNOSIS: NORMAL
EKG P AXIS: 22 DEGREES
EKG P-R INTERVAL: 172 MS
EKG Q-T INTERVAL: 374 MS
EKG QRS DURATION: 94 MS
EKG QTC CALCULATION (BAZETT): 434 MS
EKG R AXIS: 56 DEGREES
EKG T AXIS: -15 DEGREES
EKG VENTRICULAR RATE: 81 BPM

## 2020-10-09 PROCEDURE — 93010 ELECTROCARDIOGRAM REPORT: CPT | Performed by: INTERNAL MEDICINE

## 2020-10-09 NOTE — ED PROVIDER NOTES
eMERGENCY dEPARTMENT eNCOUnter      PtName: Rashad Burr  MRN: [id number]  [place] 1990  Date of evaluation: 10/8/2020  Provider: José Antonio Hampton, [address] [place] [address]       Chief Complaint   Patient presents with    Chest Pain     left sided chest pain/worsens with movement/gastric sleeve 9/15/2020         HISTORY OF PRESENT ILLNESS   (Location/Symptom, Timing/Onset,Context/Setting, Quality, Duration, Modifying Factors, Severity) Note limiting factors. HPI    Rashad Burr is a 35 y.o. female who presents to the emergency department with chief complaint of chest pain and headache. Chest pain started this morning, waxing and waning currently 6/10, left, sharp without radiation. No alleviating or aggravating factors. Approximately 3 weeks ago she had a gastric sleeve performed at [facility]  Denies history of DVT, PE, leg swelling or calf pain. She reports a headache described as similar to previous migraines. Generalized. Achy, 7/10. No fever. No neck stiffness. No vision change, facial droop, slurred speech, numbness or weakness of the extremities. Nursing Notes were reviewed. REVIEW OF SYSTEMS    (2+ forlevel 4; 10+ for level 5)     Review of Systems  See hpi for further details. Complete 10 point review of all systems performed and is otherwise negative unless noted above.     PAST MEDICAL HISTORY     Past Medical History:   Diagnosis Date    Acid reflux     Broken tooth        SURGICAL HISTORY       Past Surgical History:   Procedure Laterality Date    CHOLECYSTECTOMY      SLEEVE GASTRECTOMY N/A 9/15/2020    ROBOTIC ASSISTED LAPAROSCOPIC VERTICAL SLEEVE GASTRECTOMY, HIATAL HERNIA REPAIR performed by Donna Javier DO at [facility] N/A 6/8/2020    EGD BIOPSY performed by Donna Javier DO at [address]       Discharge Medication List as of 10/8/2020 11:44 PM      CONTINUE these medications which have NOT CHANGED    Details   Multiple Vitamins-Minerals (BARIATRIC FUSION PO) Take by mouthHistorical Med      ondansetron (ZOFRAN) 4 MG tablet Take 1 tablet by mouth every 6 hours as needed for Nausea or Vomiting, Disp-30 tablet,R-0Print      Hyoscyamine Sulfate SL (LEVSIN/SL) 0.125 MG SUBL Place 1 tablet under the tongue every 6 hours as needed (spasm), Disp-30 each,R-0Print      omeprazole (PRILOSEC) 20 MG delayed release capsule Take 1 capsule by mouth Daily, Disp-30 capsule,R-3Print      levonorgestrel (MIRENA) IUD 52 mg 1 each by Intrauterine route once, Intrauterine, ONCE, Historical Med             ALLERGIES     Patient has no known allergies.     FAMILY HISTORY       Family History   Problem Relation Age of Onset    Other Mother         hyperactive thyroid    Other Maternal Aunt         hyperactive thyroid    Heart Disease Maternal Grandmother     Hypertension Maternal Grandmother     Elevated Lipids Maternal Grandmother     Heart Disease Maternal Grandfather     Hypertension Maternal Grandfather     Elevated Lipids Maternal Grandfather     Breast Cancer Paternal Grandmother           SOCIAL HISTORY       Social History     Socioeconomic History    Marital status: Single     Spouse name: None    Number of children: None    Years of education: None    Highest education level: None   Occupational History    None   Social Needs    Financial resource strain: None    Food insecurity     Worry: None     Inability: None    Transportation needs     Medical: None     Non-medical: None   Tobacco Use    Smoking status: Never Smoker    Smokeless tobacco: Never Used   Substance and Sexual Activity    Alcohol use: Yes     Frequency: Never     Comment: 2 x/ yr    Drug use: No    Sexual activity: Yes     Partners: Male   Lifestyle    Physical activity     Days per week: None     Minutes per session: None    Stress: None   Relationships    Social connections     Talks on phone: None     Gets together: None     Attends Yazdanism service: None     Active member of club or organization: None     Attends meetings of clubs or organizations: None     Relationship status: None    Intimate partner violence     Fear of current or ex partner: None     Emotionally abused: None     Physically abused: None     Forced sexual activity: None   Other Topics Concern    None   Social History Narrative    None       SCREENINGS    Steele Coma Scale  Eye Opening: Spontaneous  Best Verbal Response: Oriented  Best Motor Response: Obeys commands  Steele Coma Scale Score: 15      PHYSICAL EXAM    (5+ for level 4, 8+ for level 5)     ED Triage Vitals [10/08/20 2048]   BP Temp Temp Source Pulse Resp SpO2 Height Weight   130/87 98.4 °F (36.9 °C) Oral 87 16 100 % 5' 7\" (1.702 m) 241 lb (109.3 kg)       Physical Exam  Vitals signs and nursing note reviewed. Constitutional:       General: She is not in acute distress. Appearance: Normal appearance. She is not toxic-appearing or diaphoretic. HENT:      Head: Normocephalic and atraumatic. Right Ear: External ear normal.      Left Ear: External ear normal.      Nose: Nose normal.      Mouth/Throat:      Mouth: Mucous membranes are moist.      Pharynx: Oropharynx is clear. Eyes:      General: No scleral icterus. Right eye: No discharge. Left eye: No discharge. Extraocular Movements: Extraocular movements intact. Conjunctiva/sclera: Conjunctivae normal.      Pupils: Pupils are equal, round, and reactive to light. Neck:      Musculoskeletal: Normal range of motion and neck supple. Cardiovascular:      Pulses: Normal pulses. Pulmonary:      Effort: Pulmonary effort is normal. No respiratory distress. Breath sounds: Normal breath sounds. No stridor. Chest:      Chest wall: Tenderness (Left lateral.  No crepitus.) present. Abdominal:      General: Abdomen is flat. There is no distension. Musculoskeletal: Normal range of motion. evidence for edema. No evidence for effusion. No acute osseous abnormality is identified. No acute airspace disease identified. Ct Abdomen Pelvis W Iv Contrast Additional Contrast? None    Result Date: 10/8/2020  EXAMINATION: CTA OF THE CHEST; CT OF THE ABDOMEN AND PELVIS WITH CONTRAST 10/8/2020 9:48 pm TECHNIQUE: CTA of the chest was performed after the administration of intravenous contrast.  Multiplanar reformatted images are provided for review. MIP images are provided for review. Dose modulation, iterative reconstruction, and/or weight based adjustment of the mA/kV was utilized to reduce the radiation dose to as low as reasonably achievable.; CT of the abdomen and pelvis was performed with the administration of intravenous contrast. Multiplanar reformatted images are provided for review. Dose modulation, iterative reconstruction, and/or weight based adjustment of the mA/kV was utilized to reduce the radiation dose to as low as reasonably achievable. COMPARISON: None HISTORY: ORDERING SYSTEM PROVIDED HISTORY: L side cp, post op TECHNOLOGIST PROVIDED HISTORY: Reason for exam:->L side cp, post op Reason for Exam: L sided cp, recent gastric sleeve Acuity: Acute Type of Exam: Initial; ORDERING SYSTEM PROVIDED HISTORY: L sided cp, recent gastric sleeve TECHNOLOGIST PROVIDED HISTORY: Additional Contrast?->None Reason for exam:->L sided cp, recent gastric sleeve Reason for Exam: L sided cp, recent gastric sleeve Acuity: Acute Type of Exam: Initial FINDINGS: Chest: Mediastinum: Pulmonary arteries are adequately opacified. The main pulmonary artery is normal in caliber. No pulmonary embolus identified. Streak artifact related to contrast within the SVC limits evaluation of segmental branches in the right upper lobe. Thoracic aorta is normal in caliber. The heart size is within normal limits. No pericardial effusion. No pathologically enlarged adenopathy. The esophagus is unremarkable.  Lungs/pleura: No reduce the radiation dose to as low as reasonably achievable.; CT of the abdomen and pelvis was performed with the administration of intravenous contrast. Multiplanar reformatted images are provided for review. Dose modulation, iterative reconstruction, and/or weight based adjustment of the mA/kV was utilized to reduce the radiation dose to as low as reasonably achievable. COMPARISON: None HISTORY: ORDERING SYSTEM PROVIDED HISTORY: L side cp, post op TECHNOLOGIST PROVIDED HISTORY: Reason for exam:->L side cp, post op Reason for Exam: L sided cp, recent gastric sleeve Acuity: Acute Type of Exam: Initial; ORDERING SYSTEM PROVIDED HISTORY: L sided cp, recent gastric sleeve TECHNOLOGIST PROVIDED HISTORY: Additional Contrast?->None Reason for exam:->L sided cp, recent gastric sleeve Reason for Exam: L sided cp, recent gastric sleeve Acuity: Acute Type of Exam: Initial FINDINGS: Chest: Mediastinum: Pulmonary arteries are adequately opacified. The main pulmonary artery is normal in caliber. No pulmonary embolus identified. Streak artifact related to contrast within the SVC limits evaluation of segmental branches in the right upper lobe. Thoracic aorta is normal in caliber. The heart size is within normal limits. No pericardial effusion. No pathologically enlarged adenopathy. The esophagus is unremarkable. Lungs/pleura: No focal consolidation, pleural effusion or pneumothorax. The central airways are patent. Soft Tissues/Bones: No acute findings in the bones or soft tissues. Mild degenerative changes in the thoracic spine. Abdomen/Pelvis: Organs: Status post cholecystectomy. The liver, spleen, pancreas and adrenal glands are without focal abnormality. No biliary duct dilation. The kidneys enhance symmetrically. Excreted contrast material within the renal collecting system limits evaluation for underlying stones. No hydronephrosis or perinephric stranding. No ureteral calculus.  GI/Bowel: Status post gastric sleeve. No dilated loops of bowel or bowel wall thickening. No free air. Appendix is normal.  Scattered colonic diverticula without acute diverticulitis. Pelvis: IUD in place. No pathologically enlarged adenopathy or significant free fluid. The uterus and ovaries are otherwise unremarkable. No bladder wall thickening. Peritoneum/Retroperitoneum: The aorta is normal in caliber. The celiac axis, SMA and RENETTA are patent. The portal venous system is patent. No pathologically enlarged adenopathy. No ascites or drainable fluid collection. Mild stranding in the right upper quadrant subcutaneous soft tissues likely related to recent surgery. No acute osseous abnormality. Bones/Soft Tissues: No acute findings in the bones or soft tissues. 1. No acute pulmonary embolus or acute cardiopulmonary findings. 2. Status post gastric sleeve. 3. No acute abdominal or pelvic abnormality. LABS:  Labs Reviewed   COMPREHENSIVE METABOLIC PANEL - Abnormal; Notable for the following components:       Result Value    Potassium 3.1 (*)     Anion Gap 17 (*)     CREATININE <0.5 (*)     Total Bilirubin 1.5 (*)     ALT 69 (*)     AST 40 (*)     All other components within normal limits    Narrative:     Performed at:  [address],  [place], [address] [facility]   Phone [phone]   CBC WITH AUTO DIFFERENTIAL    Narrative:     Performed at:  [address],  [place], [address] [facility]   Phone [phone]   D-DIMER, QUANTITATIVE    Narrative:     Performed at:  [address],  [place], [address] [facility]   Phone [phone]   HCG, SERUM, QUALITATIVE    Narrative:     Performed at:  [address],  [place], [address] [facility]   Phone [phone]       All other labs were within normal range or not returned as of this dictation.     EMERGENCY DEPARTMENT COURSE and DIFFERENTIAL DIAGNOSIS/MDM:   Vitals:    Vitals:    10/08/20 2048 10/08/20 2208 10/08/20 2345   BP: 130/87 120/71 117/85   Pulse: 87 70 69   Resp: 16 16 17   Temp: 98.4 °F (36.9 °C)     TempSrc: Oral     SpO2: 100% 100% 100%   Weight: 241 lb (109.3 kg)     Height: 5' 7\" (1.702 m)         Medications   metoclopramide (REGLAN) injection 10 mg (10 mg Intravenous Given 10/8/20 2207)   diphenhydrAMINE (BENADRYL) injection 25 mg (25 mg Intravenous Given 10/8/20 2207)   dexamethasone (DECADRON) injection 10 mg (10 mg Intravenous Given 10/8/20 2207)   0.9 % sodium chloride bolus (0 mLs Intravenous Stopped 10/8/20 2300)   iopamidol (ISOVUE-370) 76 % injection 75 mL (75 mLs Intravenous Given 10/8/20 2200)   butalbital-acetaminophen-caffeine (FIORICET, ESGIC) per tablet 2 tablet (2 tablets Oral Given 10/8/20 2300)   potassium bicarb-citric acid (EFFER-K) effervescent tablet 40 mEq (40 mEq Oral Given 10/8/20 2337)       MDM. CT head not currently indicated patient reports a history of prior migraines which is similar. CT chest ordered to rule out PE or abnormality with the esophagus. Also ordered CT abdomen pelvis to continue down into the abdomen given her recent gastric sleeve. Delta troponin not indicated as symptom onset is greater than 6 hours. Patient with low risk heart score. Note the patient does have some liver enzyme elevation which she instructed to follow-up with primary care. Work-up is otherwise unremarkable. Still has a headache so Fioricet was given with improvement. Her potassium is low-this will be repleted. She will be discharged home with the below medications and strict return precautions. HEART Score ? 3 and 1 negative troponin - No hospitalization indicated    I completed a HEART Score to screen for Major Adverse Cardiac Event (MACE) in this patient. The evidence indicates that the patient is very low risk for MACE and this is consistent with my clinical intuition.       The risk of further workup or hospitalization for MACE is likely higher than the risk of the patient having a MACE. It is, therefore, in the patients best interest not to do additional emergent testing or to be hospitalized for MACE. Patient instructed to follow up with their primary care doctor in one-two days and return to the emergency department if worsening of the condition or any other concerns. Please see discharge instructions for further delineation regarding the specific discharge instructions explained and given to the patient. CONSULTS:  None    PROCEDURES:  Unless otherwise noted below, none     Procedures    FINAL IMPRESSION      1. Chest pain, unspecified type    2. Acute nonintractable headache, unspecified headache type    3. Hypokalemia    4. Transaminitis          DISPOSITION/PLAN   DISPOSITION        PATIENT REFERRED TO:  LAURENCE Odonnell  [address] [facility]  [address] [facility] [address] [facility] [address]  [phone]    Schedule an appointment as soon as possible for a visit       [facility]  ED  [address] [facility] [address] [facility]    If symptoms worsen    Tiffanie Dasilvams, DO  [address]  [address] [facility] [address] [place] [address]    Schedule an appointment as soon as possible for a visit         DISCHARGE MEDICATIONS:  Discharge Medication List as of 10/8/2020 11:44 PM      START taking these medications    Details   butalbital-APAP-caffeine (FIORICET) -40 MG CAPS per capsule Take 1-2 capsules by mouth every 8 hours as needed for Headaches, Disp-12 capsule,R-0Print                (Please note:  Portions of this note were completed with a voice recognition program. Efforts were made to edit the dictations but occasionally words and phrases are mis-transcribed.)    Form v2016. J.5-cn    Justina Pierce DO (electronically signed)  Emergency Medicine Provider             Carmela Gee DO  10/09/20 [address] [facility]   10/09/20 7060

## 2020-10-09 NOTE — ED NOTES
--Patient provided with discharge instructions. --Instructions, and follow-up appointments reviewed with patient/family. No further questions or needs at this time. --Vital signs and patient stable upon discharge. --Patient ambulatory to [facility].          Leia Valencia RN  10/08/20 [id number]

## 2020-10-09 NOTE — TELEPHONE ENCOUNTER
Please see mychart messages   Pt was in ED last night for HA and chest pain. Having issues with shakiness and weakness this AM.    Routing to Dr Sissy Rodriguez and RD for Diet  Pt had water and protein shakes yesterday along with her vitamins but was unable to eat anything.

## 2020-10-09 NOTE — TELEPHONE ENCOUNTER
Spoke with Dr Mary Plascencia and he would like pt to utilize Joshuan El that she has to help. Possibility of esophageal spasms.    Pt voiced understanding

## 2020-10-09 NOTE — CARE COORDINATION
Ambulatory Care Manager contacted the patient by telephone to perform post discharge assessment. Verified name and  with patient as identifiers. Patient contacted regarding recent discharge and COVID-19 risk. Discussed COVID-19 related testing which was not done at this time. Patient has following risk factors of: recent surgery, obesity. ACM reviewed discharge instructions, medical action plan and red flags related to discharge diagnosis. Reviewed and educated them on any new and changed medications related to discharge diagnosis. Patient agrees to f/u with her PCP regarding new medication and ED f/u; she reports she has contacted their office and waiting to hear back from them. Encouraged patient to call back if she does not hear back from them by 3:00 pm today. Patient was seen in ED yesterday for chest pain and headache  Patient reports the RD has contacted her. She further reports she gave her instructions on fluid intake and how to increase potassium intake. Patient reports headache resolving and she is still having chest pain but it is now intermittent and less intense. This ACM assisted patient with f/u to Dr. Cyril Zaragoza office; merged call with patient and Dr. Cyril Zaragoza nurse Judson Tarango). Patient described symptoms and Shruthi Ibarra agrees to notify Dr. Nicole Baez and f/u with patient. Education provided regarding infection prevention, and signs and symptoms of COVID-19 and when to seek medical attention with patient who verbalized understanding. Discussed exposure protocols and quarantine from [address] you at higher risk for severe illness  and given an opportunity for questions and concerns. The patient agrees to contact the COVID-19 hotline [phone] or PCP office for questions related to their healthcare. ACM provided contact information for future reference.   Encouraged patient to view the www.cdc.gov web site to re-enforce information reviewed and for COVID-19 updates  Reviewed benefits of My Chart and encouraged patient to review additional information on Advance Directives. Also informed patient the health care power of  and living will documents can be downloaded and printed so they can be completed in front of a notary  Discussed [employer] benefits including 24/7 Nurse/Doctor line; provided contact numbers and web site information. From CDC: Are you at higher risk for severe illness?  Wash your hands often.  Avoid close contact (6 feet, which is about two arm lengths) with people who are sick.  Put distance between yourself and other people if COVID-19 is spreading in your community.  Clean and disinfect frequently touched surfaces.  Avoid all cruise travel and non-essential air travel.  Call your healthcare professional if you have concerns about COVID-19 and your underlying condition or if you are sick. For more information on steps you can take to protect yourself, see CDC's How to Protect Yourself    Pt will be further monitored by COVID Loop Team based on risk factors.

## 2020-11-02 ENCOUNTER — OFFICE VISIT (OUTPATIENT)
Dept: BARIATRICS/WEIGHT MGMT | Age: 30
End: 2020-11-02

## 2020-11-02 VITALS
HEIGHT: 67 IN | SYSTOLIC BLOOD PRESSURE: 116 MMHG | HEART RATE: 75 BPM | BODY MASS INDEX: 37.26 KG/M2 | DIASTOLIC BLOOD PRESSURE: 79 MMHG | TEMPERATURE: 97.3 F | WEIGHT: 237.4 LBS

## 2020-11-02 PROCEDURE — 99024 POSTOP FOLLOW-UP VISIT: CPT | Performed by: SURGERY

## 2020-11-02 NOTE — PROGRESS NOTES
Dietary Assessment Note      Vitals:   Vitals:    11/02/20 1020   BP: 116/79   Pulse: 75   Temp: 97.3 °F (36.3 °C)   Weight: 237 lb 6.4 oz (107.7 kg)   Height: 5' 7\" (1.702 m)    Patient lost 10.4 lbs over 1month. Total Weight Loss: 70.4 lbs    Labs reviewed: labs are reviewed, up to date and normal, labs reviewed, I note that   Results for Raquel Tovar (MRN <[id number]>) as of 11/2/2020 10:37   Ref. Range 10/8/2020 21:08   Potassium Latest Ref Range: 3.5 - 5.1 mmol/L 3.1 (L)     Results for Raquel Tovar (MRN <[id number]>) as of 11/2/2020 10:37   Ref. Range 10/8/2020 21:08   ALT Latest Ref Range: 10 - 40 U/L 69 (H)   AST Latest Ref Range: 15 - 37 U/L 40 (H)   Bilirubin Latest Ref Range: 0.0 - 1.0 mg/dL 1.5 (H)     Protein intake: 60-80 grams/day     Fluid intake: 48-64 oz/day    Multivitamin/mineral intake: Yes, 4 Fusion    Calcium intake: no    Other: none    Exercise: walking    Nutrition Assessment: 6wk s/p gastric sleeve post-op visit.    B - Nectar shake with water  S - sometimes - egg   L - tuna with few crackers  D - boiled chix with mushy carrots / green beans  S - Nectar shake with water  S - SF pudding / jello    Fluids - water, Gatorade Zero    Amount able to eat per sitting: 3-4oz    Following 30/30/30 rule: Yes    Food Intolerances/issues: none    Client Concerns: none    Goals:  - Continue with current eating pattern, focusing on protein foods first  - Continue to track protein and fluid intake to goal  - Phase 4 provided and reviewed    Plan: F/U at 12wks and per Provider    [address]

## 2020-11-02 NOTE — PROGRESS NOTES
[facility]) Physicians   General & Laparoscopic Surgery  [employer]       HPI:     Leann Chandra is a very pleasant 35 y.o. obese female , Body mass index is 37.18 kg/m². Tyler Salter And multiple medical problems who is presenting for bariatric follow up care. Leann Chandra is s/p laparoscopic sleeve gastrectomy by me   Comes today to the clinic without any complaints. Patient denies any nausea, vomiting, fevers, chills, shortness of breath, chest pain, constipation or urinary symptoms. Denies any heartburn nor dysphagia. Patient is feeling very well, and is very active. Patient is very pleased with the weight loss and resolution of co-morbid conditions. Past Medical History:   Diagnosis Date    Acid reflux     Broken tooth      Past Surgical History:   Procedure Laterality Date    CHOLECYSTECTOMY      SLEEVE GASTRECTOMY N/A 9/15/2020    ROBOTIC ASSISTED LAPAROSCOPIC VERTICAL SLEEVE GASTRECTOMY, HIATAL HERNIA REPAIR performed by Clarice Holman DO at [facility] ENDOSCOPY N/A 6/8/2020    EGD BIOPSY performed by Clarice Holman DO at [facility] ENDOSCOPY     Family History   Problem Relation Age of Onset    Other Mother         hyperactive thyroid    Other Maternal Aunt         hyperactive thyroid    Heart Disease Maternal Grandmother     Hypertension Maternal Grandmother     Elevated Lipids Maternal Grandmother     Heart Disease Maternal Grandfather     Hypertension Maternal Grandfather     Elevated Lipids Maternal Grandfather     Breast Cancer Paternal Grandmother      Social History     Tobacco Use    Smoking status: Never Smoker    Smokeless tobacco: Never Used   Substance Use Topics    Alcohol use: Yes     Frequency: Never     Comment: 2 x/ yr     I counseled the patient on the importance of not smoking and risks of ETOH.    No Known Allergies  Vitals:    11/02/20 1020   BP: 116/79   Pulse: 75   Temp: 97.3 °F (36.3 °C)   Weight: 237 lb 6.4 oz (107.7 kg) Height: 5' 7\" (1.702 m)       Body mass index is 37.18 kg/m². Current Outpatient Medications:     butalbital-APAP-caffeine (FIORICET) -40 MG CAPS per capsule, Take 1-2 capsules by mouth every 8 hours as needed for Headaches, Disp: 12 capsule, Rfl: 0    Multiple Vitamins-Minerals (BARIATRIC FUSION PO), Take by mouth, Disp: , Rfl:     Hyoscyamine Sulfate SL (LEVSIN/SL) 0.125 MG SUBL, Place 1 tablet under the tongue every 6 hours as needed (spasm), Disp: 30 each, Rfl: 0    omeprazole (PRILOSEC) 20 MG delayed release capsule, Take 1 capsule by mouth Daily, Disp: 30 capsule, Rfl: 3    levonorgestrel (MIRENA) IUD 52 mg, 1 each by Intrauterine route once, Disp: , Rfl:     ondansetron (ZOFRAN) 4 MG tablet, Take 1 tablet by mouth every 6 hours as needed for Nausea or Vomiting, Disp: 30 tablet, Rfl: 0      Review of Systems - History obtained from the patient  General ROS: negative  Psychological ROS: negative  Hematological and Lymphatic ROS: negative  Endocrine ROS: negative  Respiratory ROS: negative  Cardiovascular ROS: negative  Gastrointestinal ROS:negative  Genito-Urinary ROS: negative  Musculoskeletal ROS: negative   Skin ROS: negative    Physical Exam   Vitals Reviewed   Constitutional: Patient is oriented to person, place, and time. Patient appears well-developed and well-nourished. Patient is active and cooperative. Non-toxic appearance. No distress. Neck: Trachea normal and normal range of motion. No JVD present. Pulmonary/Chest: Effort normal. No accessory muscle usage or stridor. No apnea. No respiratory distress. Cardiovascular: Normal rate and no JVD. Abdominal: Normal appearance. Patient exhibits no distension. Abdomen is soft, obese, non tender. Musculoskeletal: Normal range of motion. Patient exhibits no edema. Neurological: Patient is alert and oriented to person, place, and time. Patient has normal strength. GCS eye subscore is 4. GCS verbal subscore is 5.  GCS motor subscore is 6. Skin: Skin is warm and dry. No abrasion and no rash noted. Patient is not diaphoretic. No cyanosis or erythema. Psychiatric: Patient has a normal mood and affect. Speech is normal and behavior is normal. Cognition and memory are normal.         A/P     Nerissa Vigil is 35 y.o. female , now with Body mass index is 37.18 kg/m². s/p Sleeve gastrectomy, has lost 10 lbs since last visit, total of 70 lbs weight loss. The patient underwent dietary counseling with registered dietician. I have reviewed, discussed and agree with the dietary plan. Patient is trying hard to keep good dietary and behavior modifications. Patient is monitoring portion sizes, food choices and liquid calories. Patient is trying to exercise regularly. Patient pleased with the surgery outcomes. We discussed how her weight affects her overall health including:  David Chavez was seen today for follow-up. Diagnoses and all orders for this visit:    Status post laparoscopic sleeve gastrectomy       and importance of weight loss to alleviate those co morbid conditions. I encouraged the patient to continue exercise and keeping healthy eating habits. Also counseled the patient extensively on post surgery care. I spent 5 minutes face to face with patient with more than 50% of the time counseling and/or coordinating care for post-bariatric surgical care. RTC in 2 months  Diet and Exercise      Patient advised that its their responsibility to follow up for studies and/or labs ordered today.

## 2020-12-21 NOTE — TELEPHONE ENCOUNTER
"\"Loot-za\" is here for a viability US due to her MAGDI dx and blood sugar mgt needs.  8 4/7 weeks based on US today with FHR of 168.  No nausea.  Feels well.  Good blood sugar control with Endocrine Yuri Hansen MD and consult reviewed.    Denia is working from home so that helps her manage her meals.  ASSESSMENT: Early pregnancy with hx of SAB, IDDM mgt for MAGDI, AMA, Hashimoto thyroid.   PLAN: RTC in 1 wks for NOB with Dr South.  NOB labs today.   JE     " Spoke with ER nurse as she was on a f/u call with pt  States that pt is still having L sided chest pain. States that pain will come and go, and feels like something is squeezing on the inside of her chest.     Asking if there is anything that you can recommend for this?

## 2020-12-29 ENCOUNTER — VIRTUAL VISIT (OUTPATIENT)
Dept: FAMILY MEDICINE CLINIC | Age: 30
End: 2020-12-29
Payer: COMMERCIAL

## 2020-12-29 ENCOUNTER — TELEPHONE (OUTPATIENT)
Dept: FAMILY MEDICINE CLINIC | Age: 30
End: 2020-12-29

## 2020-12-29 PROCEDURE — 99213 OFFICE O/P EST LOW 20 MIN: CPT | Performed by: PHYSICIAN ASSISTANT

## 2020-12-29 PROCEDURE — 87804 INFLUENZA ASSAY W/OPTIC: CPT | Performed by: PHYSICIAN ASSISTANT

## 2020-12-29 PROCEDURE — G8427 DOCREV CUR MEDS BY ELIG CLIN: HCPCS | Performed by: PHYSICIAN ASSISTANT

## 2020-12-29 ASSESSMENT — ENCOUNTER SYMPTOMS
CHEST TIGHTNESS: 0
EYE DISCHARGE: 0
VOMITING: 0
COUGH: 1
SINUS PAIN: 0
RHINORRHEA: 0
SINUS PRESSURE: 1
NAUSEA: 0
SHORTNESS OF BREATH: 0
WHEEZING: 0

## 2020-12-29 NOTE — PROGRESS NOTES
2020    TELEHEALTH EVALUATION -- Audio/Visual (During DXPDE-46 public health emergency)    HPI:    Lesta Duane (:  1990) has requested an audio/video evaluation for the following concern(s):    The pt is here for evaluation of fever and chills  symptoms started 24 hours ago and have been unchanged  Her fever has been as high as 102.3 F but is responding to tylenol  She has chills and sweats when the fever returns. There is some associated sinus pressure, mild dry cough, and headache. She denies any nausea, vomiting, shortness of breath, loss of taste or smell  Taking tylenol day and pm without improvement  No known sick contacts but she does work in the public    Review of Systems   Constitutional: Positive for chills and fever. HENT: Positive for sinus pressure. Negative for congestion, ear pain, postnasal drip, rhinorrhea and sinus pain. Scratchy throat   Eyes: Negative for discharge and visual disturbance. Respiratory: Positive for cough. Negative for chest tightness, shortness of breath and wheezing. Gastrointestinal: Negative for nausea and vomiting. Neurological: Positive for headaches. Hematological: Negative for adenopathy. Prior to Visit Medications    Medication Sig Taking?  Authorizing Provider   butalbital-APAP-caffeine (FIORICET) -40 MG CAPS per capsule Take 1-2 capsules by mouth every 8 hours as needed for Headaches Yes Bryanna Kelley, DO   Multiple Vitamins-Minerals (BARIATRIC FUSION PO) Take by mouth Yes Historical Provider, MD   Hyoscyamine Sulfate SL (LEVSIN/SL) 0.125 MG SUBL Place 1 tablet under the tongue every 6 hours as needed (spasm) Yes PRISCILA Rodriguez CNP   omeprazole (PRILOSEC) 20 MG delayed release capsule Take 1 capsule by mouth Daily Yes PRISCILA Rodriguez CNP   levonorgestrel (MIRENA) IUD 52 mg 1 each by Intrauterine route once Yes Historical Provider, MD       Social History     Tobacco Use    Smoking status: Never Smoker Neurological:        [x] No Facial Asymmetry (Cranial nerve 7 motor function) (limited exam to video visit)          [] No gaze palsy        [] Abnormal-         Skin:        [] No significant exanthematous lesions or discoloration noted on facial skin         [] Abnormal-            Psychiatric:       [] Normal Affect [] No Hallucinations        [] Abnormal-     Other pertinent observable physical exam findings-     ASSESSMENT/PLAN:  1. Flu-like symptoms  -  Pt was given the phone number for the [facility] to schedule a covid and flu test. She is aware that she will need to quarantine until she has the results of the COVID test. Encouraged increased fluids, tylenol, ibuprofen, lukewarm showers for symptomatic relief. If her symptoms worsen she will call for further recommendations  - POCT Influenza A/B Antigen (BD Veritor)      No follow-ups on file. Kylee De Leon is a 35 y.o. female being evaluated by a Virtual Visit (video visit) encounter to address concerns as mentioned above. A caregiver was present when appropriate. Due to this being a TeleHealth encounter (During [facility]), evaluation of the following organ systems was limited: Vitals/Constitutional/EENT/Resp/CV/GI//MS/Neuro/Skin/Heme-Lymph-Imm. Pursuant to the emergency declaration under the [address] [place] [address] authority and the [employer] and [employer] Act, this Virtual Visit was conducted with patient's (and/or legal guardian's) consent, to reduce the patient's risk of exposure to COVID-19 and provide necessary medical care. The patient (and/or legal guardian) has also been advised to contact this office for worsening conditions or problems, and seek emergency medical treatment and/or call 911 if deemed necessary.      Patient identification was verified at the start of the visit: Yes Total time spent on this encounter: Not billed by time    Services were provided through a video synchronous discussion virtually to substitute for in-person clinic visit. Patient and provider were located at their individual homes. --LAURENCE Rodriguez on 12/29/2020 at 1:59 PM    An electronic signature was used to authenticate this note.

## 2020-12-30 ENCOUNTER — OFFICE VISIT (OUTPATIENT)
Dept: PRIMARY CARE CLINIC | Age: 30
End: 2020-12-30
Payer: COMMERCIAL

## 2020-12-30 DIAGNOSIS — Z11.59 SCREENING FOR VIRAL DISEASE: Primary | ICD-10-CM

## 2020-12-30 PROCEDURE — 99211 OFF/OP EST MAY X REQ PHY/QHP: CPT | Performed by: NURSE PRACTITIONER

## 2020-12-30 PROCEDURE — G8417 CALC BMI ABV UP PARAM F/U: HCPCS | Performed by: NURSE PRACTITIONER

## 2020-12-30 PROCEDURE — G8428 CUR MEDS NOT DOCUMENT: HCPCS | Performed by: NURSE PRACTITIONER

## 2020-12-30 NOTE — PATIENT INSTRUCTIONS

## 2020-12-30 NOTE — PROGRESS NOTES
Kaitlyn Sellers received a viral test for COVID-19. They were educated on isolation and quarantine as appropriate. For any symptoms, they were directed to seek care from their PCP, given contact information to establish with a doctor, directed to an urgent care or the emergency room.

## 2020-12-31 LAB — SARS-COV-2, NAA: DETECTED

## 2021-01-29 ENCOUNTER — OFFICE VISIT (OUTPATIENT)
Dept: FAMILY MEDICINE CLINIC | Age: 31
End: 2021-01-29
Payer: COMMERCIAL

## 2021-01-29 VITALS
OXYGEN SATURATION: 100 % | HEART RATE: 77 BPM | SYSTOLIC BLOOD PRESSURE: 126 MMHG | TEMPERATURE: 97.2 F | WEIGHT: 224 LBS | BODY MASS INDEX: 35.08 KG/M2 | DIASTOLIC BLOOD PRESSURE: 72 MMHG

## 2021-01-29 DIAGNOSIS — R30.0 DYSURIA: Primary | ICD-10-CM

## 2021-01-29 LAB
BILIRUBIN, POC: NORMAL
BLOOD URINE, POC: NORMAL
CLARITY, POC: CLEAR
COLOR, POC: YELLOW
CONTROL: NORMAL
GLUCOSE URINE, POC: NORMAL
KETONES, POC: NORMAL
LEUKOCYTE EST, POC: NORMAL
NITRITE, POC: NORMAL
PH, POC: 5.5
PREGNANCY TEST URINE, POC: NORMAL
PROTEIN, POC: NORMAL
SPECIFIC GRAVITY, POC: 1.01
UROBILINOGEN, POC: 0.2

## 2021-01-29 PROCEDURE — G8427 DOCREV CUR MEDS BY ELIG CLIN: HCPCS | Performed by: FAMILY MEDICINE

## 2021-01-29 PROCEDURE — G8417 CALC BMI ABV UP PARAM F/U: HCPCS | Performed by: FAMILY MEDICINE

## 2021-01-29 PROCEDURE — 1036F TOBACCO NON-USER: CPT | Performed by: FAMILY MEDICINE

## 2021-01-29 PROCEDURE — 81025 URINE PREGNANCY TEST: CPT | Performed by: FAMILY MEDICINE

## 2021-01-29 PROCEDURE — G8484 FLU IMMUNIZE NO ADMIN: HCPCS | Performed by: FAMILY MEDICINE

## 2021-01-29 PROCEDURE — 99213 OFFICE O/P EST LOW 20 MIN: CPT | Performed by: FAMILY MEDICINE

## 2021-01-29 PROCEDURE — 81002 URINALYSIS NONAUTO W/O SCOPE: CPT | Performed by: FAMILY MEDICINE

## 2021-01-29 RX ORDER — CIPROFLOXACIN 500 MG/1
500 TABLET, FILM COATED ORAL 2 TIMES DAILY
Qty: 14 TABLET | Refills: 0 | Status: SHIPPED | OUTPATIENT
Start: 2021-01-29 | End: 2021-02-05

## 2021-01-29 ASSESSMENT — ENCOUNTER SYMPTOMS
VOMITING: 0
NAUSEA: 0

## 2021-01-29 NOTE — PROGRESS NOTES
 Hyoscyamine Sulfate SL (LEVSIN/SL) 0.125 MG SUBL Place 1 tablet under the tongue every 6 hours as needed (spasm) (Patient not taking: Reported on 1/29/2021) 30 each 0    omeprazole (PRILOSEC) 20 MG delayed release capsule Take 1 capsule by mouth Daily (Patient not taking: Reported on 1/29/2021) 30 capsule 3     No current facility-administered medications for this visit. Assessment:    1. Dysuria        Plan:    1. Dysuria  Increase water intake. Take Cipro. - Culture, Urine  - POCT Urinalysis no Micro  - POCT urine pregnancy  - ciprofloxacin (CIPRO) 500 MG tablet; Take 1 tablet by mouth 2 times daily for 7 days  Dispense: 14 tablet; Refill: 0      Patient to return to clinic if symptoms worsen or fail to improve.

## 2021-02-01 LAB
ORGANISM: ABNORMAL
URINE CULTURE, ROUTINE: ABNORMAL

## 2021-02-05 ENCOUNTER — OFFICE VISIT (OUTPATIENT)
Dept: PRIMARY CARE CLINIC | Age: 31
End: 2021-02-05
Payer: COMMERCIAL

## 2021-02-05 DIAGNOSIS — Z11.59 SCREENING FOR VIRAL DISEASE: Primary | ICD-10-CM

## 2021-02-05 PROCEDURE — G8417 CALC BMI ABV UP PARAM F/U: HCPCS | Performed by: NURSE PRACTITIONER

## 2021-02-05 PROCEDURE — G8428 CUR MEDS NOT DOCUMENT: HCPCS | Performed by: NURSE PRACTITIONER

## 2021-02-05 PROCEDURE — 99211 OFF/OP EST MAY X REQ PHY/QHP: CPT | Performed by: NURSE PRACTITIONER

## 2021-02-05 NOTE — PROGRESS NOTES
Marisel Lakhani received a viral test for COVID-19. They were educated on isolation and quarantine as appropriate. For any symptoms, they were directed to seek care from their PCP, given contact information to establish with a doctor, directed to an urgent care or the emergency room.

## 2021-02-05 NOTE — PATIENT INSTRUCTIONS

## 2021-02-08 LAB — SARS-COV-2, NAA: NOT DETECTED

## 2021-03-17 LAB — PAP SMEAR, EXTERNAL: NEGATIVE

## 2021-04-16 ENCOUNTER — VIRTUAL VISIT (OUTPATIENT)
Dept: FAMILY MEDICINE CLINIC | Age: 31
End: 2021-04-16
Payer: COMMERCIAL

## 2021-04-16 DIAGNOSIS — M54.41 ACUTE BILATERAL LOW BACK PAIN WITH BILATERAL SCIATICA: Primary | ICD-10-CM

## 2021-04-16 DIAGNOSIS — M54.42 ACUTE BILATERAL LOW BACK PAIN WITH BILATERAL SCIATICA: Primary | ICD-10-CM

## 2021-04-16 PROCEDURE — G8427 DOCREV CUR MEDS BY ELIG CLIN: HCPCS | Performed by: PHYSICIAN ASSISTANT

## 2021-04-16 PROCEDURE — 99213 OFFICE O/P EST LOW 20 MIN: CPT | Performed by: PHYSICIAN ASSISTANT

## 2021-04-16 RX ORDER — METHYLPREDNISOLONE 4 MG/1
TABLET ORAL
Qty: 1 KIT | Refills: 0 | Status: SHIPPED | OUTPATIENT
Start: 2021-04-16 | End: 2021-06-01 | Stop reason: ALTCHOICE

## 2021-04-16 RX ORDER — OXYCODONE HYDROCHLORIDE AND ACETAMINOPHEN 5; 325 MG/1; MG/1
1 TABLET ORAL EVERY 6 HOURS PRN
Qty: 20 TABLET | Refills: 0 | Status: SHIPPED | OUTPATIENT
Start: 2021-04-16 | End: 2021-04-21

## 2021-04-16 ASSESSMENT — ENCOUNTER SYMPTOMS
BACK PAIN: 1
ABDOMINAL PAIN: 0

## 2021-04-16 NOTE — PROGRESS NOTES
2021    TELEHEALTH EVALUATION -- Audio/Visual (During EPZMI-12 public health emergency)    HPI:    Luis Islas (:  1990) has requested an audio/video evaluation for the following concern(s):    Lower back pain:  Symptoms started 3-4 days ago, getting progressively worse  Across the entire lower lumbar back, radiating down the backs of her legs  Quality: sharp  Associated symptoms: numbness inside her thighs, weakness once after she was working for several hours  Denies: urinary incontinence, falls, swelling or color change    Review of Systems   Constitutional: Negative for fever. Cardiovascular: Negative for chest pain. Gastrointestinal: Negative for abdominal pain. Musculoskeletal: Positive for back pain, gait problem and myalgias. Neurological: Positive for weakness and numbness. Prior to Visit Medications    Medication Sig Taking? Authorizing Provider   methylPREDNISolone (MEDROL DOSEPACK) 4 MG tablet Take by mouth. Yes LAURENCE Cartagena   oxyCODONE-acetaminophen (PERCOCET) 5-325 MG per tablet Take 1 tablet by mouth every 6 hours as needed for Pain for up to 5 days. Intended supply: 5 days.  Take lowest dose possible to manage pain Yes LAURENCE Cartagena   Multiple Vitamins-Minerals (BARIATRIC FUSION PO) Take by mouth Yes Historical Provider, MD   levonorgestrel (MIRENA) IUD 52 mg 1 each by Intrauterine route once Yes Historical Provider, MD       Social History     Tobacco Use    Smoking status: Never Smoker    Smokeless tobacco: Never Used   Substance Use Topics    Alcohol use: Yes     Frequency: Never     Comment: 2 x/ yr    Drug use: No        No Known Allergies    PHYSICAL EXAMINATION:  [ INSTRUCTIONS:  \"[x]\" Indicates a positive item  \"[]\" Indicates a negative item  -- DELETE ALL ITEMS NOT EXAMINED]  Vital Signs: (As obtained by patient/caregiver or practitioner observation)    Blood pressure-  Heart rate-    Respiratory rate-14    Temperature-  Pulse oximetry- Constitutional: [x] Appears well-developed and well-nourished [x] No apparent distress      [] Abnormal-   Mental status  [x] Alert and awake  [x] Oriented to person/place/time [x]Able to follow commands      Eyes:  EOM    [x]  Normal  [] Abnormal-  Sclera  []  Normal  [] Abnormal -         Discharge []  None visible  [] Abnormal -    HENT:   [x] Normocephalic, atraumatic. [] Abnormal   [x] Mouth/Throat: Mucous membranes are moist.     External Ears [x] Normal  [] Abnormal-     Neck: [x] No visualized mass     Pulmonary/Chest: [x] Respiratory effort normal.  [x] No visualized signs of difficulty breathing or respiratory distress        [] Abnormal-      Musculoskeletal:   [] Normal gait with no signs of ataxia         [] Normal range of motion of neck        [x] Abnormal- decreased AROM of lumbar spine      Neurological:        [] No Facial Asymmetry (Cranial nerve 7 motor function) (limited exam to video visit)          [] No gaze palsy        [] Abnormal-         Skin:        [] No significant exanthematous lesions or discoloration noted on facial skin         [] Abnormal-            Psychiatric:       [] Normal Affect [] No Hallucinations        [] Abnormal-     Other pertinent observable physical exam findings-     ASSESSMENT/PLAN:  1. Acute bilateral low back pain with bilateral sciatica  -  Pt to rest, letter written for work. Discussed with the pt the worrisome signs and symptoms which would necessitate emergent evaluation over the weekend  - methylPREDNISolone (MEDROL DOSEPACK) 4 MG tablet; Take by mouth. Dispense: 1 kit; Refill: 0  - oxyCODONE-acetaminophen (PERCOCET) 5-325 MG per tablet; Take 1 tablet by mouth every 6 hours as needed for Pain for up to 5 days. Intended supply: 5 days. Take lowest dose possible to manage pain  Dispense: 20 tablet; Refill: 0      Return if symptoms worsen or fail to improve. Henri Faria, was evaluated through a synchronous (real-time) audio-video encounter.  The patient (or guardian if applicable) is aware that this is a billable service. Verbal consent to proceed has been obtained within the past 12 months. The visit was conducted pursuant to the emergency declaration under the [address] [place] [address] authority and the [employer] and [employer] Act. Patient identification was verified, and a caregiver was present when appropriate. The patient was located in a state where the provider was credentialed to provide care. Total time spent on this encounter: Not billed by time    --LAURENCE Camarillo on 4/16/2021 at 10:12 AM    An electronic signature was used to authenticate this note.

## 2021-04-20 ENCOUNTER — PATIENT MESSAGE (OUTPATIENT)
Dept: FAMILY MEDICINE CLINIC | Age: 31
End: 2021-04-20

## 2021-04-20 DIAGNOSIS — M54.41 ACUTE BILATERAL LOW BACK PAIN WITH BILATERAL SCIATICA: Primary | ICD-10-CM

## 2021-04-20 DIAGNOSIS — M54.42 ACUTE BILATERAL LOW BACK PAIN WITH BILATERAL SCIATICA: Primary | ICD-10-CM

## 2021-04-20 NOTE — TELEPHONE ENCOUNTER
From: Cherri Beckford  To: LAURENCE Álvarez  Sent: 4/20/2021 10:20 AM EDT  Subject: Visit Follow-Up Question    I just wanted to give you an update on how I'm feeling. So the pain is definitely still there but I'm able to sit and lay down longer than I was before. Standing for long periods of time still hurts. I dont feel as much of the pain in my legs its just all across my lower back still and some of my butt. Sleeping is difficult without pain meds that were prescribed. So I only take one now at night to help manage pain while sleeping. I just try to get through the pain throughout the day. I do get these what feels like shock feelings from time to time. They are really sharp and fast but last for a second and go away.

## 2021-04-23 RX ORDER — TRAMADOL HYDROCHLORIDE 50 MG/1
50 TABLET ORAL EVERY 6 HOURS PRN
Qty: 28 TABLET | Refills: 0 | Status: SHIPPED | OUTPATIENT
Start: 2021-04-23 | End: 2021-04-30

## 2021-05-05 ENCOUNTER — HOSPITAL ENCOUNTER (OUTPATIENT)
Dept: GENERAL RADIOLOGY | Age: 31
Discharge: HOME | End: 2021-05-05
Payer: COMMERCIAL

## 2021-05-05 ENCOUNTER — PATIENT MESSAGE (OUTPATIENT)
Dept: FAMILY MEDICINE CLINIC | Age: 31
End: 2021-05-05

## 2021-05-05 ENCOUNTER — HOSPITAL ENCOUNTER (OUTPATIENT)
Age: 31
Discharge: HOME | End: 2021-05-05
Payer: COMMERCIAL

## 2021-05-05 DIAGNOSIS — M54.41 ACUTE BILATERAL LOW BACK PAIN WITH BILATERAL SCIATICA: ICD-10-CM

## 2021-05-05 DIAGNOSIS — M54.42 ACUTE BILATERAL LOW BACK PAIN WITH BILATERAL SCIATICA: Primary | ICD-10-CM

## 2021-05-05 DIAGNOSIS — M54.42 ACUTE BILATERAL LOW BACK PAIN WITH BILATERAL SCIATICA: ICD-10-CM

## 2021-05-05 DIAGNOSIS — M54.41 ACUTE BILATERAL LOW BACK PAIN WITH BILATERAL SCIATICA: Primary | ICD-10-CM

## 2021-05-05 PROCEDURE — 72100 X-RAY EXAM L-S SPINE 2/3 VWS: CPT

## 2021-05-17 RX ORDER — CYCLOBENZAPRINE HCL 5 MG
5 TABLET ORAL 3 TIMES DAILY PRN
Qty: 30 TABLET | Refills: 0 | Status: SHIPPED | OUTPATIENT
Start: 2021-05-17 | End: 2021-06-01 | Stop reason: SDUPTHER

## 2021-05-17 NOTE — TELEPHONE ENCOUNTER
From: Andrei Galan  Sent: 5/16/2021 12:14 PM EDT  To: [facility]  Subject: RE: X ray results    I would love to do physical therapy I just am unsure of when I can fit it in my schedule. I am again experiencing a lot of pain in my lower back since last night. I am trying to work through it at my work. It definitely is feeling like the muscles in my back are inflammed. The pain isn't going down my legs at this point. But I tried Tylenol and Ibuprofen but neither are working. I also tried a heating pad. This pain is just really hard to work with. I am in the [facility] area I can try to see if they have a scheduled time that would work for me. But until I can get in there is there anything I can take or do for relief of pain. It's making it hard to do normal daily things. ----- Message -----  From: Ata HINES  Sent: 5/5/21 11:58 AM  To: Andrei Galan  Subject: X ray results    [facility]    Please see Kathie message regarding you're x ray results          Please let the pt know that her xray is negative for any degeneration or signs of disc herniation. Most likely this is muscular. The best treatment for the pain is physical therapy. Would she be willing to completed Pt and if so, which location?  Thank you     Let us know    Thanks  Gabriella Metcalf

## 2021-05-20 ENCOUNTER — HOSPITAL ENCOUNTER (OUTPATIENT)
Dept: PHYSICAL THERAPY | Age: 31
Setting detail: THERAPIES SERIES
Discharge: HOME | End: 2021-05-20
Payer: COMMERCIAL

## 2021-05-20 PROCEDURE — 97110 THERAPEUTIC EXERCISES: CPT

## 2021-05-20 PROCEDURE — 97112 NEUROMUSCULAR REEDUCATION: CPT

## 2021-05-20 PROCEDURE — 97161 PT EVAL LOW COMPLEX 20 MIN: CPT

## 2021-05-20 NOTE — PLAN OF CARE
[address] [facility], [address], [place] [address] [facility], [address] [facility] [address]  Phone: [phone]   Fax:     [phone]     Physical Therapy Certification    Dear Referring Practitioner: LAURENCE Jensen,    We had the pleasure of evaluating the following patient for physical therapy services at [address] [place] [address]. A summary of our findings can be found in the initial assessment below. This includes our plan of care. If you have any questions or concerns regarding these findings, please do not hesitate to contact me at the office phone number checked above. Thank you for the referral.       Physician Signature:_______________________________Date:__________________  By signing above (or electronic signature), therapists plan is approved by physician      Patient: Andres Leal   : 1990   MRN: [id number]  Referring Physician: Referring Practitioner: LAURENCE Jensen      Evaluation Date: 2021      Medical Diagnosis Information:  Diagnosis: Acute bilateral low back pain with bilateral sciatica M54.42, M54.41   Treatment Diagnosis: LBP M54.5                                         Insurance information: PT Insurance Information: [employer]     Precautions/ Contra-indications: none noted      C-SSRS Triggered by Intake questionnaire (Past 2 wk assessment):   [x] No, Questionnaire did not trigger screening.   [] Yes, Patient intake triggered further evaluation      [] C-SSRS Screening completed  [] PCP notified via Plan of Care  [] Emergency services notified     Latex Allergy:  [x]NO      []YES  Preferred Language for Healthcare:   [x]English       []other:    SUBJECTIVE: Patient stated complaint: Pt reports insidious onset of LBP starting 1 months ago gradual increase in symptoms. Stated has long hx of back pain, lost 95 lbs which has helped some not pain is middle of back.  Pain radiates across LB and down back of hips. Stated usually equally today it is left side. XR neg for degenerative changes. DId PT 3 years ago after back strain picking something up at work, stated got better at that time. Relevant Medical History:gallbladder removed in 2010, gastric sleeve 2020  Functional Disability Index/G-Codes:    Modified Osewstry 38%    Pain Scale: 4-6/10  Easing factors: rest (sitting used to)  Provocative factors: activity    Type: [x]Constant   [x]Intermittent  []Radiating []Localized []other:     Numbness/Tingling: sometimes in LE's if has full work day    Occupation/School: [employer], stand all day, lifting at times <50 lbs; 5-7 hours    Living Status/Prior Level of Function: Independent with ADLs and IADLs. OBJECTIVE:     ROM  Comments   Lumbar Flex Mid shin p! On way back up    Lumbar Ext 50% p! ROM LEFT RIGHT Comments   Lumbar Side Bend To knee p! On R To knee p! On L    Lumbar Rotation 100% 100%    Hip Flexion      Hip Abd      Hip ER      Hip IR      Hip Extension      Knee Ext      Knee Flex      Hamstring Flex      Piriformis                    Strength LEFT RIGHT Comments   Multifidus      Transverse Ab      Hip Flexors      Hip Abductors      Hip Extensors                     Myotomes Normal Abnormal Comments   Hip flexion (L1-L2) X     Knee extension (L2-L4) X     Dorsiflexion (L4-L5) X     Great Toe Ext (L5) X     Ankle Eversion (S1-S2) X     Ankle PF(S1-S2) X       Dermatomes Normal Abnormal Comments   inguinal area (L1)  X     anterior mid-thigh (L2) X     distal ant thigh/med knee (L3) X     medial lower leg and foot (L4) X     lateral lower leg and foot (L5) X     posterior calf (S1) X     medial calcaneus (S2) X       Neural dynamic tension testing Normal Abnormal Comments   Slump Test  - Degree of knee flexion:       SLR   X P!  B   0-30      30-70      Femoral nerve (L2-4)        Reflexes Normal Abnormal Comments   S1-2 Seated achilles      S1-2 Prone knee bend conditions   []Asthma (J45)  []Coughing   []COPD (J44.9)   Psychological Disorders  [x]Anxiety (F41.9)  []Depression (F32.9)   []Other:   []Other:           Barriers to/and or personal factors that will affect rehab potential:              []Age  []Sex              []Motivation/Lack of Motivation                        []Co-Morbidities              []Cognitive Function, education/learning barriers              []Environmental, home barriers              []profession/work barriers  []past PT/medical experience  [x]other:  Justification: Good understanding of HEP, goals of PT. Falls Risk Assessment (30 days):    [x] Falls Risk assessed and no intervention required.   [] Falls Risk assessed and Patient requires intervention due to being higher risk   TUG score (>12s at risk):     [] Falls education provided, including       G-Codes:       ASSESSMENT:    Functional Impairments:     []Noted lumbar/proximal hip hypomobility   []Noted lumbosacral and/or generalized hypermobility   [x]Decreased Lumbosacral/hip/LE functional ROM   [x]Decreased core/proximal hip strength and neuromuscular control    [x]Decreased LE functional strength    []Abnormal reflexes/sensation/myotomal/dermatomal deficits  []Reduced balance/proprioceptive control    []other:      Functional Activity Limitations (from functional questionnaire and intake)   [x]Reduced ability to tolerate prolonged functional positions   [x]Reduced ability or difficulty with changes of positions or transfers between positions   [x]Reduced ability to maintain good posture and demonstrate good body mechanics with sitting, bending, and lifting   [x]Reduced ability to sleep   [x] Reduced ability or tolerance with driving and/or computer work   [x]Reduced ability to perform lifting, reaching, carrying tasks   [x]Reduced ability to squat   [x]Reduced ability to forward bend   [x]Reduced ability to ambulate prolonged functional periods/distances/surfaces   [x]Reduced ability clinical presentation with:  [x] stable and/or uncomplicated characteristics   [] evolving clinical presentation with changing characteristics  [] unstable and unpredictable characteristics;   [x] Clinical decision making of [x] low, [] moderate, [] high complexity using standardized patient assessment instrument and/or measurable assessment of functional outcome. [x] EVAL (LOW) 38641 (typically 20 minutes face-to-face)  [] EVAL (MOD) 11707 (typically 30 minutes face-to-face)  [] EVAL (HIGH) 23139 (typically 45 minutes face-to-face)  [] RE-EVAL     PLAN: Begin PT focusing on: proximal hip mobilizations, LB mobs, LB core activation, proximal hip activation, and HEP    Frequency/Duration:  1-2 days per week for 8 Weeks:  Interventions:  [x]  Therapeutic exercise including: strength training, ROM, for LE, Glutes and core   [x]  NMR activation and proprioception for glutes , LE and Core   [x]  Manual therapy as indicated for Hip complex, LE and spine to include: Dry Needling/IASTM, STM, PROM, Gr I-IV mobilizations, manipulation. [x]  Modalities as needed that may include: thermal agents, E-stim, Biofeedback, US, iontophoresis as indicated  [x]  Patient education on joint protection, postural re-education, activity modification, progression of HEP. HEP instruction: Refer to [address] [place] [address] access code and exercises on the 1st visit treatment note    GOALS:  Patient stated goal: Pain relief. Therapist goals for Patient:   Short Term Goals: To be achieved in: 2 weeks  1. Independent in HEP and progression per patient tolerance, in order to prevent re-injury. [] Progressing: [] Met: [] Not Met: [] Adjusted  2. Patient will have a decrease in pain to facilitate improvement in movement, function, and ADLs as indicated by Functional Deficits. [] Progressing: [] Met: [] Not Met: [] Adjusted    Long Term Goals: To be achieved in: 8 weeks  1.  Disability index score of 17% or less for the VIANCA to assist with reaching prior level of function. [] Progressing: [] Met: [] Not Met: [] Adjusted  2. Patient will demonstrate increased AROM to WNL, good LS mobility, good hip ROM to allow for proper joint functioning as indicated by patients Functional Deficits. [] Progressing: [] Met: [] Not Met: [] Adjusted  3. Patient will demonstrate an increase in Strength to good proximal hip and core activation to allow for proper functional mobility as indicated by patients Functional Deficits. [] Progressing: [] Met: [] Not Met: [] Adjusted  4. Patient will return to functional activities including standing/walking 20-30 mins I no AD without increased symptoms or restriction. [] Progressing: [] Met: [] Not Met: [] Adjusted  5.  Patient will perform all ADL's I no AD without increased symptoms or restriction (patient specific functional goal)    [] Progressing: [] Met: [] Not Met: [] Adjusted     Electronically signed by:  Jonathan Scruggs, PT

## 2021-05-20 NOTE — FLOWSHEET NOTE
with ex, use of heat/ice- pt stated unerstanding    Manual Intervention ([phone])                                                 NMR re-education (53488)   CUES NEEDED                                                                   Therapeutic Activity (75564)                                          [place] access code:  [email or web address]           Therapeutic Exercise and NMR EXR  [x] (27361) Provided verbal/tactile cueing for activities related to strengthening, flexibility, endurance, ROM  for improvements in proximal hip and core control with self care, mobility, lifting and ambulation.  [] (60954) Provided verbal/tactile cueing for activities related to improving balance, coordination, kinesthetic sense, posture, motor skill, proprioception  to assist with core control in self care, mobility, lifting, and ambulation.      Therapeutic Activities:    [] (44512 or 06504) Provided verbal/tactile cueing for activities related to improving balance, coordination, kinesthetic sense, posture, motor skill, proprioception and motor activation to allow for proper function  with self care and ADLs  [] (19724) Provided training and instruction to the patient for proper core and proximal hip recruitment and positioning with ambulation re-education     Home Exercise Program:    [x] (07257) Reviewed/Progressed HEP activities related to strengthening, flexibility, endurance, ROM of core, proximal hip and LE for functional self-care, mobility, lifting and ambulation   [] (82210) Reviewed/Progressed HEP activities related to improving balance, coordination, kinesthetic sense, posture, motor skill, proprioception of core, proximal hip and LE for self care, mobility, lifting, and ambulation      Manual Treatments:  PROM / STM / Oscillations-Mobs:  G-I, II, III, IV (PA's, Inf., Post.)  [] (15864) Provided manual therapy to mobilize proximal hip and LS spine soft tissue/joints for the purpose of modulating pain, promoting relaxation, increasing ROM, reducing/eliminating soft tissue swelling/inflammation/restriction, improving soft tissue extensibility and allowing for proper ROM for normal function with self care, mobility, lifting and ambulation. Modalities:     [] GAME READY (VASO)- for significant edema, swelling, pain control. Charges:  Timed Code Treatment Minutes: 25   Total Treatment Minutes:  40   BWC:  TE TIME:  NMR TIME:  MANUAL TIME:  UNTIMED MINUTES:  Medicare Total:         [x] EVAL (LOW) 47965 (typically 20 minutes face-to-face)  [] EVAL (MOD) 41133 (typically 30 minutes face-to-face)  [] EVAL (HIGH) 21920 (typically 45 minutes face-to-face)  [] RE-EVAL     [x] VG(50051) x     [] IONTO  [x] NMR (88623) x     [] VASO  [] Manual (30541) x     [] Other:  [] TA x      [] Mech Traction (25156)  [] ES(attended) (93854)      [] ES (un) (06655):       ASSESSMENT:  See eval      GOALS:   Patient stated goal: Pain relief. Therapist goals for Patient:   Short Term Goals: To be achieved in: 2 weeks  1. Independent in HEP and progression per patient tolerance, in order to prevent re-injury. [] Progressing: [] Met: [] Not Met: [] Adjusted  2. Patient will have a decrease in pain to facilitate improvement in movement, function, and ADLs as indicated by Functional Deficits. [] Progressing: [] Met: [] Not Met: [] Adjusted    Long Term Goals: To be achieved in: 8 weeks  1. Disability index score of 17% or less for the VIANCA to assist with reaching prior level of function. [] Progressing: [] Met: [] Not Met: [] Adjusted  2. Patient will demonstrate increased AROM to WNL, good LS mobility, good hip ROM to allow for proper joint functioning as indicated by patients Functional Deficits. [] Progressing: [] Met: [] Not Met: [] Adjusted  3. Patient will demonstrate an increase in Strength to good proximal hip and core activation to allow for proper functional mobility as indicated by patients Functional Deficits.    [] Progressing: [] Met: [] Not Met: [] Adjusted  4. Patient will return to functional activities including standing/walking 20-30 mins I no AD without increased symptoms or restriction. [] Progressing: [] Met: [] Not Met: [] Adjusted  5. Patient will perform all ADL's I no AD without increased symptoms or restriction (patient specific functional goal)    [] Progressing: [] Met: [] Not Met: [] Adjusted      Overall Progression Towards Functional goals/ Treatment Progress Update:  [] Patient is progressing as expected towards functional goals listed. [] Progression is slowed due to complexities/Impairments listed. [] Progression has been slowed due to co-morbidities. [x] Plan just implemented, too soon to assess goals progression <30days   [] Goals require adjustment due to lack of progress  [] Patient is not progressing as expected and requires additional follow up with physician  [] Other    Prognosis for POC: [x] Good [] Fair  [] Poor      Patient requires continued skilled intervention: [x] Yes  [] No    Treatment/Activity Tolerance:  [x] Patient able to complete treatment  [] Patient limited by fatigue  [] Patient limited by pain    [] Patient limited by other medical complications  [] Other:     Return to Play: (if applicable)   []  Stage 1: Intro to Strength   []  Stage 2: Return to Run and Strength   []  Stage 3: Return to Jump and Strength   []  Stage 4: Dynamic Strength and Agility   []  Stage 5: Sport Specific Training     []  Ready to Return to Play, Meets All Above Stages   []  Not Ready for Return to Sports   Comments:                           PLAN: See eval  [] Continue per plan of care [] Alter current plan (see comments above)  [x] Plan of care initiated [] Hold pending MD visit [] Discharge    Electronically signed by:  Chary Espinal PT    Note: If patient does not return for scheduled/ recommended follow up visits, this note will serve as a discharge from care along with most recent update on progress.

## 2021-06-01 ENCOUNTER — VIRTUAL VISIT (OUTPATIENT)
Dept: FAMILY MEDICINE CLINIC | Age: 31
End: 2021-06-01
Payer: COMMERCIAL

## 2021-06-01 DIAGNOSIS — M54.50 ACUTE LEFT-SIDED LOW BACK PAIN WITHOUT SCIATICA: Primary | ICD-10-CM

## 2021-06-01 PROCEDURE — 99213 OFFICE O/P EST LOW 20 MIN: CPT | Performed by: PHYSICIAN ASSISTANT

## 2021-06-01 PROCEDURE — 1036F TOBACCO NON-USER: CPT | Performed by: PHYSICIAN ASSISTANT

## 2021-06-01 PROCEDURE — G8427 DOCREV CUR MEDS BY ELIG CLIN: HCPCS | Performed by: PHYSICIAN ASSISTANT

## 2021-06-01 PROCEDURE — G8417 CALC BMI ABV UP PARAM F/U: HCPCS | Performed by: PHYSICIAN ASSISTANT

## 2021-06-01 RX ORDER — CYCLOBENZAPRINE HCL 10 MG
10 TABLET ORAL 3 TIMES DAILY PRN
Qty: 30 TABLET | Refills: 0 | Status: SHIPPED | OUTPATIENT
Start: 2021-06-01 | End: 2021-06-11

## 2021-06-01 RX ORDER — METHYLPREDNISOLONE 4 MG/1
TABLET ORAL
Qty: 1 KIT | Refills: 0 | Status: SHIPPED | OUTPATIENT
Start: 2021-06-01 | End: 2021-06-07

## 2021-06-01 SDOH — ECONOMIC STABILITY: FOOD INSECURITY: WITHIN THE PAST 12 MONTHS, THE FOOD YOU BOUGHT JUST DIDN'T LAST AND YOU DIDN'T HAVE MONEY TO GET MORE.: NEVER TRUE

## 2021-06-01 SDOH — ECONOMIC STABILITY: FOOD INSECURITY: WITHIN THE PAST 12 MONTHS, YOU WORRIED THAT YOUR FOOD WOULD RUN OUT BEFORE YOU GOT MONEY TO BUY MORE.: NEVER TRUE

## 2021-06-01 ASSESSMENT — PATIENT HEALTH QUESTIONNAIRE - PHQ9
SUM OF ALL RESPONSES TO PHQ QUESTIONS 1-9: 0
2. FEELING DOWN, DEPRESSED OR HOPELESS: 0
SUM OF ALL RESPONSES TO PHQ9 QUESTIONS 1 & 2: 0
SUM OF ALL RESPONSES TO PHQ QUESTIONS 1-9: 0
1. LITTLE INTEREST OR PLEASURE IN DOING THINGS: 0
SUM OF ALL RESPONSES TO PHQ QUESTIONS 1-9: 0

## 2021-06-01 ASSESSMENT — ENCOUNTER SYMPTOMS
BACK PAIN: 1
SHORTNESS OF BREATH: 0
COLOR CHANGE: 0

## 2021-06-01 ASSESSMENT — SOCIAL DETERMINANTS OF HEALTH (SDOH): HOW HARD IS IT FOR YOU TO PAY FOR THE VERY BASICS LIKE FOOD, HOUSING, MEDICAL CARE, AND HEATING?: NOT HARD AT ALL

## 2021-06-01 NOTE — LETTER
[address]  [facility] [address]  Phone: [phone]  Fax: [phone]    Cherri Mcmullen        June 1, 2021     Patient: Fina Villanueva   YOB: 1990   Date of Visit: 6/1/2021       To Whom It May Concern: It is my medical opinion that Leticia Pollack should abstain from lifting over 25 lbs while at work due to injury to her back. If you have any questions or concerns, please don't hesitate to call.     Sincerely,          LAURENCE Mcmullen

## 2021-06-01 NOTE — PROGRESS NOTES
2021    TELEHEALTH EVALUATION -- Audio/Visual (During IIQBA-84 public health emergency)    HPI:    Andres Leal (:  1990) has requested an audio/video evaluation for the following concern(s):    Symptoms started 4 days ago and are worsening  She has left side back pain and spasm that radiates \"throughout her body\"  She denies: left leg weakness, left leg numbness, perianal numbness  She is currently in physical therapy  She is taking ibuprofen, tylenol, flexeril, heat and ice with no relief  Cause: Pt lifted a 50 lb bag of flour at work      Review of Systems   Constitutional: Negative for diaphoresis, fatigue, fever and unexpected weight change. Respiratory: Negative for shortness of breath. Cardiovascular: Negative for chest pain. Musculoskeletal: Positive for back pain and myalgias. Negative for arthralgias, gait problem, neck pain and neck stiffness. Skin: Negative for color change and rash. Neurological: Negative for weakness and numbness. Prior to Visit Medications    Medication Sig Taking? Authorizing Provider   methylPREDNISolone (MEDROL, ISHMAEL,) 4 MG tablet Take by mouth as directed.  Yes LAURENCE Jensen   cyclobenzaprine (FLEXERIL) 10 MG tablet Take 1 tablet by mouth 3 times daily as needed for Muscle spasms Yes LAURENCE Jensen   Multiple Vitamins-Minerals (BARIATRIC FUSION PO) Take by mouth Yes Historical Provider, MD   levonorgestrel (MIRENA) IUD 52 mg 1 each by Intrauterine route once Yes Historical Provider, MD       Social History     Tobacco Use    Smoking status: Never Smoker    Smokeless tobacco: Never Used   Vaping Use    Vaping Use: Never assessed   Substance Use Topics    Alcohol use: Yes     Comment: 2 x/ yr    Drug use: No        No Known Allergies    PHYSICAL EXAMINATION:  [ INSTRUCTIONS:  \"[x]\" Indicates a positive item  \"[]\" Indicates a negative item  -- DELETE ALL ITEMS NOT EXAMINED]  Vital Signs: (As obtained by patient/caregiver or a billable service. Verbal consent to proceed has been obtained within the past 12 months. The visit was conducted pursuant to the emergency declaration under the [address] [place] [address] and the [employer] General Act. Patient identification was verified, and a caregiver was present when appropriate. The patient was located in a state where the provider was credentialed to provide care. Total time spent on this encounter: Not billed by time    --Joya Bumpers, PA on 6/1/2021 at 12:28 PM    An electronic signature was used to authenticate this note.

## 2021-06-01 NOTE — LETTER
[address]  [facility] [address]  Phone: [phone]  Fax: [phone]    Cherri Yoo        June 1, 2021     Patient: Yasmani Cole   YOB: 1990   Date of Visit: 6/1/2021       To Whom It May Concern: It is my medical opinion that Duarte Section should remain out of work until 06/04/21. Please excuse abscence for the week of 05/31/21 due to injury. If you have any questions or concerns, please don't hesitate to call.     Sincerely,          LAURENCE Yoo

## 2021-06-03 ENCOUNTER — PATIENT MESSAGE (OUTPATIENT)
Dept: FAMILY MEDICINE CLINIC | Age: 31
End: 2021-06-03

## 2021-06-03 NOTE — TELEPHONE ENCOUNTER
See MyChart note. Recent back injury. Treated with rest and meds per patient. Fall as described by patient. Pain at 7/10 level now. Using Tylenol for pain relief. Continues with muscle relaxers and steroids as directed by PCP PA. Able to weight bear today. Scheduled for same day appt tomorrow as below. Advised ED for excruciating pain. Patient voices understanding.      Future Appointments   Date Time Provider [place]   6/4/2021 10:00 AM LAURENCE Sesay

## 2021-06-04 ENCOUNTER — OFFICE VISIT (OUTPATIENT)
Dept: FAMILY MEDICINE CLINIC | Age: 31
End: 2021-06-04
Payer: COMMERCIAL

## 2021-06-04 VITALS
HEART RATE: 103 BPM | BODY MASS INDEX: 35.4 KG/M2 | OXYGEN SATURATION: 98 % | SYSTOLIC BLOOD PRESSURE: 102 MMHG | WEIGHT: 226 LBS | DIASTOLIC BLOOD PRESSURE: 70 MMHG

## 2021-06-04 DIAGNOSIS — M54.42 ACUTE LEFT-SIDED LOW BACK PAIN WITH LEFT-SIDED SCIATICA: ICD-10-CM

## 2021-06-04 DIAGNOSIS — R29.898 LEFT LEG WEAKNESS: Primary | ICD-10-CM

## 2021-06-04 PROCEDURE — 1036F TOBACCO NON-USER: CPT | Performed by: PHYSICIAN ASSISTANT

## 2021-06-04 PROCEDURE — G8427 DOCREV CUR MEDS BY ELIG CLIN: HCPCS | Performed by: PHYSICIAN ASSISTANT

## 2021-06-04 PROCEDURE — G8417 CALC BMI ABV UP PARAM F/U: HCPCS | Performed by: PHYSICIAN ASSISTANT

## 2021-06-04 PROCEDURE — 99214 OFFICE O/P EST MOD 30 MIN: CPT | Performed by: PHYSICIAN ASSISTANT

## 2021-06-04 RX ORDER — OXYCODONE HYDROCHLORIDE AND ACETAMINOPHEN 5; 325 MG/1; MG/1
1 TABLET ORAL EVERY 6 HOURS PRN
Qty: 28 TABLET | Refills: 0 | Status: SHIPPED | OUTPATIENT
Start: 2021-06-04 | End: 2021-06-11

## 2021-06-04 ASSESSMENT — ENCOUNTER SYMPTOMS
COLOR CHANGE: 0
BACK PAIN: 1

## 2021-06-04 NOTE — LETTER
[address]  [facility] [address]  Phone: [phone]  Fax: [phone]    Cherri Yi        June 4, 2021     Patient: Elfego Galvez   YOB: 1990   Date of Visit: 6/4/2021       To Whom It May Concern: It is my medical opinion that Boaz Mendes should remain out of work until evaluated by a spine specialist. I anticipate a return to work date of 06/14/21 . If you have any questions or concerns, please don't hesitate to call.     Sincerely,          LAURENCE Yi

## 2021-06-06 ENCOUNTER — PATIENT MESSAGE (OUTPATIENT)
Dept: FAMILY MEDICINE CLINIC | Age: 31
End: 2021-06-06

## 2021-06-07 RX ORDER — GABAPENTIN 300 MG/1
300 CAPSULE ORAL 2 TIMES DAILY
Qty: 60 CAPSULE | Refills: 0 | Status: SHIPPED | OUTPATIENT
Start: 2021-06-07 | End: 2021-06-23 | Stop reason: ALTCHOICE

## 2021-06-07 NOTE — TELEPHONE ENCOUNTER
From: Makeda Anand  To: LAURENCE Caro  Sent: 6/6/2021 3:14 PM EDT  Subject: Prescription Question    I have been taking meds as directed but I am getting no relief at night. I am getting this throbbing feeling in my hip and it hurts down my leg. I get it during the day to but I just try to distract myself and take my meds and it helps some what. It's to the point I'm getting no sleep and it's really affecting me. I'm not sure if I need stronger pain meds for at night or even if that's possible. I did make an appointment with the back doctor but the soonest they said they could get me in is the 14th unfortunately. I am going to set my mri appointment for this week coming so I have the images to take to the back doctor. This constant pain just is becoming overwhelming and exhausting.

## 2021-06-14 ENCOUNTER — OFFICE VISIT (OUTPATIENT)
Dept: ORTHOPEDIC SURGERY | Age: 31
End: 2021-06-14
Payer: COMMERCIAL

## 2021-06-14 VITALS — BODY MASS INDEX: 35.16 KG/M2 | HEIGHT: 67 IN | WEIGHT: 224 LBS

## 2021-06-14 DIAGNOSIS — M51.26 PROTRUSION OF LUMBAR INTERVERTEBRAL DISC: Primary | ICD-10-CM

## 2021-06-14 DIAGNOSIS — M54.16 LEFT LUMBAR RADICULITIS: ICD-10-CM

## 2021-06-14 DIAGNOSIS — M54.42 ACUTE LEFT-SIDED LOW BACK PAIN WITH LEFT-SIDED SCIATICA: ICD-10-CM

## 2021-06-14 PROCEDURE — 1036F TOBACCO NON-USER: CPT | Performed by: FAMILY MEDICINE

## 2021-06-14 PROCEDURE — L0625 LO FLEX L1-BELOW L5 PRE OTS: HCPCS | Performed by: FAMILY MEDICINE

## 2021-06-14 PROCEDURE — G8417 CALC BMI ABV UP PARAM F/U: HCPCS | Performed by: FAMILY MEDICINE

## 2021-06-14 PROCEDURE — 99213 OFFICE O/P EST LOW 20 MIN: CPT | Performed by: FAMILY MEDICINE

## 2021-06-14 PROCEDURE — G8427 DOCREV CUR MEDS BY ELIG CLIN: HCPCS | Performed by: FAMILY MEDICINE

## 2021-06-14 RX ORDER — GABAPENTIN 300 MG/1
CAPSULE ORAL
Qty: 90 CAPSULE | Refills: 1 | Status: SHIPPED | OUTPATIENT
Start: 2021-06-14 | End: 2021-06-23 | Stop reason: ALTCHOICE

## 2021-06-14 RX ORDER — DICLOFENAC SODIUM 75 MG/1
75 TABLET, DELAYED RELEASE ORAL 2 TIMES DAILY
Qty: 60 TABLET | Refills: 3 | Status: SHIPPED | OUTPATIENT
Start: 2021-06-14 | End: 2021-06-23 | Stop reason: ALTCHOICE

## 2021-06-14 NOTE — PROGRESS NOTES
consultation with review of her previous imaging. Pain Assessment  Location of Pain: Back  Location Modifiers: Posterior  Severity of Pain: 6  Quality of Pain: Dull, Aching, Sharp  Duration of Pain: Persistent  Frequency of Pain: Constant  Aggravating Factors: Walking, Standing (SITTING)  Limiting Behavior: Yes  Result of Injury: No  Work-Related Injury: No  Are there other pain locations you wish to document?: No       Medical History  Patient's medications, allergies, past medical, surgical, social and family histories were reviewed and updated as appropriate. Review of Systems  Pertinent items are noted in HPI  Review of systems reviewed from Patient History Form dated on 6/14/2021 and available in the patient's chart under the Media tab. Vital Signs  There were no vitals filed for this visit. General Exam:     Constitutional: Patient is adequately groomed with no evidence of malnutrition  DTRs: Deep tendon reflexes are intact  Mental Status: The patient is oriented to time, place and person. The patient's mood and affect are appropriate. Lymphatic: The lymphatic examination bilaterally reveals all areas to be without enlargement or induration. Vascular: Examination reveals no swelling or calf tenderness. Peripheral pulses are palpable and 2+. Neurological: The patient has good coordination. There is no weakness or sensory deficit. Lumbar/Sacral Spine Examination  Inspection: There is no high-grade deformity or soft tissue swelling. She is fairly tight. No palpable spasm. Palpation: Does have clinical tenderness over left greater right lumbar paraspinals with lower facet discomfort. She does have central gluteal tenderness left greater than right. No high-grade trochanteric pain. Rang of Motion: She can only forward flex to about 40. She does have painful extension with extension to the left producing pain into her left leg in the S1 distribution primarily.   50% reduction in lateral bending rotation. Strength: She does have giveaway type weakness with hip flexion and abduction on the left 4 out of 5. Special Tests: She does appear to have positive straight leg raise on the left. Negative on the right. Logroll testing negative. Skin: There are no rashes, ulcerations or lesions. Distal motor sensory and vascular exams intact. Gait: Mild antalgia and pain with positional change    Reflexes:  Symmetrically preserved     Additional Comments:     Additional Examinations:  Right Lower Extremity: Examination of the right lower extremity does not show any tenderness, deformity or injury. Range of motion is unremarkable. There is no gross instability. There are no rashes, ulcerations or lesions. Strength and tone are normal.  Left Lower Extremity: Examination of the left lower extremity does not show any tenderness, deformity or injury. Range of motion is unremarkable. There is no gross instability. There are no rashes, ulcerations or lesions. Strength and tone are normal.      Diagnostic Test Findings: AP and lateral lumbar spine films were reviewed from 5/5/2021 which did not show evidence of substantial degenerative disc changes or high-grade facet arthropathy. .  She is awaiting approval on lumbar MRI per Rajendra CHIANG      Assessment: #1.  2+ month status post exacerbation of episodic chronic mechanical low back pain with lumbar myofascial pain and left leg pain suspicious for radiculopathy    Impression:  Encounter Diagnoses   Name Primary?     Protrusion of lumbar intervertebral disc Yes    Left lumbar radiculitis     Acute left-sided low back pain with left-sided sciatica        Office Procedures:  Orders Placed This Encounter   Procedures    Ambulatory referral to Physical Therapy     Referral Priority:   Routine     Referral Type:   Eval and Treat     Referral Reason:   Specialty Services Required     Requested Specialty:   Physical Therapy Number of Visits Requested:   1    Bird and Cheri Extensor Lumbosacral Support Brace (Warm and Form)     Patient was prescribed a Bird and Cheri Extensor Lumbosacral Support Brace with a pocket. This orthosis is required for the following reasons:    Reduce pain by restricting mobility of the trunk  Facilitate healing following an injury to the spine or related soft tissues  Support weak spinal muscles    The patient was educated and fit by a healthcare professional with expert knowledge and specialization in brace application while under the direct supervision of the treating physician. Verbal and written instructions for the use of and application of this item were provided. They were instructed to contact the office immediately should the brace result in increased pain, decreased sensation, increased swelling or worsening of the condition. Treatment Plan:  Treatment options were discussed with Maura Hastings. We did review her previous plain films and current exam findings. She has been symptomatic for the past couple of months and while I think undoubtedly there is some muscular component to this, I am concerned about her left leg pain and the possible of her left S1 radiculopathy. She has finished a Medrol pack and we did start her on diclofenac 75 mg 1 pill twice daily. She may continue with her Flexeril from her primary care provider we did add gabapentin 300 mg tapering up to 300 mg 3 times daily. She was placed in a warm-and-form brace. I would like for her to continue with physical therapy. She is awaiting approval on the lumbar MRI but with her insurance she may need to go through a full course of therapy for 6 weeks prior to approval.  Icing and activity modification was discussed. We will have her see Shaq Tyree post imaging and potential need for epidural trials were discussed. She will contact us in interim with questions or concerns.         This dictation was performed with a verbal recognition program (DRAGON) and it was checked for errors. It is possible that there are still dictated errors within this office note. If so, please bring any errors to my attention for an addendum. All efforts were made to ensure that this office note is accurate.

## 2021-06-16 ENCOUNTER — TELEPHONE (OUTPATIENT)
Dept: FAMILY MEDICINE CLINIC | Age: 31
End: 2021-06-16

## 2021-06-18 ENCOUNTER — HOSPITAL ENCOUNTER (EMERGENCY)
Age: 31
Discharge: HOME | End: 2021-06-19
Attending: EMERGENCY MEDICINE
Payer: COMMERCIAL

## 2021-06-18 DIAGNOSIS — N30.00 ACUTE CYSTITIS WITHOUT HEMATURIA: Primary | ICD-10-CM

## 2021-06-18 DIAGNOSIS — R51.9 NONINTRACTABLE HEADACHE, UNSPECIFIED CHRONICITY PATTERN, UNSPECIFIED HEADACHE TYPE: ICD-10-CM

## 2021-06-18 LAB
A/G RATIO: 1.4 (ref 1.1–2.2)
ALBUMIN SERPL-MCNC: 4.1 G/DL (ref 3.4–5)
ALP BLD-CCNC: 57 U/L (ref 40–129)
ALT SERPL-CCNC: 15 U/L (ref 10–40)
ANION GAP SERPL CALCULATED.3IONS-SCNC: 11 MMOL/L (ref 3–16)
AST SERPL-CCNC: 17 U/L (ref 15–37)
BASOPHILS ABSOLUTE: 0 K/UL (ref 0–0.2)
BASOPHILS RELATIVE PERCENT: 0.3 %
BILIRUB SERPL-MCNC: 3.4 MG/DL (ref 0–1)
BILIRUBIN URINE: NEGATIVE
BLOOD, URINE: ABNORMAL
BUN BLDV-MCNC: 7 MG/DL (ref 7–20)
CALCIUM SERPL-MCNC: 9.2 MG/DL (ref 8.3–10.6)
CHLORIDE BLD-SCNC: 102 MMOL/L (ref 99–110)
CLARITY: ABNORMAL
CO2: 22 MMOL/L (ref 21–32)
COLOR: YELLOW
CREAT SERPL-MCNC: <0.5 MG/DL (ref 0.6–1.1)
EOSINOPHILS ABSOLUTE: 0 K/UL (ref 0–0.6)
EOSINOPHILS RELATIVE PERCENT: 0.2 %
GFR AFRICAN AMERICAN: >60
GFR NON-AFRICAN AMERICAN: >60
GLOBULIN: 3 G/DL
GLUCOSE BLD-MCNC: 115 MG/DL (ref 70–99)
GLUCOSE URINE: NEGATIVE MG/DL
HCG QUALITATIVE: NEGATIVE
HCT VFR BLD CALC: 38.7 % (ref 36–48)
HEMOGLOBIN: 13.2 G/DL (ref 12–16)
KETONES, URINE: 15 MG/DL
LEUKOCYTE ESTERASE, URINE: ABNORMAL
LYMPHOCYTES ABSOLUTE: 1.6 K/UL (ref 1–5.1)
LYMPHOCYTES RELATIVE PERCENT: 11.8 %
MCH RBC QN AUTO: 28.9 PG (ref 26–34)
MCHC RBC AUTO-ENTMCNC: 34 G/DL (ref 31–36)
MCV RBC AUTO: 84.8 FL (ref 80–100)
MICROSCOPIC EXAMINATION: YES
MONOCYTES ABSOLUTE: 1.3 K/UL (ref 0–1.3)
MONOCYTES RELATIVE PERCENT: 9.5 %
NEUTROPHILS ABSOLUTE: 10.5 K/UL (ref 1.7–7.7)
NEUTROPHILS RELATIVE PERCENT: 78.2 %
NITRITE, URINE: NEGATIVE
PDW BLD-RTO: 13.1 % (ref 12.4–15.4)
PH UA: 7.5 (ref 5–8)
PLATELET # BLD: 260 K/UL (ref 135–450)
PMV BLD AUTO: 7.5 FL (ref 5–10.5)
POTASSIUM REFLEX MAGNESIUM: 3.5 MMOL/L (ref 3.5–5.1)
PROTEIN UA: ABNORMAL MG/DL
RBC # BLD: 4.56 M/UL (ref 4–5.2)
SODIUM BLD-SCNC: 135 MMOL/L (ref 136–145)
SPECIFIC GRAVITY UA: 1.02 (ref 1–1.03)
TOTAL PROTEIN: 7.1 G/DL (ref 6.4–8.2)
TROPONIN: <0.01 NG/ML
URINE TYPE: ABNORMAL
UROBILINOGEN, URINE: 1 E.U./DL
WBC # BLD: 13.4 K/UL (ref 4–11)

## 2021-06-18 PROCEDURE — 81001 URINALYSIS AUTO W/SCOPE: CPT

## 2021-06-18 PROCEDURE — 80053 COMPREHEN METABOLIC PANEL: CPT

## 2021-06-18 PROCEDURE — 85025 COMPLETE CBC W/AUTO DIFF WBC: CPT

## 2021-06-18 PROCEDURE — 84484 ASSAY OF TROPONIN QUANT: CPT

## 2021-06-18 PROCEDURE — 99284 EMERGENCY DEPT VISIT MOD MDM: CPT

## 2021-06-18 PROCEDURE — 93005 ELECTROCARDIOGRAM TRACING: CPT | Performed by: PHYSICIAN ASSISTANT

## 2021-06-18 PROCEDURE — 87635 SARS-COV-2 COVID-19 AMP PRB: CPT

## 2021-06-18 PROCEDURE — 96365 THER/PROPH/DIAG IV INF INIT: CPT

## 2021-06-18 PROCEDURE — 96375 TX/PRO/DX INJ NEW DRUG ADDON: CPT

## 2021-06-18 PROCEDURE — 83735 ASSAY OF MAGNESIUM: CPT

## 2021-06-18 PROCEDURE — 84703 CHORIONIC GONADOTROPIN ASSAY: CPT

## 2021-06-18 PROCEDURE — 96366 THER/PROPH/DIAG IV INF ADDON: CPT

## 2021-06-18 RX ORDER — METHYLPREDNISOLONE 4 MG/1
TABLET ORAL
COMMUNITY
Start: 2021-06-01 | End: 2021-06-23 | Stop reason: ALTCHOICE

## 2021-06-18 RX ORDER — 0.9 % SODIUM CHLORIDE 0.9 %
1000 INTRAVENOUS SOLUTION INTRAVENOUS ONCE
Status: COMPLETED | OUTPATIENT
Start: 2021-06-19 | End: 2021-06-19

## 2021-06-18 ASSESSMENT — PAIN SCALES - GENERAL: PAINLEVEL_OUTOF10: 2

## 2021-06-18 ASSESSMENT — PAIN DESCRIPTION - FREQUENCY: FREQUENCY: CONTINUOUS

## 2021-06-18 ASSESSMENT — PAIN DESCRIPTION - PAIN TYPE: TYPE: ACUTE PAIN

## 2021-06-18 ASSESSMENT — PAIN DESCRIPTION - LOCATION: LOCATION: RIB CAGE

## 2021-06-18 ASSESSMENT — PAIN DESCRIPTION - DESCRIPTORS: DESCRIPTORS: ACHING

## 2021-06-19 ENCOUNTER — APPOINTMENT (OUTPATIENT)
Dept: CT IMAGING | Age: 31
End: 2021-06-19
Payer: COMMERCIAL

## 2021-06-19 ENCOUNTER — APPOINTMENT (OUTPATIENT)
Dept: GENERAL RADIOLOGY | Age: 31
End: 2021-06-19
Payer: COMMERCIAL

## 2021-06-19 VITALS
BODY MASS INDEX: 35.16 KG/M2 | RESPIRATION RATE: 14 BRPM | DIASTOLIC BLOOD PRESSURE: 68 MMHG | HEIGHT: 67 IN | TEMPERATURE: 99.9 F | HEART RATE: 95 BPM | WEIGHT: 224 LBS | OXYGEN SATURATION: 96 % | SYSTOLIC BLOOD PRESSURE: 108 MMHG

## 2021-06-19 LAB
BACTERIA: ABNORMAL /HPF
D DIMER: 294 NG/ML DDU (ref 0–229)
EKG ATRIAL RATE: 107 BPM
EKG DIAGNOSIS: NORMAL
EKG P AXIS: 24 DEGREES
EKG P-R INTERVAL: 184 MS
EKG Q-T INTERVAL: 336 MS
EKG QRS DURATION: 84 MS
EKG QTC CALCULATION (BAZETT): 448 MS
EKG R AXIS: 61 DEGREES
EKG T AXIS: 7 DEGREES
EKG VENTRICULAR RATE: 107 BPM
EPITHELIAL CELLS, UA: ABNORMAL /HPF (ref 0–5)
MAGNESIUM: 1.8 MG/DL (ref 1.8–2.4)
RBC UA: ABNORMAL /HPF (ref 0–4)
REASON FOR REJECTION: NORMAL
REJECTED TEST: NORMAL
SARS-COV-2, NAAT: NOT DETECTED
WBC UA: >100 /HPF (ref 0–5)

## 2021-06-19 PROCEDURE — 85379 FIBRIN DEGRADATION QUANT: CPT

## 2021-06-19 PROCEDURE — 96365 THER/PROPH/DIAG IV INF INIT: CPT

## 2021-06-19 PROCEDURE — 36415 COLL VENOUS BLD VENIPUNCTURE: CPT

## 2021-06-19 PROCEDURE — 6360000002 HC RX W HCPCS: Performed by: PHYSICIAN ASSISTANT

## 2021-06-19 PROCEDURE — 71046 X-RAY EXAM CHEST 2 VIEWS: CPT

## 2021-06-19 PROCEDURE — 96366 THER/PROPH/DIAG IV INF ADDON: CPT

## 2021-06-19 PROCEDURE — 93010 ELECTROCARDIOGRAM REPORT: CPT | Performed by: INTERNAL MEDICINE

## 2021-06-19 PROCEDURE — 2580000003 HC RX 258: Performed by: PHYSICIAN ASSISTANT

## 2021-06-19 PROCEDURE — 96375 TX/PRO/DX INJ NEW DRUG ADDON: CPT

## 2021-06-19 RX ORDER — KETOROLAC TROMETHAMINE 30 MG/ML
30 INJECTION, SOLUTION INTRAMUSCULAR; INTRAVENOUS ONCE
Status: COMPLETED | OUTPATIENT
Start: 2021-06-19 | End: 2021-06-19

## 2021-06-19 RX ORDER — CEPHALEXIN 500 MG/1
500 CAPSULE ORAL 2 TIMES DAILY
Qty: 14 CAPSULE | Refills: 0 | Status: SHIPPED | OUTPATIENT
Start: 2021-06-19 | End: 2021-06-26

## 2021-06-19 RX ORDER — DIPHENHYDRAMINE HYDROCHLORIDE 50 MG/ML
25 INJECTION INTRAMUSCULAR; INTRAVENOUS ONCE
Status: COMPLETED | OUTPATIENT
Start: 2021-06-19 | End: 2021-06-19

## 2021-06-19 RX ORDER — METOCLOPRAMIDE HYDROCHLORIDE 5 MG/ML
10 INJECTION INTRAMUSCULAR; INTRAVENOUS ONCE
Status: COMPLETED | OUTPATIENT
Start: 2021-06-19 | End: 2021-06-19

## 2021-06-19 RX ADMIN — METOCLOPRAMIDE HYDROCHLORIDE 10 MG: 5 INJECTION INTRAMUSCULAR; INTRAVENOUS at 01:13

## 2021-06-19 RX ADMIN — SODIUM CHLORIDE 1000 ML: 9 INJECTION, SOLUTION INTRAVENOUS at 00:10

## 2021-06-19 RX ADMIN — KETOROLAC TROMETHAMINE 30 MG: 30 INJECTION, SOLUTION INTRAMUSCULAR; INTRAVENOUS at 01:13

## 2021-06-19 RX ADMIN — DIPHENHYDRAMINE HYDROCHLORIDE 25 MG: 50 INJECTION, SOLUTION INTRAMUSCULAR; INTRAVENOUS at 01:13

## 2021-06-19 RX ADMIN — CEFTRIAXONE SODIUM 1000 MG: 1 INJECTION, POWDER, FOR SOLUTION INTRAMUSCULAR; INTRAVENOUS at 01:18

## 2021-06-19 ASSESSMENT — PAIN SCALES - GENERAL: PAINLEVEL_OUTOF10: 8

## 2021-06-19 NOTE — ED PROVIDER NOTES
[facility] Emergency Department    CHIEF COMPLAINT  Fever (103 at home, cannot bring down) and Generalized Body Aches (start at 0700)      SHARED SERVICE VISIT  Evaluated by BARRERA. My supervising physician was available for consultation. HISTORY OF PRESENT ILLNESS  Makeda Anand is a 35 y.o. female who presents to the ED complaining of fever, generalized body aches and shortness of breath that have been ongoing since this morning. Patient states she awoke and was feeling generally unwell and has recorded fevers of 103 at home. Patient states she has been taking Tylenol. Patient also reports some shortness of breath as well as chest pain with deep inspiration. She feels like she is unable to take a deep breath. Denies any cough, sore throat or nasal congestion. Denies any prior history of coagulopathy, leg swelling, recent flights or recent surgeries. Admits to hormone use. Denies tobacco use. Denies any known exposure to sick contacts. Denies COVID-19 vaccination. [place] any history of respiratory disease. Patient does report that for the past week or so she has had post void discomfort that lasted roughly 5 minutes. Denies any abdominal pain or flank pain. No other complaints, modifying factors or associated symptoms. Nursing notes reviewed.    Past Medical History:   Diagnosis Date    Acid reflux     Broken tooth     COVID-19 12/2020     Past Surgical History:   Procedure Laterality Date    CHOLECYSTECTOMY      SLEEVE GASTRECTOMY N/A 9/15/2020    ROBOTIC ASSISTED LAPAROSCOPIC VERTICAL SLEEVE GASTRECTOMY, HIATAL HERNIA REPAIR performed by Kip Robledo DO at [address] N/A 6/8/2020    EGD BIOPSY performed by Kip Robledo DO at [address] ENDOSCOPY     Family History   Problem Relation Age of Onset    Other Mother         hyperactive thyroid    Other Maternal Aunt         hyperactive thyroid    Heart Disease Maternal Grandmother  Hypertension Maternal Grandmother     Elevated Lipids Maternal Grandmother     Heart Disease Maternal Grandfather     Hypertension Maternal Grandfather     Elevated Lipids Maternal Grandfather     Breast Cancer Paternal Grandmother      Social History     Socioeconomic History    Marital status: Single     Spouse name: Not on file    Number of children: Not on file    Years of education: Not on file    Highest education level: Not on file   Occupational History    Not on file   Tobacco Use    Smoking status: Never Smoker    Smokeless tobacco: Never Used   Vaping Use    Vaping Use: Never assessed   Substance and Sexual Activity    Alcohol use: Not Currently     Comment: 2 x/ yr    Drug use: No    Sexual activity: Yes     Partners: Male   Other Topics Concern    Not on file   Social History Narrative    Not on file     Social Determinants of Health     Financial Resource Strain: Low Risk     Difficulty of Paying Living Expenses: Not hard at all   Food Insecurity: No Food Insecurity    Worried About [address] in the Last Year: Never true    [address] in the Last Year: Never true   Transportation Needs:     Lack of Transportation (Medical):  Lack of Transportation (Non-Medical):    Physical Activity:     Days of Exercise per Week:     Minutes of Exercise per Session:    Stress:     Feeling of Stress :    Social Connections:     Frequency of Communication with Friends and Family:     Frequency of Social Gatherings with Friends and Family:     Attends Denominational Services:     Active Member of Clubs or Organizations:     Attends Club or Organization Meetings:     Marital Status:    Intimate Partner Violence:     Fear of Current or Ex-Partner:     Emotionally Abused:     Physically Abused:     Sexually Abused:      No current facility-administered medications for this encounter.      Current Outpatient Medications   Medication Sig Dispense Refill    cephALEXin (KEFLEX) 500 MG capsule Take 1 capsule by mouth 2 times daily for 7 days 14 capsule 0    diclofenac (VOLTAREN) 75 MG EC tablet Take 1 tablet by mouth 2 times daily 60 tablet 3    gabapentin (NEURONTIN) 300 MG capsule Take 1 capsule by mouth 2 times daily for 30 days. Intended supply: 30 days 60 capsule 0    Multiple Vitamins-Minerals (BARIATRIC FUSION PO) Take by mouth      levonorgestrel (MIRENA) IUD 52 mg 1 each by Intrauterine route once      methylPREDNISolone (MEDROL) 4 MG tablet       gabapentin (NEURONTIN) 300 MG capsule Take one tablet once daily for 2 days. Then take one tablet twice daily for 2 days. Then take one tablet three times daily for rest 90 capsule 1     No Known Allergies    REVIEW OF SYSTEMS  10 systems reviewed, pertinent positives per HPI otherwise noted to be negative    PHYSICAL EXAM  /68   Pulse 95   Temp 99.9 °F (37.7 °C) (Oral)   Resp 14   Ht 5' 7\" (1.702 m)   Wt 224 lb (101.6 kg)   SpO2 96%   BMI 35.08 kg/m²   GENERAL APPEARANCE: Awake and alert. Cooperative. No acute distress. HEAD: Normocephalic. Atraumatic. EYES: PERRL. EOM's grossly intact. ENT: Mucous membranes are moist.   NECK: Supple. HEART: Tachycardic, regular rhythm. No murmurs. LUNGS: Respirations unlabored. CTAB. Good air exchange. Speaking comfortably in full sentences. No wheeze appreciated. ABDOMEN: Soft. Non-distended. Non-tender. No guarding or rebound. No masses. No organomegaly. EXTREMITIES: No peripheral edema. Moves all extremities equally. All extremities neurovascularly intact. No calf pain. SKIN: Warm and dry. No acute rashes. NEUROLOGICAL: Alert and oriented. CN's 2-12 intact. No gross facial drooping. Strength 5/5, sensation intact. PSYCHIATRIC: Normal mood and affect. RADIOLOGY  XR CHEST (2 VW)   Final Result   No acute cardiopulmonary process.                LABS  Labs Reviewed   CBC WITH AUTO DIFFERENTIAL - Abnormal; Notable for the following components:       Result Value    WBC 13.4 (*)     Neutrophils Absolute 10.5 (*)     All other components within normal limits    Narrative:     Performed at:  [address], [facility] [place]   Phone [phone]   COMPREHENSIVE METABOLIC PANEL W/ REFLEX TO MG FOR LOW K - Abnormal; Notable for the following components:    Sodium 135 (*)     Glucose 115 (*)     CREATININE <0.5 (*)     Total Bilirubin 3.4 (*)     All other components within normal limits    Narrative:     Performed at:  [place] [address] [place]   Phone [phone] - Abnormal; Notable for the following components:    Clarity, UA CLOUDY (*)     Ketones, Urine 15 (*)     Blood, Urine SMALL (*)     Protein, UA TRACE (*)     Leukocyte Esterase, Urine MODERATE (*)     All other components within normal limits    Narrative:     Performed at:  [place] [address] [place]   Phone [phone]   MICROSCOPIC URINALYSIS - Abnormal; Notable for the following components:    WBC, UA >100 (*)     Epithelial Cells, UA 6-10 (*)     Bacteria, UA 2+ (*)     All other components within normal limits    Narrative:     Performed at:  [place] [address] [place]   Phone [phone]   D-DIMER, QUANTITATIVE - Abnormal; Notable for the following components:    D-Dimer, Quant 294 (*)     All other components within normal limits    Narrative:     Performed at:  [place] [address] [place]   Phone [phone], RAPID    Narrative:     Performed at:  [place] [address] [place]   Phone [phone]   TROPONIN    Narrative:     Performed at:  [place] [address] [place]   Phone [phone]   HCG, SERUM, QUALITATIVE    Narrative:     Performed at:  [facility]) [address], [facility] [place]   Phone [phone]   MAGNESIUM    Narrative:     Performed at:  [facility]) [address], [facility] [employer]   Phone [phone]    Narrative:     Mreyl Garvin Shoulder [id number],  Rejected Test Name/Called to:Adriel/ Jericho Cormier RN, 06/19/2021 00:46, by  Hui Cassette CANCELLED 06/19/2021 00:43, Rejected: Hemolyzed. Performed at:  [address], [facility] [place]   Phone [phone]       PROCEDURES  Unless otherwise noted below, none  Procedures    ED COURSE  Patient received Toradol Reglan Benadryl for her headache. Patient was started on IV fluid due to tachycardia. ED Course as of Jun 19 1653   Sat Jun 19, 2021   0014 Given patient's tachycardia patient was placed on a cardiac monitor for frequent reevaluation's. Patient was started on IV fluid. [MM]   0014 Reevaluation patient cardiac monitor patient's heart has reduced 9 9 bpm normal sinus rhythm. [MM]   1381 On reevaluation reevaluation pain is cardiac monitor patient's heart rate is 104 bpm normal sinus rhythm. [MM]      ED Course User Index  [MM] Maurizio Enriquez PA-C       CRITICAL CARE TIME      MDM  MDM  52-year-old female presents emergency department for evaluation of fever, generalized body ache as well as soreness of breath and pleuritic chest pain that began today. No prior history of coagulopathy but does admit to hormone use. No calf swelling. On arrival to the ED patient's heart rate was tachycardic at a rate of 110 bpm, afebrile with an oxygen saturation 98% on room air. Patient does not appear to be in respiratory distress. Physical exam is unremarkable. Lab work was obtained which demonstrates a slight leukocytosis at 13.4.   Troponin was within normal limits. Refer to attending note for EKG interpretation. CMP was unremarkable. COVID-19 test was not detected. S x-ray demonstrated no acute disease process. His urinalysis was remarkable for moderate leukocytes and small blood. Pending microscopic interpretation. Currently pending D-dimer. [place] with the patient that at this time given her tachycardia as well as complaints of shortness of breath and further chest pain will obtain D-dimer to evaluate risk of pulmonary embolism as underlying etiology. Patient is considered low risk that is why D-dimer was performed prior to advanced imaging. 19 was ruled out. Given patient's age, risk factors and lab work interpretation. Patient's heart score would be considered low risk for major adverse cardiac event is recommended she be discharged home with follow-up with her primary care provider good return precautions. Patient's urinalysis was returned which was positive for greater than 100 WBCs. Leukocyte positive. She denied any additional symptoms such as flank pain or abdominal pain. Patient was given a gram of Rocephin while in the emergency department. Patient will be sent home with a prescription of Keflex and follow-up with her primary care provider. DISPOSITION  Patient was discharged to home in good condition. CLINICAL IMPRESSION  1. Acute cystitis without hematuria    2.  Nonintractable headache, unspecified chronicity pattern, unspecified headache type            Jenny Britton PA-C  06/19/21 6534

## 2021-06-19 NOTE — ED PROVIDER NOTES
EKG interpretation:  Sinus tachycardia, rate 107, normal axis, QTC within normal limits, no acute ST or T wave changes from prior dated 10/8/2020     Leeanne Smith MD  06/18/21 9651      I independently performed a history and physical on Peterson Valdivia. All diagnostic, treatment, and disposition decisions were made by myself in conjunction with the advanced practice provider. Patient with fever, body aches, headache found to have acute UTI. During review of systems she mentions some discomfort in her ribs when taking a deep breath. There is however no report of dyspnea, leg swelling, hemoptysis, recent prolonged immobility or travel, recent surgery, or history of prior DVT/PE. D-dimer was mildly elevated however I believe this to be a false positive. Patient appears to be feeling unwell and mildly tachycardic due to her acute UTI with fever. She has been stuck at least a half dozen times by nursing in an attempt to obtain an IV for CT of the chest.  I also personally placed an ultrasound guided IV into her right basilic vein which unfortunately blew under pressure injection of contrast with some extravasation of contrast into her arm. At this point patient declines any further IV attempts or repeat CT attempts. I believe this is reasonable on her part. Advised to return immediately with any symptoms of DVT/PE which we discussed at length. Patient was also provided with instructions regarding care for her right upper extremity status post contrast infiltration. For further details of Rachna Pineda's emergency department encounter, please see LAURENCE Mills's documentation.        Leeanne Smith MD  06/19/21 0484

## 2021-06-23 ENCOUNTER — TELEPHONE (OUTPATIENT)
Dept: FAMILY MEDICINE CLINIC | Age: 31
End: 2021-06-23

## 2021-06-23 ENCOUNTER — OFFICE VISIT (OUTPATIENT)
Dept: FAMILY MEDICINE CLINIC | Age: 31
End: 2021-06-23
Payer: COMMERCIAL

## 2021-06-23 VITALS
WEIGHT: 224 LBS | SYSTOLIC BLOOD PRESSURE: 124 MMHG | HEART RATE: 72 BPM | DIASTOLIC BLOOD PRESSURE: 88 MMHG | BODY MASS INDEX: 35.08 KG/M2

## 2021-06-23 DIAGNOSIS — R07.1 CHEST PAIN ON BREATHING: ICD-10-CM

## 2021-06-23 DIAGNOSIS — R17 ELEVATED BILIRUBIN: ICD-10-CM

## 2021-06-23 DIAGNOSIS — N30.01 ACUTE CYSTITIS WITH HEMATURIA: Primary | ICD-10-CM

## 2021-06-23 DIAGNOSIS — G47.00 INSOMNIA, UNSPECIFIED TYPE: ICD-10-CM

## 2021-06-23 LAB
BILIRUBIN, POC: ABNORMAL
BLOOD URINE, POC: ABNORMAL
CLARITY, POC: CLEAR
COLOR, POC: YELLOW
D DIMER: 251 NG/ML DDU (ref 0–229)
EPITHELIAL CELLS, UA: 4 /HPF (ref 0–5)
GLUCOSE URINE, POC: ABNORMAL
HYALINE CASTS: 2 /LPF (ref 0–8)
KETONES, POC: ABNORMAL
LEUKOCYTE EST, POC: ABNORMAL
NITRITE, POC: ABNORMAL
PH, POC: 6.5
PROTEIN, POC: ABNORMAL
RBC UA: 9 /HPF (ref 0–4)
SPECIFIC GRAVITY, POC: 1.02
URINE TYPE: ABNORMAL
UROBILINOGEN, POC: 0.2
WBC UA: 1 /HPF (ref 0–5)

## 2021-06-23 PROCEDURE — 1036F TOBACCO NON-USER: CPT | Performed by: PHYSICIAN ASSISTANT

## 2021-06-23 PROCEDURE — G8427 DOCREV CUR MEDS BY ELIG CLIN: HCPCS | Performed by: PHYSICIAN ASSISTANT

## 2021-06-23 PROCEDURE — 81002 URINALYSIS NONAUTO W/O SCOPE: CPT | Performed by: PHYSICIAN ASSISTANT

## 2021-06-23 PROCEDURE — G8417 CALC BMI ABV UP PARAM F/U: HCPCS | Performed by: PHYSICIAN ASSISTANT

## 2021-06-23 PROCEDURE — 99214 OFFICE O/P EST MOD 30 MIN: CPT | Performed by: PHYSICIAN ASSISTANT

## 2021-06-23 RX ORDER — TRAZODONE HYDROCHLORIDE 50 MG/1
50 TABLET ORAL NIGHTLY PRN
Qty: 30 TABLET | Refills: 1 | Status: SHIPPED | OUTPATIENT
Start: 2021-06-23 | End: 2022-03-18 | Stop reason: ALTCHOICE

## 2021-06-23 ASSESSMENT — ENCOUNTER SYMPTOMS
ABDOMINAL PAIN: 0
CHEST TIGHTNESS: 1
SHORTNESS OF BREATH: 1
WHEEZING: 0

## 2021-06-23 NOTE — TELEPHONE ENCOUNTER
Please notify the pt that her MRI was declined.  Please continue follow up with the [facility] spine [facility] and resume PT if not already doing so

## 2021-06-23 NOTE — TELEPHONE ENCOUNTER
[facility] calling about pts MRI lumbar that the clinical info that was sent over didn't meet there clinical guide lines.  They stated that [employer] could submit more clinical info or do a peer to peer and to call [phone]

## 2021-06-23 NOTE — PROGRESS NOTES
2021  Verner Stager (: 1990)  35 y.o.      HPI  The pt is here for ER follow up  She was seen in the ED on 21 and diagnosed with acute cystitis and headache. She was sent home with keflex. POCt UA showed RBC small and 100+ WBC. No urine culture was sent. COVID-19 test was negative. Total bilirubin was 3.4. CBC was 13.4 with left shift. Her d-dimer was very slightly elevated in the upper 200's. Current symptoms: sharp pain in the left side of chest with deep breath, occasional lightheadedness with exertion  Denies any urinary symptoms. She does not notice any blood. Insomnia:   Pt is having difficulty falling to sleep at night  She is sleeping less than 6 hours at night, waking frequently  The lack of sleep is starting to affect her mood    Review of Systems   Constitutional: Negative for diaphoresis, fatigue and unexpected weight change. Respiratory: Positive for chest tightness and shortness of breath. Negative for wheezing. Cardiovascular: Positive for chest pain. Negative for palpitations and leg swelling. Gastrointestinal: Negative for abdominal pain. Genitourinary: Negative for difficulty urinating, dysuria, flank pain, hematuria, pelvic pain and urgency. Neurological: Negative for dizziness, light-headedness and headaches. Hematological: Negative for adenopathy. Psychiatric/Behavioral: Positive for agitation and sleep disturbance. The patient is nervous/anxious. Allergies, past medical history, family history, and social history reviewed and unchanged from previous encounter.      Current Outpatient Medications   Medication Sig Dispense Refill    traZODone (DESYREL) 50 MG tablet Take 1 tablet by mouth nightly as needed for Sleep 30 tablet 1    cephALEXin (KEFLEX) 500 MG capsule Take 1 capsule by mouth 2 times daily for 7 days 14 capsule 0    Multiple Vitamins-Minerals (BARIATRIC FUSION PO) Take by mouth      levonorgestrel (MIRENA) IUD 52 mg 1 each by Intrauterine route once       No current facility-administered medications for this visit. Vitals:    06/23/21 1508   BP: 124/88   Pulse: 72   Weight: 224 lb (101.6 kg)     Estimated body mass index is 35.08 kg/m² as calculated from the following:    Height as of 6/18/21: 5' 7\" (1.702 m). Weight as of this encounter: 224 lb (101.6 kg). Physical Exam  Vitals reviewed. Constitutional:       Appearance: Normal appearance. Cardiovascular:      Rate and Rhythm: Normal rate and regular rhythm. Heart sounds: Normal heart sounds. Pulmonary:      Effort: Pulmonary effort is normal.      Breath sounds: Normal breath sounds. Chest:      Chest wall: No tenderness. Abdominal:      General: Bowel sounds are normal.      Palpations: Abdomen is soft. There is no mass. Tenderness: There is no abdominal tenderness. Musculoskeletal:      Right lower leg: No tenderness. No edema. Left lower leg: No tenderness. No edema. Neurological:      Mental Status: She is alert and oriented to person, place, and time. Cranial Nerves: No cranial nerve deficit. Psychiatric:         Attention and Perception: Attention and perception normal.         Mood and Affect: Mood is anxious. Speech: Speech normal.         Behavior: Behavior normal. Behavior is cooperative. Thought Content: Thought content normal.         Cognition and Memory: Cognition and memory normal.         Judgment: Judgment normal.         ASSESSMENT and PLAN:  Erika Eddy was seen today for other and discuss labs. Diagnoses and all orders for this visit:    Acute cystitis with hematuria  -     POCT Urinalysis no Micro: moderate blood. This is an increase in blood with resolution of her WBC. Will review a micro before imaging of kidneys.  -     MICROSCOPIC URINALYSIS    Elevated bilirubin  -     HEPATIC FUNCTION PANEL; Future  -     No bilirubin noted in her urine, possibly a lab error, will recheck.  If still elevated she will need an US of her liver    Chest pain on breathing  -     D-DIMER, QUANTITATIVE; Future        -     The pt would like to recheck her d-dimer today before getting imaging of her chest. She is aware of the risks of waiting if this is in fact a DVT    Insomnia, unspecified type  -     traZODone (DESYREL) 50 MG tablet; Take 1 tablet by mouth nightly as needed for Sleep  -     Medication risks, benefits and side effects were discussed with the pt. Ok to increase to 100 mg on her own if needed      Return if symptoms worsen or fail to improve.

## 2021-06-24 LAB
ALBUMIN SERPL-MCNC: 4.1 G/DL (ref 3.4–5)
ALP BLD-CCNC: 58 U/L (ref 40–129)
ALT SERPL-CCNC: 11 U/L (ref 10–40)
AST SERPL-CCNC: 21 U/L (ref 15–37)
BILIRUB SERPL-MCNC: 0.9 MG/DL (ref 0–1)
BILIRUBIN DIRECT: <0.2 MG/DL (ref 0–0.3)
BILIRUBIN, INDIRECT: NORMAL MG/DL (ref 0–1)
TOTAL PROTEIN: 7.6 G/DL (ref 6.4–8.2)

## 2021-06-25 DIAGNOSIS — R31.9 HEMATURIA, UNSPECIFIED TYPE: Primary | ICD-10-CM

## 2021-08-31 ENCOUNTER — HOSPITAL ENCOUNTER (OUTPATIENT)
Dept: CT IMAGING | Age: 31
Discharge: HOME | End: 2021-08-31
Payer: COMMERCIAL

## 2021-08-31 DIAGNOSIS — R31.9 HEMATURIA, UNSPECIFIED TYPE: ICD-10-CM

## 2021-08-31 PROCEDURE — 74176 CT ABD & PELVIS W/O CONTRAST: CPT

## 2021-09-01 ENCOUNTER — PATIENT MESSAGE (OUTPATIENT)
Dept: FAMILY MEDICINE CLINIC | Age: 31
End: 2021-09-01

## 2021-09-01 NOTE — TELEPHONE ENCOUNTER
From: Olu Anne  To: LAURENCE Bejarano  Sent: 9/1/2021 10:18 AM EDT  Subject: Test Results Question    Hello I tried calling you back at the office a couple times but was unable to reach anyone.  I'm available all day so when ever is convenient for you could you give me a call

## 2021-09-03 ENCOUNTER — PATIENT MESSAGE (OUTPATIENT)
Dept: FAMILY MEDICINE CLINIC | Age: 31
End: 2021-09-03

## 2021-09-03 RX ORDER — AMOXICILLIN 875 MG/1
875 TABLET, COATED ORAL 2 TIMES DAILY
Qty: 20 TABLET | Refills: 0 | Status: SHIPPED | OUTPATIENT
Start: 2021-09-03 | End: 2022-03-18 | Stop reason: SDUPTHER

## 2021-09-03 NOTE — TELEPHONE ENCOUNTER
From: Moreno Atkins  To: LAURENCE Beatty  Sent: 9/3/2021 12:11 PM EDT  Subject: Non-Urgent Medical Question    I am having terrible tooth and jaw pain. I would reach out to my dentist office but they are closed today. It's so bad I have missed work today. The pain is throbbing and constant. Only thing that helps a little is when I drink cold water but a few minutes later the pain is back. I have seen the dentist a couple weeks back and there is a plan in place for a few of my teeth. One being a root canal but I can't get that done until the 16th of this month. I was just wondering if there is anything I can do. The pain is so bad and I just want relief. I really don't want to go to the er but if that's my only option I will.    Thank you

## 2021-09-29 ENCOUNTER — OFFICE VISIT (OUTPATIENT)
Dept: SURGERY | Age: 31
End: 2021-09-29
Payer: COMMERCIAL

## 2021-09-29 VITALS
SYSTOLIC BLOOD PRESSURE: 132 MMHG | RESPIRATION RATE: 16 BRPM | HEIGHT: 67 IN | OXYGEN SATURATION: 98 % | WEIGHT: 232 LBS | TEMPERATURE: 98 F | DIASTOLIC BLOOD PRESSURE: 91 MMHG | BODY MASS INDEX: 36.41 KG/M2 | HEART RATE: 77 BPM

## 2021-09-29 DIAGNOSIS — N62 MACROMASTIA: Primary | ICD-10-CM

## 2021-09-29 DIAGNOSIS — M79.3 PANNICULITIS: ICD-10-CM

## 2021-09-29 PROCEDURE — G8427 DOCREV CUR MEDS BY ELIG CLIN: HCPCS | Performed by: SURGERY

## 2021-09-29 PROCEDURE — 99214 OFFICE O/P EST MOD 30 MIN: CPT | Performed by: SURGERY

## 2021-09-29 PROCEDURE — 1036F TOBACCO NON-USER: CPT | Performed by: SURGERY

## 2021-09-29 PROCEDURE — G8417 CALC BMI ABV UP PARAM F/U: HCPCS | Performed by: SURGERY

## 2021-09-29 NOTE — PROGRESS NOTES
[facility]    CC: Symptomatic macromastia    REFERRING PHYSICIAN: Zakiya Benitez    HPI: This is a 28 y.o.  female who presents to clinic with desire for breast reduction. She has had symptoms of macromastia for years, however over the past few months, it has worsened to the point that it is limiting her activity. Since her last evaluation, she was referred to bariatrics and she underwent a sleeve gastrectomy with Dr. Unruly Benson () losing a total of 90 lbs. Since that time, she notes that her symptoms of macromastia have persisted and additionally, she notes that she is having problems with excess skin of her abdomen as well as her breasts. She notes that her breast rashes have worsened with the weight loss. She feels that she has lost some volume, but her symptoms have persisted.     Her pertinent breast history include the following:    Last Mammogram: None    Current bra size: 42F  Desired bra size: As small as possible \"proportionate\"  Pregnancies/miscarriages: 2 / 0  Breast feeding: no future plans    Breast Symptoms:    Macromastia Symptoms:  Upper back pain: Yes      Bra strap grooves: Yes      Wears supportive bras (>1 yr): Yes      Tried conservative measures (PT, MDs,etc): Yes      Intertrigo: Yes      Head/neck pain: Yes      Headaches: Yes      Paresthesias of hands/fingers: Yes      PMHx: Migranes  PSHx:   Past Surgical History:   Procedure Laterality Date    CHOLECYSTECTOMY      SLEEVE GASTRECTOMY N/A 9/15/2020    ROBOTIC ASSISTED LAPAROSCOPIC VERTICAL SLEEVE GASTRECTOMY, HIATAL HERNIA REPAIR performed by Eunice Alejandra DO at [facility] ENDOSCOPY N/A 2020    EGD BIOPSY performed by Eunice Alejandra DO at [address] [place] [address]: No Known Allergies  SOCIAL: No tobacco, no ETOH, no IVD  FHx: Past history of breast CA: Yes (paternal grandmother and another paternal member)   Past family members with breast reduction: No   Past family members with breast augmentation:No    Meds:   Current Outpatient Medications   Medication Sig Dispense Refill    traZODone (DESYREL) 50 MG tablet Take 1 tablet by mouth nightly as needed for Sleep 30 tablet 1    Multiple Vitamins-Minerals (BARIATRIC FUSION PO) Take by mouth      levonorgestrel (MIRENA) IUD 52 mg 1 each by Intrauterine route once       No current facility-administered medications for this visit. ROS   Constitutional: Negative for chills and fever. HENT: Negative for congestion, facial swelling, and voice change. Eyes: Negative for photophobia and visual disturbance. Respiratory: Negative for apnea, cough, chest tightness and shortness of breath. Cardiovascular: Negative for chest pain and palpitations. Gastrointestinal: Negative for dysphagia and early satiety. Genitourinary: Negative for difficulty urinating, dysuria, flank pain, frequency and hematuria. Musculoskeletal: Negative for new gait problem, joint swelling and myalgias. Skin: Negative for color change, pallor and rash. Endocrine: negative for tremors, temperature intolerance or polydipsia. Allergic/Immunologic: Negative for new environmental or food allergies. Neurological: Negative for dizziness, seizures, speech difficulty, numbness. Hematological: Negative for adenopathy. Psychiatric/Behavioral: Negative for agitation and confusion.       EXAM     BP (!) 132/91   Pulse 77   Temp 98 °F (36.7 °C)   Resp 16   Ht 5' 7\" (1.702 m)   Wt 232 lb (105.2 kg)   SpO2 98%   BMI 36.34 kg/m²     GEN: NAD, pleasant, obese  CVS: RRR  PULM: No respiratory distress  HEENT: PERRLA/EOMI; dentition appropriate, hearing appears within normal limits  NECK: Supple with trachea in midline, no masses  EXT: No lymphedema noted  ABD: soft/NT/obese   NEURO: No focal deficits, no obvious CN deficits  BACK: Bilateral latiss muscle intact  BREAST: Left larger than Right   R  Ptosis ndgndrndanddndend:nd nd2nd Palpable masses: No     Nipple retraction: No     Palpable axillary lymphadenopathy: No     SN-N: 40 cm     N-IMF: 15.2 cm     Breast width: 16.4 cm         L  Ptosis thgthrthathdtheth:th th4th Palpable masses: No     Nipple retraction: No     Palpable axillary lymphadenopathy: No     SN-N: 42.8 cm     N-IMF: 15.8 cm     Breast width: 16.8 cm      Estimated Reduction Volume (Schnur scale): 850 grams (each)    RADIOLOGY: None    IMP: 28 y.o. female with symptomatic macromastia and panniculitis  PLAN:Would benefit from breast reduction and concomitant abdominoplasty with rectus plication. Will submit to insurance. She understands that she will need to have her BMI ~ 32-34 if she would like to proceed with abdominoplasty at the same time. A discussion regarding surgical options including: reduction mammaplasty and abdominoplasty was performed with the patient. Her symptoms of macromastia were discussed in detail and that surgical intervention is focused primarily on relieving upper torso complaints. Clinical photos were obtained. Additionally,discussion regarding the risks including, but not limited to: bleeding (potentially requiring transfusion or reoperation), infection, seroma, reoperation, poor cosmetic outcome, scarring, revisional surgery, nipple loss/complication, umbilical loss, nipple malposition, diminished sensation, inability to breastfeed, VTE (DVT/PE), and death was performed. All questions were answered in a satisfactory manner.      Shiloh Patel MD  [address] [place] [address] [facility]  [phone]  09/29/21

## 2021-09-30 ENCOUNTER — TELEPHONE (OUTPATIENT)
Dept: SURGERY | Age: 31
End: 2021-09-30

## 2021-09-30 NOTE — TELEPHONE ENCOUNTER
The patient was in the office to see Dr. Cali Santos yesterday. PLAN:Would benefit from breast reduction and concomitant abdominoplasty with rectus plication. Will submit to insurance. She understands that she will need to have her BMI ~ 32-34 if she would like to proceed with abdominoplasty at the same time. I received a surgery letter. Needs BMI 32-34 prior to scheduling abdominioplasty. I spoke with the patient at the home number listed. We discussed the needed medical records. She was provided our fax number and asked that the records be sent attention to me. The patient was verbally provided the cost for an abdominoplasty of $6970.00. I will leave this phone note open.

## 2021-10-12 NOTE — TELEPHONE ENCOUNTER
I lmom for the patient at the home number listed to follow up on the requested medical records. I requested a call back. I will leave this phone note.

## 2021-10-28 ENCOUNTER — TELEPHONE (OUTPATIENT)
Dept: ORTHOPEDIC SURGERY | Age: 31
End: 2021-10-28

## 2021-10-28 NOTE — TELEPHONE ENCOUNTER
FAXED [facility] ([facility])  [address] @ [phone] PER PATIENT SIGNED RELEASE

## 2021-10-28 NOTE — TELEPHONE ENCOUNTER
I received medical records from [facility]. I am still waiting on records from PT. I sent the patient a my chart message. I will leave this phone note open.

## 2021-12-07 NOTE — TELEPHONE ENCOUNTER
I lmom for patient at the home number listed. I requested a call back to discuss the needed physical therapy records.      I will leave this phone note open.

## 2022-01-02 ENCOUNTER — TELEMEDICINE (OUTPATIENT)
Dept: PRIMARY CARE CLINIC | Age: 32
End: 2022-01-02
Payer: COMMERCIAL

## 2022-01-02 DIAGNOSIS — B97.89 VIRAL RESPIRATORY ILLNESS: Primary | ICD-10-CM

## 2022-01-02 DIAGNOSIS — J98.8 VIRAL RESPIRATORY ILLNESS: Primary | ICD-10-CM

## 2022-01-02 PROCEDURE — 99422 OL DIG E/M SVC 11-20 MIN: CPT | Performed by: NURSE PRACTITIONER

## 2022-01-02 ASSESSMENT — ENCOUNTER SYMPTOMS
SORE THROAT: 1
DIARRHEA: 1
RHINORRHEA: 1
SHORTNESS OF BREATH: 1

## 2022-01-02 NOTE — PROGRESS NOTES
2022    TELEHEALTH EVALUATION -- Audio/Visual (During [place] [address] public health emergency)    HPI:  3 days ago her illness came on quickly, her son had direct exposure and got sick one day prior to her. She had body aches and sore throat. She had fever of 101 and used tylenol, but has not had one since. She started congestion today in head and nose. Her chest was slightly tight. She feels slightly short of breath on exertion, but resolves with rest. She is fatigued and does feel ill. She has had a headache since it started. Tylenol gives little relief. She has tried cold and flu medication. Advised her to go to ED if she cannot resolve SOB with rest. Call PCP with any concerns this week. Will order COVID test for her to get tomorrow. Mook Parham (:  1990) has requested an audio/video evaluation for the following concern(s):    Concern for COVID    Review of Systems   Constitutional: Positive for fatigue. HENT: Positive for congestion, ear pain, rhinorrhea and sore throat. Respiratory: Positive for shortness of breath. Mild SOB on exertion   Gastrointestinal: Positive for diarrhea. Prior to Visit Medications    Medication Sig Taking? Authorizing Provider   traZODone (DESYREL) 50 MG tablet Take 1 tablet by mouth nightly as needed for Sleep  LAURENCE Ross   Multiple Vitamins-Minerals (BARIATRIC FUSION PO) Take by mouth  Historical Provider, MD   levonorgestrel (MIRENA) IUD 52 mg 1 each by Intrauterine route once  Historical Provider, MD       Social History     Tobacco Use    Smoking status: Never Smoker    Smokeless tobacco: Never Used   Vaping Use    Vaping Use: Not on file   Substance Use Topics    Alcohol use: Not Currently     Comment: 2 x/ yr    Drug use:  No            PHYSICAL EXAMINATION:  [ INSTRUCTIONS:  \"[x]\" Indicates a positive item  \"[]\" Indicates a negative item  -- DELETE ALL ITEMS NOT EXAMINED]  Vital Signs: (As obtained by patient/caregiver or Progress Note      RED RULE APPLIED     Service Provided:    Met with patient for a Phone Session for 53 minutes. Verbal consent to proceed was received. Location and  were verified. Patient responded \"no\" to all travel and health screening questions.       Subjective:      Patient stated symptoms have fluctuated, denies SI. Continued discussion regarding her daughter reaching out more lately. Patient is cautious about her daughter's motives, but stated finding herself giving more information about self in the hopes that she can have a relationship with her grandchildren whom she has not met. Discussed that interactions with daughter continue to trigger thoughts of past hurt. Patient also expresses feeling stressed since her  reportedly is not on board since finding out about her reunification with her daughter. Discussed the use of coping skills for times of increased symptoms.This Psychologist utilized a insight-oriented approach to assist the patient in gaining an understanding into her own motivations, perceptions, behavior choices, thoughts/feelings in order to increase personal power.sight oriented approach to     Objective:    Behavior  ?cooperative  Speech  ?normal rate/volume     Mood:  ?euthymic  Oriented (person, place, time, situation):  ?oriented X4  Alertness:  ?alert  Memory:  ?short term intact  Attention:  ?adequate  Thought Processes  ?no evidence of thought disorder  Suicidal ideation:      ?Denies  If Active: Plan N N/A      Homicidal ideation:      Denies  If Active: Plan N N/A          Assessment:  Patient participated fully in session     Diagnosis:  Recurrent Depressive Disorder      Plan:     1. Individual sessions.  2. Follow up in 2 week(s).   3. Patient will go to the nearest emergency room or call  if ever having feelings of suicidality or if believed to a threat to self or others. Patient will inform Dr. Oglesby or other support if feeling unsafe.      Thank you for the  opportunity to participate in the care of this patient. Please feel free to contact me at [phone] should you have any questions about my recommendations.        Electronically Signed by: Jakob Oglesby PSYD, CITLALLI  5/4/2020   Licensed Clinical Psychologist   Certified [place] [address]  Phone: [phone]   practitioner observation)    Blood pressure-  Heart rate-    Respiratory rate-    Temperature-  Pulse oximetry-     Constitutional: [x] Appears well-developed and well-nourished [] No apparent distress      [] Abnormal-   Mental status  [x] Alert and awake  [] Oriented to person/place/time []Able to follow commands      Eyes:  EOM    [x]  Normal  [] Abnormal-  Sclera  [x]  Normal  [] Abnormal -         Discharge [x]  None visible  [] Abnormal -    HENT:   [x] Normocephalic, atraumatic. [] Abnormal   [x] Mouth/Throat: Mucous membranes are moist.     External Ears [x] Normal  [] Abnormal-     Neck: [x] No visualized mass     Pulmonary/Chest: [x] Respiratory effort normal.  [] No visualized signs of difficulty breathing or respiratory distress        [] Abnormal-      Musculoskeletal:   [] Normal gait with no signs of ataxia         [x] Normal range of motion of neck        [] Abnormal-       Neurological:        [x] No Facial Asymmetry (Cranial nerve 7 motor function) (limited exam to video visit)          [] No gaze palsy        [] Abnormal-         Skin:        [x] No significant exanthematous lesions or discoloration noted on facial skin         [] Abnormal-            Psychiatric:       [x] Normal Affect [] No Hallucinations        [] Abnormal-     Other pertinent observable physical exam findings-     ASSESSMENT/PLAN:  1.  Viral respiratory illness  Viral URI/ Bronchitis Symptom Management with over the counter treatments:  Symptoms may last up to 14 days but should improve significantly by day 7    Sore Throat:  Ice chips and warm drinks, throat lozenges, Ibuprofen as directed for dosing  Throat Coat Tea (box of bags in organic aisle at grocery)    Sinus Congestion:  May last up to 14 days  Saline Nasal Spray  Spruce Pot Rinses with Saline  Nasal Steroid Spray (Nasonex, Flonase, Rhinocort- all OTC) after sinus rinsing twice daily  Sudafed 120mg twice daily if not hypertensive  Coricidan HBP if hypertensive    Runny Nose:  Afrin nasal spray 3-5 days max  Hubbell Pot irrigation  Steroid Nasal Spray  Benadryl at bedtime  OTC antihistamine non drowsy- Allegra, Zyrtec, Claritin    Cough: May last up to 6 weeks  Cough suppressants- Delsym, \"DM\" containing products  Throat lozenges  Guaifenesin (Mucinex) for thick secretions, dink plenty of fluids with this    Call the office for:  Fever over 101  Symptoms worsen or fail to improve with OTC medications after 10 days  You have bloody phlegm or bloody sinus drainage  Change or worsening of symptoms  Go to ED if shortness of breath worsens  - Covid-19 Ambulatory; Future      Return if symptoms worsen or fail to improve. Anjelica Medellin, was evaluated through a synchronous (real-time) audio-video encounter. The patient (or guardian if applicable) is aware that this is a billable service. Verbal consent to proceed has been obtained within the past 12 months. The visit was conducted pursuant to the emergency declaration under the [address] [place] [address] authority and the [employer] General Act. Patient identification was verified, and a caregiver was present when appropriate. The patient was located in a state where the provider was credentialed to provide care. Total time spent on this encounter: 15 min    --PRISCILA Myers on 1/2/2022 at 5:28 PM    An electronic signature was used to authenticate this note.

## 2022-01-03 ENCOUNTER — NURSE ONLY (OUTPATIENT)
Dept: FAMILY MEDICINE CLINIC | Age: 32
End: 2022-01-03

## 2022-01-03 DIAGNOSIS — B97.89 VIRAL RESPIRATORY ILLNESS: ICD-10-CM

## 2022-01-03 DIAGNOSIS — J98.8 VIRAL RESPIRATORY ILLNESS: ICD-10-CM

## 2022-01-04 ENCOUNTER — PATIENT MESSAGE (OUTPATIENT)
Dept: FAMILY MEDICINE CLINIC | Age: 32
End: 2022-01-04

## 2022-01-04 LAB — SARS-COV-2: DETECTED

## 2022-01-04 NOTE — TELEPHONE ENCOUNTER
From: Pavithra Mark  To: Sindymichael Corbett  Sent: 1/4/2022 9:22 AM EST  Subject: Question regarding HPKEI-09    Is there anyway I can get a letter stating I have tested positive. I have missed several days of work because I haven't felt well and I have used a lot of my unpaid time. They told me if I had a positive result that I would be paid for my time out. So im just trying to get that handled.  I appreciate your help

## 2022-01-04 NOTE — LETTER
[address]  [facility] [address]  Phone: [phone]  Fax: [phone]    Cherri Coto        January 4, 2022     Patient: Polo Ambriz   YOB: 1990           To Whom It May Concern:     Chase Schmitz started having covid symptoms on 12/29/21 and tested positive for covid on 01/03/2022. She has been instructed to remain in isolation for 14 days and may return to work on 01/12/2022. If you have any questions or concerns, please don't hesitate to call.     Sincerely,          LAURENCE Coto

## 2022-01-06 RX ORDER — BUTALBITAL, ACETAMINOPHEN AND CAFFEINE 50; 325; 40 MG/1; MG/1; MG/1
1 TABLET ORAL EVERY 4 HOURS PRN
Qty: 180 TABLET | Refills: 3 | Status: SHIPPED | OUTPATIENT
Start: 2022-01-06

## 2022-01-11 NOTE — TELEPHONE ENCOUNTER
I spoke with the patient at the home number listed to follow up on the needed medical records. She stated that she saw a chiropractor over 10 years ago and is not sure how she would go about getting those medical records. I did receive 1 office visit note from PT, which is the only note that the patient has. I lmom for patient at the home number listed. I requested a call back to determine what the primary insurance. I will leave this phone note open.

## 2022-01-13 NOTE — TELEPHONE ENCOUNTER
I faxed clinicals, pictures and a [employer] Pre-Service / Prior Authorization Review Request form today. I will scan the information that I faxed and the fax success into Epic udner the media tab, but I am not able to scan colored pictures    I sent the patient a my chart message to provide an insurance update. I will leave this phone note open.

## 2022-01-28 NOTE — TELEPHONE ENCOUNTER
[facility] did not receive the original information that I faxed on 1-. I refaxed clinicals, pictures and a [facility] Pre-Service / Prior Authorization Review Request form today. I will scan the fax cover sheet and fax success into Epic under the media tab, since the original clinicals are already under the media tab.      I lmom for the patient at the home number listed to provide an insurance update.      I will leave this phone note open.

## 2022-02-01 NOTE — TELEPHONE ENCOUNTER
I received a fax from [employer] dated 1-. I will scan the fax into Epic under the media tab. APPROVED  Reference Number [id number]  Code 900 [facility]  Date Range 1- to 7-    I spoke with the patient at the home number listed to discuss the insurance APPROVAL and how she would like to move forward. The patient stated that she would like to lose more weight and then schedule surgery. The patient is scheduled to see Dr. Dylon Laguna in office 5- to check weight loss and finalize a surgery plan. I will leave this phone note open.

## 2022-03-18 ENCOUNTER — TELEMEDICINE (OUTPATIENT)
Dept: PRIMARY CARE CLINIC | Age: 32
End: 2022-03-18
Payer: COMMERCIAL

## 2022-03-18 DIAGNOSIS — J01.10 ACUTE NON-RECURRENT FRONTAL SINUSITIS: Primary | ICD-10-CM

## 2022-03-18 DIAGNOSIS — R09.81 NASAL CONGESTION: ICD-10-CM

## 2022-03-18 DIAGNOSIS — R05.9 COUGH: ICD-10-CM

## 2022-03-18 PROCEDURE — G8427 DOCREV CUR MEDS BY ELIG CLIN: HCPCS | Performed by: NURSE PRACTITIONER

## 2022-03-18 PROCEDURE — 99213 OFFICE O/P EST LOW 20 MIN: CPT | Performed by: NURSE PRACTITIONER

## 2022-03-18 RX ORDER — BENZONATATE 200 MG/1
200 CAPSULE ORAL 3 TIMES DAILY PRN
Qty: 30 CAPSULE | Refills: 0 | Status: SHIPPED | OUTPATIENT
Start: 2022-03-18 | End: 2022-03-28

## 2022-03-18 RX ORDER — FLUTICASONE PROPIONATE 50 MCG
2 SPRAY, SUSPENSION (ML) NASAL DAILY
Qty: 16 G | Refills: 0 | Status: SHIPPED | OUTPATIENT
Start: 2022-03-18 | End: 2022-04-11

## 2022-03-18 RX ORDER — AMOXICILLIN 875 MG/1
875 TABLET, COATED ORAL 2 TIMES DAILY
Qty: 20 TABLET | Refills: 0 | Status: SHIPPED | OUTPATIENT
Start: 2022-03-18 | End: 2022-03-28

## 2022-03-18 ASSESSMENT — ENCOUNTER SYMPTOMS
SINUS PRESSURE: 1
SINUS PAIN: 1
COUGH: 1
SORE THROAT: 1

## 2022-03-18 NOTE — PROGRESS NOTES
Brittney Ramey (:  1990) is a Established patient, here for evaluation of the following:NP cough, nasal congestion, frontal pain and pressure, fatigue, HA, fever 100.0 yesterday, diarrhea that is resolving today, bilateral ear pain, sore throat X 2 days    Assessment & Plan   Below is the assessment and plan developed based on review of pertinent history, physical exam, labs, studies, and medications. 1. Acute non-recurrent frontal sinusitis  Problem:  -     amoxicillin (AMOXIL) 875 MG tablet; Take 1 tablet by mouth 2 times daily for 10 days, Disp-20 tablet, [id number] to not start ABT unless symptoms worsen. She verbalized understanding. Patient instructions in my chart. Reviewed with the patient during visit  Push fluids    2. Cough  Problem:  -     benzonatate (TESSALON) 200 MG capsule; Take 1 capsule by mouth 3 times daily as needed for Cough, Disp-30 capsule, R-0Normal    3. Nasal congestion  Problem:  -     fluticasone (FLONASE) 50 MCG/ACT nasal spray; 2 sprays by Each Nostril route daily, Disp-16 g, R-0Normal      Subjective   HPI  Review of Systems   Constitutional: Positive for fatigue and fever. HENT: Positive for congestion, ear pain, sinus pressure, sinus pain and sore throat. Respiratory: Positive for cough. Neurological: Positive for headaches. All other systems reviewed and are negative.          Objective   Patient-Reported Vitals  Patient-Reported Temperature: 100.0 yesterday controlled with Tylenol  Patient-Reported Weight: 235 lbs       Physical Exam  [INSTRUCTIONS:  \"[x]\" Indicates a positive item  \"[]\" Indicates a negative item  -- DELETE ALL ITEMS NOT EXAMINED]    Constitutional: [x] Appears well-developed and well-nourished [x] No apparent distress      [] Abnormal -     Mental status: [x] Alert and awake  [x] Oriented to person/place/time [x] Able to follow commands    [] Abnormal -     Eyes:   EOM    [x]  Normal    [] Abnormal -   Sclera  [x]  Normal    [] Abnormal -          Discharge [x]  None visible   [] Abnormal -     HENT: [x] Normocephalic, atraumatic  [] Abnormal -   [] Mouth/Throat: Mucous membranes are moist    External Ears [x] Normal  [] Abnormal -    Neck: [x] No visualized mass [] Abnormal -     Pulmonary/Chest: [x] Respiratory effort normal   [x] No visualized signs of difficulty breathing or respiratory distress        [] Abnormal -      Musculoskeletal:   [] Normal gait with no signs of ataxia         [x] Normal range of motion of neck        [] Abnormal -     Neurological:        [x] No Facial Asymmetry (Cranial nerve 7 motor function) (limited exam due to video visit)          [x] No gaze palsy        [] Abnormal -          Skin:        [x] No significant exanthematous lesions or discoloration noted on facial skin         [] Abnormal -            Psychiatric:       [x] Normal Affect [] Abnormal -        [x] No Hallucinations          On this date 3/18/2022 I have spent 20 minutes reviewing previous notes, test results and face to face (virtual) with the patient discussing the diagnosis and importance of compliance with the treatment plan as well as documenting on the day of the visit. Verner Stager, was evaluated through a synchronous (real-time) audio-video encounter. The patient (or guardian if applicable) is aware that this is a billable service, which includes applicable co-pays. This Virtual Visit was conducted with patient's (and/or legal guardian's) consent. The visit was conducted pursuant to the emergency declaration under the [address] [place] [address], [address] [place] [address] and the [employer] and [employer] General Act. Patient identification was verified, and a caregiver was present when appropriate. The patient was located at home in a state where the provider was licensed to provide care.        --Edel Delgado, APRN - CNP

## 2022-03-18 NOTE — PATIENT INSTRUCTIONS
Patient Education        Sinusitis: Care Instructions  Your Care Instructions     Sinusitis is an infection of the lining of the sinus cavities in your head. Sinusitis often follows a cold. It causes pain and pressure in your head and face. In most cases, sinusitis gets better on its own in 1 to 2 weeks. But some mild symptoms may last for several weeks. Sometimes antibiotics are needed. Follow-up care is a key part of your treatment and safety. Be sure to make and go to all appointments, and call your doctor if you are having problems. It's also a good idea to know your test results and keep a list of the medicines you take. How can you care for yourself at home? · Take an over-the-counter pain medicine, such as acetaminophen (Tylenol), ibuprofen (Advil, Motrin), or naproxen (Aleve). Read and follow all instructions on the label. · If the doctor prescribed antibiotics, take them as directed. Do not stop taking them just because you feel better. You need to take the full course of antibiotics. · Be careful when taking over-the-counter cold or flu medicines and Tylenol at the same time. Many of these medicines have acetaminophen, which is Tylenol. Read the labels to make sure that you are not taking more than the recommended dose. Too much acetaminophen (Tylenol) can be harmful. · Breathe warm, moist air from a steamy shower, a hot bath, or a sink filled with hot water. Avoid cold, dry air. Using a humidifier in your home may help. Follow the directions for cleaning the machine. · Use saline (saltwater) nasal washes. This can help keep your nasal passages open and wash out mucus and bacteria. You can buy saline nose drops at a grocery store or drugstore. Or you can make your own at home by adding 1 teaspoon of salt and 1 teaspoon of baking soda to 2 cups of distilled water. If you make your own, fill a bulb syringe with the solution, insert the tip into your nostril, and squeeze gently. Pa Pinks your nose.   · Put a hot, wet towel or a warm gel pack on your face 3 or 4 times a day for 5 to 10 minutes each time. · Try a decongestant nasal spray like oxymetazoline (Afrin). Do not use it for more than 3 days in a row. Using it for more than 3 days can make your congestion worse. When should you call for help? Call your doctor now or seek immediate medical care if:    · You have new or worse swelling or redness in your face or around your eyes.     · You have a new or higher fever. Watch closely for changes in your health, and be sure to contact your doctor if:    · You have new or worse facial pain.     · The mucus from your nose becomes thicker (like pus) or has new blood in it.     · You are not getting better as expected. Where can you learn more? Go to https://[employer].idiag. org and sign in to your [employer] account. Enter S922 in the [employer] box to learn more about \"Sinusitis: Care Instructions. \"     If you do not have an account, please click on the \"Sign Up Now\" link. Current as of: September 8, 2021               Content Version: 13.1  © 9509-8028 Healthwise, Incorporated. Care instructions adapted under license by [facility] ([facility]). If you have questions about a medical condition or this instruction, always ask your healthcare professional. [address]  any warranty or liability for your use of this information.

## 2022-03-23 ENCOUNTER — PATIENT MESSAGE (OUTPATIENT)
Dept: FAMILY MEDICINE CLINIC | Age: 32
End: 2022-03-23

## 2022-03-23 RX ORDER — RIZATRIPTAN BENZOATE 10 MG/1
10 TABLET ORAL
Qty: 9 TABLET | Refills: 5 | Status: SHIPPED | OUTPATIENT
Start: 2022-03-23 | End: 2022-07-11 | Stop reason: ALTCHOICE

## 2022-03-23 NOTE — TELEPHONE ENCOUNTER
From: Zee Porter  To: Dorothy Ramirez  Sent: 3/23/2022 12:14 PM EDT  Subject: Returning to work    I recently had a visit because I wasn't feeling well. The symptoms I was feeling were considered covid symptoms at my work so I went on covid leave until I was seen at the office. Talking to the doctor I was more treated for sinus infection symptoms. Some of my symptoms have gone away. I still have this terrible headache. And some congestion. I did take a rapid at home covid test and it said negative. I needed to take this for work to be able to return. My job needs something from my doctor stating I was sick and it's not covid for me to be able to return. But again I'm having this terrible headache and nothing is working for it.

## 2022-03-23 NOTE — LETTER
[address]  [facility] [address]  Phone: [phone]  Fax: [phone]    Cherri Roth        March 23, 2022     Patient: Tanika Almendarez   YOB: 1990   Date of Visit: 3/23/2022       To Whom It May Concern: It is my medical opinion that Puma Zuñiga may return to full duty immediately with no restrictions. She has been treated for acute bacterial sinusitis. If you have any questions or concerns, please don't hesitate to call.     Sincerely,          LAURENCE Roth

## 2022-04-11 DIAGNOSIS — R09.81 NASAL CONGESTION: ICD-10-CM

## 2022-04-11 RX ORDER — FLUTICASONE PROPIONATE 50 MCG
SPRAY, SUSPENSION (ML) NASAL
Qty: 16 G | Refills: 1 | Status: SHIPPED | OUTPATIENT
Start: 2022-04-11

## 2022-05-05 ENCOUNTER — OFFICE VISIT (OUTPATIENT)
Dept: FAMILY MEDICINE CLINIC | Age: 32
End: 2022-05-05
Payer: COMMERCIAL

## 2022-05-05 VITALS
WEIGHT: 244 LBS | HEART RATE: 76 BPM | BODY MASS INDEX: 38.22 KG/M2 | SYSTOLIC BLOOD PRESSURE: 122 MMHG | OXYGEN SATURATION: 98 % | DIASTOLIC BLOOD PRESSURE: 74 MMHG

## 2022-05-05 DIAGNOSIS — M65.4 TENDINITIS, DE QUERVAIN'S: Primary | ICD-10-CM

## 2022-05-05 DIAGNOSIS — E66.9 OBESITY (BMI 35.0-39.9 WITHOUT COMORBIDITY): ICD-10-CM

## 2022-05-05 PROBLEM — E66.01 MORBID OBESITY WITH BMI OF 40.0-44.9, ADULT (HCC): Status: RESOLVED | Noted: 2020-08-26 | Resolved: 2022-05-05

## 2022-05-05 PROCEDURE — 1036F TOBACCO NON-USER: CPT | Performed by: PHYSICIAN ASSISTANT

## 2022-05-05 PROCEDURE — 99213 OFFICE O/P EST LOW 20 MIN: CPT | Performed by: PHYSICIAN ASSISTANT

## 2022-05-05 PROCEDURE — G8427 DOCREV CUR MEDS BY ELIG CLIN: HCPCS | Performed by: PHYSICIAN ASSISTANT

## 2022-05-05 PROCEDURE — G8417 CALC BMI ABV UP PARAM F/U: HCPCS | Performed by: PHYSICIAN ASSISTANT

## 2022-05-05 RX ORDER — PREDNISONE 10 MG/1
TABLET ORAL
Qty: 30 TABLET | Refills: 0 | Status: SHIPPED | OUTPATIENT
Start: 2022-05-05 | End: 2022-05-17

## 2022-05-05 ASSESSMENT — PATIENT HEALTH QUESTIONNAIRE - PHQ9
SUM OF ALL RESPONSES TO PHQ QUESTIONS 1-9: 1
2. FEELING DOWN, DEPRESSED OR HOPELESS: 0
SUM OF ALL RESPONSES TO PHQ QUESTIONS 1-9: 1
1. LITTLE INTEREST OR PLEASURE IN DOING THINGS: 1
SUM OF ALL RESPONSES TO PHQ9 QUESTIONS 1 & 2: 1

## 2022-05-05 ASSESSMENT — ENCOUNTER SYMPTOMS: COLOR CHANGE: 0

## 2022-05-05 NOTE — PROGRESS NOTES
Katia Leiva (:  1990) is a 28 y.o. female,Established patient, here for evaluation of the following chief complaint(s):  Wrist Pain (left, x's 2 months, Getting worse, no known injury )         ASSESSMENT/PLAN:  1. Tendinitis, de Quervain's  -     predniSONE (DELTASONE) 10 MG tablet; Take 4 tablets by mouth daily for 3 days, THEN 3 tablets daily for 3 days, THEN 2 tablets daily for 3 days, THEN 1 tablet daily for 3 days. , Disp-30 tablet, R-0Normal  -    Purchas a thumb spica splint to wear at night and improve morning symptoms. If no improvement after treatment with a steroid consider referral to hand specialist      Return if symptoms worsen or fail to improve. Subjective   SUBJECTIVE/OBJECTIVE:  HPI   The pt is here for assessment of left wrist pain  This is her dominate hand  Currently works at [employer] in a physical position  Symptoms started two months ago, getting progressively worse  Location: dorsal wrist at base of thumb with pain into the thumb  Swollen at the end of the day  Wakes up with pain in the morning  Associated clicking sensation  Taking otc medication for relief  No known injury    Review of Systems   Constitutional: Negative for activity change, appetite change, fever and unexpected weight change. Musculoskeletal: Positive for arthralgias and joint swelling. Skin: Negative for color change. Neurological: Positive for weakness and numbness. Objective   Physical Exam  Vitals reviewed. Constitutional:       Appearance: She is obese. Cardiovascular:      Pulses:           Radial pulses are 2+ on the left side. Musculoskeletal:      Left hand: Tenderness present. No swelling or bony tenderness. Normal range of motion. Normal strength. Normal sensation. There is no disruption of two-point discrimination. Normal capillary refill. Skin:     Findings: No bruising, signs of injury, lesion or rash. Neurological:      Mental Status: She is alert. An electronic signature was used to authenticate this note.     --Ashely Mancini PA

## 2022-05-25 ENCOUNTER — OFFICE VISIT (OUTPATIENT)
Dept: SURGERY | Age: 32
End: 2022-05-25
Payer: COMMERCIAL

## 2022-05-25 VITALS
OXYGEN SATURATION: 100 % | SYSTOLIC BLOOD PRESSURE: 115 MMHG | WEIGHT: 241 LBS | HEIGHT: 67 IN | HEART RATE: 93 BPM | DIASTOLIC BLOOD PRESSURE: 71 MMHG | TEMPERATURE: 97.7 F | BODY MASS INDEX: 37.83 KG/M2 | RESPIRATION RATE: 16 BRPM

## 2022-05-25 DIAGNOSIS — N62 MACROMASTIA: Primary | ICD-10-CM

## 2022-05-25 DIAGNOSIS — M79.3 PANNICULITIS: ICD-10-CM

## 2022-05-25 PROCEDURE — G8427 DOCREV CUR MEDS BY ELIG CLIN: HCPCS | Performed by: SURGERY

## 2022-05-25 PROCEDURE — G8417 CALC BMI ABV UP PARAM F/U: HCPCS | Performed by: SURGERY

## 2022-05-25 PROCEDURE — 1036F TOBACCO NON-USER: CPT | Performed by: SURGERY

## 2022-05-25 PROCEDURE — 99213 OFFICE O/P EST LOW 20 MIN: CPT | Performed by: SURGERY

## 2022-05-25 NOTE — PROGRESS NOTES
[facility]    CC: Symptomatic macromastia    REFERRING PHYSICIAN: María Elena Kaur    HPI: This is a 28 y.o.  female who presents to clinic with desire for breast reduction. She has had symptoms of macromastia for years, however over the past few months, it has worsened to the point that it is limiting her activity. She was referred to bariatrics and she underwent a sleeve gastrectomy with Dr. Rick Bosch () losing a total of 90 lbs. Since that time, she notes that her symptoms of macromastia have persisted and additionally, she notes that she is having problems with excess skin of her abdomen as well as her breasts. She notes that her breast rashes have worsened with the weight loss. She feels that she has lost some volume, but her symptoms have persisted. Since her last evaluation, she has gained approximately 9 lbs since her last evaluation. She states that she has been having stress at home. Her pertinent breast history include the following:    Last Mammogram: None    Current bra size: 42F  Desired bra size: As small as possible \"proportionate\"  Pregnancies/miscarriages: 2 / 0  Breast feeding: no future plans    Breast Symptoms:    Macromastia Symptoms:  Upper back pain: Yes      Bra strap grooves: Yes      Wears supportive bras (>1 yr): Yes      Tried conservative measures (PT, MDs,etc): Yes      Intertrigo: Yes      Head/neck pain: Yes      Headaches: Yes      Paresthesias of hands/fingers: Yes      PMHx: Migranes  PSHx:   Past Surgical History:   Procedure Laterality Date    CHOLECYSTECTOMY      SLEEVE GASTRECTOMY N/A 9/15/2020    ROBOTIC ASSISTED LAPAROSCOPIC VERTICAL SLEEVE GASTRECTOMY, HIATAL HERNIA REPAIR performed by Brandie Go DO at [address] [facility] N/A 2020    EGD BIOPSY performed by Brandie Go DO at [address] [place] [address]: No Known Allergies  SOCIAL: No tobacco, no ETOH, no IVD  FHx: Past history of breast CA:  Yes (paternal grandmother and another paternal member)   Past family members with breast reduction: No   Past family members with breast augmentation:No    Meds:   Current Outpatient Medications   Medication Sig Dispense Refill    fluticasone (FLONASE) 50 MCG/ACT nasal spray SPRAY 2 SPRAYS INTO EACH NOSTRIL EVERY DAY 16 g 1    rizatriptan (MAXALT) 10 MG tablet Take 1 tablet by mouth once as needed for Migraine May repeat in 2 hours if needed 9 tablet 5    butalbital-acetaminophen-caffeine (FIORICET, ESGIC) -40 MG per tablet Take 1 tablet by mouth every 4 hours as needed for Headaches 180 tablet 3    Multiple Vitamins-Minerals (BARIATRIC FUSION PO) Take by mouth      levonorgestrel (MIRENA) IUD 52 mg 1 each by Intrauterine route once       No current facility-administered medications for this visit. ROS   Constitutional: Negative for chills and fever. HENT: Negative for congestion, facial swelling, and voice change. Eyes: Negative for photophobia and visual disturbance. Respiratory: Negative for apnea, cough, chest tightness and shortness of breath. Cardiovascular: Negative for chest pain and palpitations. Gastrointestinal: Negative for dysphagia and early satiety. Genitourinary: Negative for difficulty urinating, dysuria, flank pain, frequency and hematuria. Musculoskeletal: Negative for new gait problem, joint swelling and myalgias. Skin: Negative for color change, pallor and rash. Endocrine: negative for tremors, temperature intolerance or polydipsia. Allergic/Immunologic: Negative for new environmental or food allergies. Neurological: Negative for dizziness, seizures, speech difficulty, numbness. Hematological: Negative for adenopathy. Psychiatric/Behavioral: Negative for agitation and confusion.       EXAM     /71   Pulse 93   Temp 97.7 °F (36.5 °C)   Resp 16   Ht 5' 7\" (1.702 m)   Wt 241 lb (109.3 kg)   SpO2 100%   BMI 37.75 kg/m²     GEN: NAD, pleasant, obese  CVS: RRR  PULM: No respiratory distress  HEENT: PERRLA/EOMI; dentition appropriate, hearing appears within normal limits  NECK: Supple with trachea in midline, no masses  EXT: No lymphedema noted  ABD: soft/NT/obese   NEURO: No focal deficits, no obvious CN deficits  BACK: Bilateral latiss muscle intact  BREAST: Left larger than Right   R  Ptosis ndgndrndanddndend:nd nd2nd Palpable masses: No     Nipple retraction: No     Palpable axillary lymphadenopathy: No     SN-N: 40 cm     N-IMF: 15.2 cm     Breast width: 16.4 cm         L  Ptosis ndgndrndanddndend:nd nd2nd Palpable masses: No     Nipple retraction: No     Palpable axillary lymphadenopathy: No     SN-N: 42.8 cm     N-IMF: 15.8 cm     Breast width: 16.8 cm      Estimated Reduction Volume (Schnur scale): 850 grams (each)    RADIOLOGY: None    IMP: 28 y.o. female with symptomatic macromastia and panniculitis  PLAN:Would benefit from breast reduction and concomitant panniculectomy. However, needs to decrease her BMI ~ 34-35. She is going to work with Dr. Tin Yip team to improve this and then will return in a few months for evaluation. If improved, will submit to insurance and schedule. A discussion regarding surgical options including: reduction mammaplasty and abdominoplasty was performed with the patient. Her symptoms of macromastia were discussed in detail and that surgical intervention is focused primarily on relieving upper torso complaints. Clinical photos were obtained. Additionally,discussion regarding the risks including, but not limited to: bleeding (potentially requiring transfusion or reoperation), infection, seroma, reoperation, poor cosmetic outcome, scarring, revisional surgery, nipple loss/complication, umbilical loss, nipple malposition, diminished sensation, inability to breastfeed, VTE (DVT/PE), and death was performed. All questions were answered in a satisfactory manner.      Libertad Paz MD  [address] [place] [address] [address] Reconstructive Surgery  [phone]  05/25/22

## 2022-05-26 NOTE — TELEPHONE ENCOUNTER
The patient was in the office to see Dr. Lorrie Hunt yesterday. PLAN:Would benefit from breast reduction and concomitant panniculectomy. However, needs to decrease her BMI ~ 34-35. She is going to work with Dr. Larissa Corea team to improve this and then will return in a few months for evaluation. If improved, will submit to insurance and schedule. The patient is scheduled to see Dr. Lorrie Hunt in office 7-. The patient is aware that we will need to start the insurance pre certification process again. I will close this phone note open.

## 2022-06-08 ENCOUNTER — OFFICE VISIT (OUTPATIENT)
Dept: BARIATRICS/WEIGHT MGMT | Age: 32
End: 2022-06-08
Payer: COMMERCIAL

## 2022-06-08 VITALS — BODY MASS INDEX: 38.14 KG/M2 | HEIGHT: 67 IN | WEIGHT: 243 LBS

## 2022-06-08 DIAGNOSIS — E66.01 SEVERE OBESITY (BMI 35.0-35.9 WITH COMORBIDITY) (HCC): Primary | ICD-10-CM

## 2022-06-08 DIAGNOSIS — Z98.84 STATUS POST LAPAROSCOPIC SLEEVE GASTRECTOMY: ICD-10-CM

## 2022-06-08 PROCEDURE — 99213 OFFICE O/P EST LOW 20 MIN: CPT | Performed by: SURGERY

## 2022-06-08 PROCEDURE — G8427 DOCREV CUR MEDS BY ELIG CLIN: HCPCS | Performed by: SURGERY

## 2022-06-08 PROCEDURE — 1036F TOBACCO NON-USER: CPT | Performed by: SURGERY

## 2022-06-08 PROCEDURE — G8417 CALC BMI ABV UP PARAM F/U: HCPCS | Performed by: SURGERY

## 2022-06-08 NOTE — PROGRESS NOTES
[facility]) Physicians   General & Laparoscopic Surgery  [employer]       HPI:     Patrick Gregory is a very pleasant 28 y.o. obese female , Body mass index is 38.06 kg/m². Bula Dayhoff And multiple medical problems who is presenting for bariatric follow up care. Patrick Gregory is s/p laparoscopic sleeve gastrectomy by me   Comes today to the clinic without any complaints. Patient denies any nausea, vomiting, fevers, chills, shortness of breath, chest pain, constipation or urinary symptoms. Denies any heartburn nor dysphagia. Patient is feeling very well, and is very active. Patient is very pleased with the weight loss and resolution of co-morbid conditions. Past Medical History:   Diagnosis Date    Acid reflux     Broken tooth     COVID-19 12/2020     Past Surgical History:   Procedure Laterality Date    CHOLECYSTECTOMY      SLEEVE GASTRECTOMY N/A 9/15/2020    ROBOTIC ASSISTED LAPAROSCOPIC VERTICAL SLEEVE GASTRECTOMY, HIATAL HERNIA REPAIR performed by Pallavi Ramos DO at [address] [place] [address] ENDOSCOPY N/A 6/8/2020    EGD BIOPSY performed by Pallavi Ramos DO at [facility] ENDOSCOPY     Family History   Problem Relation Age of Onset    Other Mother         hyperactive thyroid    Other Maternal Aunt         hyperactive thyroid    Heart Disease Maternal Grandmother     Hypertension Maternal Grandmother     Elevated Lipids Maternal Grandmother     Heart Disease Maternal Grandfather     Hypertension Maternal Grandfather     Elevated Lipids Maternal Grandfather     Breast Cancer Paternal Grandmother      Social History     Tobacco Use    Smoking status: Never Smoker    Smokeless tobacco: Never Used   Substance Use Topics    Alcohol use: Not Currently     Comment: 2 x/ yr     I counseled the patient on the importance of not smoking and risks of ETOH.    No Known Allergies  Vitals:    06/08/22 0902   Weight: 243 lb (110.2 kg)   Height: 5' 7\" (1.702 m)       Body mass index is 38.06 kg/m². Current Outpatient Medications:     fluticasone (FLONASE) 50 MCG/ACT nasal spray, SPRAY 2 SPRAYS INTO EACH NOSTRIL EVERY DAY, Disp: 16 g, Rfl: 1    butalbital-acetaminophen-caffeine (FIORICET, ESGIC) -40 MG per tablet, Take 1 tablet by mouth every 4 hours as needed for Headaches, Disp: 180 tablet, Rfl: 3    Multiple Vitamins-Minerals (BARIATRIC FUSION PO), Take by mouth, Disp: , Rfl:     levonorgestrel (MIRENA) IUD 52 mg, 1 each by Intrauterine route once, Disp: , Rfl:     rizatriptan (MAXALT) 10 MG tablet, Take 1 tablet by mouth once as needed for Migraine May repeat in 2 hours if needed, Disp: 9 tablet, Rfl: 5      Review of Systems - History obtained from the patient  General ROS: negative  Psychological ROS: negative  Hematological and Lymphatic ROS: negative  Endocrine ROS: negative  Respiratory ROS: negative  Cardiovascular ROS: negative  Gastrointestinal ROS:negative  Genito-Urinary ROS: negative  Musculoskeletal ROS: negative   Skin ROS: negative    Physical Exam   Vitals Reviewed   Constitutional: Patient is oriented to person, place, and time. Patient appears well-developed and well-nourished. Patient is active and cooperative. Non-toxic appearance. No distress. Neck: Trachea normal and normal range of motion. No JVD present. Pulmonary/Chest: Effort normal. No accessory muscle usage or stridor. No apnea. No respiratory distress. Cardiovascular: Normal rate and no JVD. Abdominal: Normal appearance. Patient exhibits no distension. Abdomen is soft, obese, non tender. Musculoskeletal: Normal range of motion. Patient exhibits no edema. Neurological: Patient is alert and oriented to person, place, and time. Patient has normal strength. GCS eye subscore is 4. GCS verbal subscore is 5. GCS motor subscore is 6. Skin: Skin is warm and dry. No abrasion and no rash noted. Patient is not diaphoretic. No cyanosis or erythema.    Psychiatric: Patient has a normal mood and affect. Speech is normal and behavior is normal. Cognition and memory are normal.         A/P     Marianna Leos is 28 y.o. female , now with Body mass index is 38.06 kg/m². s/p Sleeve gastrectomy, has lost gained 5# lbs since last visit, total of 64 lbs weight loss. The patient underwent dietary counseling with registered dietician. I have reviewed, discussed and agree with the dietary plan. Patient is trying hard to keep good dietary and behavior modifications. Patient is monitoring portion sizes, food choices and liquid calories. Patient is trying to exercise regularly. Patient pleased with the surgery outcomes. We discussed how her weight affects her overall health including:  Migdalia Bhandari was seen today for bariatrics post op follow up. Diagnoses and all orders for this visit:    Severe obesity (BMI 35.0-35.9 with comorbidity) ([facility] [address]    Status post laparoscopic sleeve gastrectomy       and importance of weight loss to alleviate those co morbid conditions. I encouraged the patient to continue exercise and keeping healthy eating habits. Also counseled the patient extensively on post surgery care. Total encounter time:  20 minutes including any number of the following: review of labs, imaging, provider notes, outside hospital records; performing examination/evaluation; counseling patient and family; ordering medications/tests; placing referrals and communication with referring physicians; coordination of care, and documentation in the EHR. RTC in 3 months  Diet and Exercise      Patient advised that its their responsibility to follow up for studies and/or labs ordered today.

## 2022-06-08 NOTE — PROGRESS NOTES
Dietary Assessment Note    Vitals:   Vitals:    06/08/22 0902   Weight: 243 lb (110.2 kg)   Height: 5' 7\" (1.702 m)    Patient gained 5.6 lbs since 6 week post op visit. Total Weight Loss: 64.8#    Labs reviewed: no recent studies available    Protein intake: Patient not tracking     Fluid intake: 48-64 oz/day - some juice    Multivitamin/mineral intake: Not consistent with MVI    Calcium intake: None    Other: None    Exercise: Yes. Physical job (8 miles daily) and tries to get in activity at home - inconsistent per pt (exercise video or gym in apartment complex)    Nutrition Assessment: 1 year 9 month post-op visit. Not tracking intakes currently. Pt works third shift 5x/week (about 5 hours sleep on a good day). 3:45pm cheese stick OR yogurt  6pm Maybe bring something from home OR a bag of chips in break room OR cereal bar  12am-4am starving when she gets home from work so will eat anything. 30-30-30: Following  Amount at a time: Can eat a full plate of food but still feels portion is reduced from before surgery. Food Intolerances/issues: none    Client Concerns: Wanting to get back on track    Goals:   Start 1200 kcal post op meal plan (provided and reviewed) and work on consistency with intakes. Regulate sleep pattern to help with sleep wake cycle.   Start MVI/ Ca alternative    Plan: Follow up per MD Beulah Campoverde, RD, LD

## 2022-06-09 ENCOUNTER — PATIENT MESSAGE (OUTPATIENT)
Dept: FAMILY MEDICINE CLINIC | Age: 32
End: 2022-06-09

## 2022-06-09 DIAGNOSIS — M65.4 TENDINITIS, DE QUERVAIN'S: Primary | ICD-10-CM

## 2022-06-09 NOTE — TELEPHONE ENCOUNTER
From: Rebecca Quinteros  To: Marcos Bear  Sent: 6/9/2022 1:26 PM EDT  Subject: Wrist pain     I am still having wrist pain even after taking medication. It's actually worse and I am now having trouble using my hand at all. I can't hold anything for too long. I am looking into taking a leave from work so I can rest it because my job is very physical. Also this being my left wrist (my dominant hand) its making things extremely difficult. In this time I would like to see if maybe we can look further into what might be wrong with it and treatment.

## 2022-06-29 ENCOUNTER — OFFICE VISIT (OUTPATIENT)
Dept: ORTHOPEDIC SURGERY | Age: 32
End: 2022-06-29
Payer: COMMERCIAL

## 2022-06-29 VITALS — RESPIRATION RATE: 16 BRPM | WEIGHT: 243 LBS | HEIGHT: 67 IN | BODY MASS INDEX: 38.14 KG/M2

## 2022-06-29 DIAGNOSIS — M65.4 DE QUERVAIN'S DISEASE (RADIAL STYLOID TENOSYNOVITIS): Primary | ICD-10-CM

## 2022-06-29 PROCEDURE — L3908 WHO COCK-UP NONMOLDE PRE OTS: HCPCS | Performed by: PHYSICIAN ASSISTANT

## 2022-06-29 PROCEDURE — G8417 CALC BMI ABV UP PARAM F/U: HCPCS | Performed by: PHYSICIAN ASSISTANT

## 2022-06-29 PROCEDURE — G8427 DOCREV CUR MEDS BY ELIG CLIN: HCPCS | Performed by: PHYSICIAN ASSISTANT

## 2022-06-29 PROCEDURE — 1036F TOBACCO NON-USER: CPT | Performed by: PHYSICIAN ASSISTANT

## 2022-06-29 PROCEDURE — 99203 OFFICE O/P NEW LOW 30 MIN: CPT | Performed by: PHYSICIAN ASSISTANT

## 2022-06-29 NOTE — PROGRESS NOTES
Ms. Nerissa Vigil is a 28 y.o. left handed woman  who is seen today in Hand Surgical Consultation at the request of Cherri Evans. She presents today regarding left wrist symptoms which have been present for approximately 2 months. A history of antecedent trauma or injury is Absent. She reports symptoms to include moderate pain along the  left  Radial and Dorsal wrist and distal forearm. Wrist symptoms are worse with certain twisting or gripping activities. Symptoms are Daily worse with use. She reports moderate pain with thumb extension or pinch. Symptoms are worsening over time. She states that she has occasional numbness and tingling in her fingers that has been present for the last 2 months. Previous treatment has included prednisone prescribed by her PCP with no symptom relief\. She does claim relation of her symptoms to her required work activities. She has not undergone any form of testing. I have today reviewed with Nerissa Vigil the clinically relevant, past medical history, medications, allergies,  family history, social history, and Review Of Systems & I have documented any details relevant to today's presenting complaints in my history above. Ms. Cam Pineda's self-reported past medical history, medications, allergies,  family history, social history, and Review Of Systems have been scanned into the chart under the \"Media\" tab. Physical Exam:  Ms. Cam Pineda's most recent vitals:  Vitals  Resp: 16  Height: 5' 7\" (170.2 cm)  Weight: 243 lb (110.2 kg)    She is well nourished, oriented to person, place & time. She demonstrates appropriate mood and affect as well as normal gait and station. Skin: Normal in appearance, Normal Color and Free of Lesions Bilaterally   Digital range of motion is without significant limitation bilaterally. Pain accompanies the flexion and extension of the left Thumb.   There is not triggering associated with active thumb motion. There is a palpable mass overlying the site of maximal tenderness left radial wrist.  Wrist range of motion is without significant limitation and is accompanied by mild pain in dorsiflexion greater than palmar flexion. There is no evidence of gross joint instability bilaterally. Sensation is normal in the Median, Ulnar and Radial Sensory distribution bilaterally  Vascular examination reveals normal, good capillary refill and good color bilaterally   Swelling is mild along the radial margin of the wrist on the Left, normal on the Right  Muscular strength is clinically appropriate bilaterally. Examination of the left first dorsal extensor compartment of the wrist demonstrates moderate thickening and fullness. There is moderate tenderness to palpation over the extensor tendons. There is moderate pain with resisted thumb extension, and Finklestein's maneuver is positive left side. Wrist range of motion is accompanied by mild pain in dorsiflexion greater than palmar flexion. Impression:  Ms. Marianna Leos has developed DeQuervain's Tendonitis, which is currently exacerbated, and presents requesting further treatment. Plan:  I have had a thorough discussion with Ms. Marianna Leos regarding the treatment options available for her initially presenting left  DeQuervain's Tenosynovitis, which is causing her significant symptoms and difficulty. I have outlined for Ms. Marianna Leos the risk, benefits and consequences of the various treatment modalities, including a reasonable expectation for the long term success of each. We have discussed the likelihood that further, more aggressive treatment may be required for her current presenting condition. Based upon our current discussion and a reasonable understating of the options available to her, Ms. Marianna Leos has selected to proceed with a conservative plan of treatment consisting of: the use of protective splints, activity modification, and the judicious use of over-the-counter anti-inflammatory medications if allowed by her primary care physician. An appropriately sized Removable forearm based Thumb-Spica orthosis was provided. Instructions were given regarding splint use and wear as well as suggestions for use of the other modalities were discussed. I have clearly explained to her that the above outlined treatment plan should not be expected to 'cure' her  DeQuervain's Tenosynovitis, but we are rather treating the symptoms with which she presents. She has understood that in order to achieve more durable relief of her symptoms and to prevent future worsening or further damage, that definitive surgical treatment would be required. Ms. Nerissa Vigil  voiced an appropriate understanding of our discussion, the options available to her, and of the expectations of her selected  treatment. Procedures    Arlen Mcneil The Vinay-Jose A Orthosis     Patient was prescribed a Arlen Mcneil Titan Wrist and Thumb Brace. The left hand will require stabilization / immobilization from this semi-rigid / rigid orthosis to improve their function. The orthosis will assist in protecting the affected area, provide functional support and facilitate healing. The patient was educated and fit by a healthcare professional with expert knowledge and specialization in brace application while under the direct supervision of the physician. Verbal and written instructions for the use of and application of this item were provided. They were instructed to contact the office immediately should the brace result in increased pain, decreased sensation, increased swelling or worsening of the condition. I have also discussed with Ms. Nerissa Vigil  the other treatment options available to her  for this condition. We have today selected to proceed with conservative management.   She and I have agreed that if our current course of conservative treatment does not prove to be effective over the short term future, that she will schedule a follow-up appointment to discuss and select an alternate course of therapy including possibly injection or surgical treatment. Ms. Arianna Jay has been given a full verbal list of instructions and precautions related to her present condition. I have asked her to followup with me in the office at the prescribed time. She is also specifically requested to call or return to the office sooner if her symptoms change or worsen prior to the next scheduled appointment.

## 2022-06-29 NOTE — LETTER
[address] [place] [address] [address] [facility]  Chelita Atkins [facility] [address]  Phone: [phone]  Fax: ANDREW Escobar        June 29, 2022     Patient: Moreno Atkins   YOB: 1990   Date of Visit: 6/29/2022       To Whom It May Concern: It is my medical opinion that Fab Mao may return to full duty immediately with no restrictionsPlease allow patient to wear brace while at work. If you have any questions or concerns, please don't hesitate to call.     Sincerely,        Alanis Pope PA-C

## 2022-06-29 NOTE — PATIENT INSTRUCTIONS
Thank you for choosing [facility]) Physicians for your Hand and Upper Extremity needs. If we can be of any further assistance to you, please do not hesitate to contact us.     Office Phone Number:  [phone]  or  [phone]

## 2022-06-30 ENCOUNTER — PATIENT MESSAGE (OUTPATIENT)
Dept: FAMILY MEDICINE CLINIC | Age: 32
End: 2022-06-30

## 2022-06-30 NOTE — TELEPHONE ENCOUNTER
From: Melisa Alan  To: Kenisha Palmbecca  Sent: 6/30/2022 5:42 PM EDT  Subject: After seeing specialist     Hello,  I seen the specialist for my wrist yesterday and she gave me a brace to wear pretty much all the time for 6 weeks. She said that I could return back to work right away. I am having a hard time with the brace. Just getting used to it and my wrist and hand are now very sore. I requested from my job to return on the 6th. This will give me enough time to get used to it because I'm returning also with no restrictions. I tried to reach out to the Dr that treated me because the doctors note she gave me has a return date of today. I just wasn't sure if someone could help me with this.

## 2022-06-30 NOTE — LETTER
[address]  [facility] [address]  Phone: [phone]  Fax: [phone]    Cherri Espinoza        July 1, 2022     Patient: Milena Cerda   YOB: 1990           To Whom It May Concern: It is my medical opinion that Ant Barreto should remain out of work until July 6th due to hand injury. If you have any questions or concerns, please don't hesitate to call.     Sincerely,          LAURENCE Espinoza

## 2022-07-08 ENCOUNTER — TELEPHONE (OUTPATIENT)
Dept: ORTHOPEDIC SURGERY | Age: 32
End: 2022-07-08

## 2022-07-08 NOTE — TELEPHONE ENCOUNTER
Spoke with the patient. She works for [employer] and they have paperwork that needs to be filled out because patient cannot work with the brace on. I told her that she could give us the forms and we will look over them and see if they are something that we fill out.

## 2022-07-11 ENCOUNTER — OFFICE VISIT (OUTPATIENT)
Dept: FAMILY MEDICINE CLINIC | Age: 32
End: 2022-07-11
Payer: COMMERCIAL

## 2022-07-11 VITALS
WEIGHT: 248 LBS | OXYGEN SATURATION: 99 % | BODY MASS INDEX: 38.84 KG/M2 | SYSTOLIC BLOOD PRESSURE: 100 MMHG | HEART RATE: 82 BPM | DIASTOLIC BLOOD PRESSURE: 70 MMHG

## 2022-07-11 DIAGNOSIS — M65.4 TENDINITIS, DE QUERVAIN'S: Primary | ICD-10-CM

## 2022-07-11 PROBLEM — F41.1 GAD (GENERALIZED ANXIETY DISORDER): Status: RESOLVED | Noted: 2020-01-15 | Resolved: 2022-07-11

## 2022-07-11 PROCEDURE — G8427 DOCREV CUR MEDS BY ELIG CLIN: HCPCS | Performed by: PHYSICIAN ASSISTANT

## 2022-07-11 PROCEDURE — 99213 OFFICE O/P EST LOW 20 MIN: CPT | Performed by: PHYSICIAN ASSISTANT

## 2022-07-11 PROCEDURE — 1036F TOBACCO NON-USER: CPT | Performed by: PHYSICIAN ASSISTANT

## 2022-07-11 PROCEDURE — G8417 CALC BMI ABV UP PARAM F/U: HCPCS | Performed by: PHYSICIAN ASSISTANT

## 2022-07-11 SDOH — ECONOMIC STABILITY: HOUSING INSECURITY
IN THE LAST 12 MONTHS, WAS THERE A TIME WHEN YOU DID NOT HAVE A STEADY PLACE TO SLEEP OR SLEPT IN A SHELTER (INCLUDING NOW)?: NO

## 2022-07-11 SDOH — ECONOMIC STABILITY: TRANSPORTATION INSECURITY
IN THE PAST 12 MONTHS, HAS LACK OF TRANSPORTATION KEPT YOU FROM MEETINGS, WORK, OR FROM GETTING THINGS NEEDED FOR DAILY LIVING?: NO

## 2022-07-11 SDOH — ECONOMIC STABILITY: INCOME INSECURITY: IN THE LAST 12 MONTHS, WAS THERE A TIME WHEN YOU WERE NOT ABLE TO PAY THE MORTGAGE OR RENT ON TIME?: NO

## 2022-07-11 SDOH — ECONOMIC STABILITY: TRANSPORTATION INSECURITY
IN THE PAST 12 MONTHS, HAS THE LACK OF TRANSPORTATION KEPT YOU FROM MEDICAL APPOINTMENTS OR FROM GETTING MEDICATIONS?: NO

## 2022-07-11 SDOH — ECONOMIC STABILITY: FOOD INSECURITY: WITHIN THE PAST 12 MONTHS, YOU WORRIED THAT YOUR FOOD WOULD RUN OUT BEFORE YOU GOT MONEY TO BUY MORE.: NEVER TRUE

## 2022-07-11 SDOH — ECONOMIC STABILITY: FOOD INSECURITY: WITHIN THE PAST 12 MONTHS, THE FOOD YOU BOUGHT JUST DIDN'T LAST AND YOU DIDN'T HAVE MONEY TO GET MORE.: NEVER TRUE

## 2022-07-11 SDOH — ECONOMIC STABILITY: HOUSING INSECURITY: IN THE LAST 12 MONTHS, HOW MANY PLACES HAVE YOU LIVED?: 0

## 2022-07-11 ASSESSMENT — ENCOUNTER SYMPTOMS: COLOR CHANGE: 0

## 2022-07-11 ASSESSMENT — SOCIAL DETERMINANTS OF HEALTH (SDOH): HOW HARD IS IT FOR YOU TO PAY FOR THE VERY BASICS LIKE FOOD, HOUSING, MEDICAL CARE, AND HEATING?: NOT HARD AT ALL

## 2022-07-11 NOTE — PROGRESS NOTES
Rosibel Giordano (:  1990) is a 28 y.o. female,Established patient, here for evaluation of the following chief complaint(s): Other (paperwork )         ASSESSMENT/PLAN:  1. Tendinitis, de Quervain's  -  Not improved with brace. Evaluation for restrictions while wearing brace was completed in office. Paperwork filled out. Will call to schedule follow up in four weeks with ortho. Return if symptoms worsen or fail to improve. Subjective   SUBJECTIVE/OBJECTIVE:  HPI  The patient is here for left hand pain  She has seen ortho and was diagnosed with Jian Rosenbaum quervain's disease  She was placed in a special brace that has been limiting her range of motion  Has been wearing her brace for the last two weeks, needs to wear it for the next four weeks. 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 Having difficulty with 50 lb and over, unable to close hand to grab. Cannot hold the rope well enough to move the ULD container. Unable to wear safety gloves on the left hand. Work is wanting to switch her position for the next month but needs restrictions. She has actually had more pain since wearing the brace. Not on medication  Will follow up with ortho after 6 weeks    Review of Systems   Musculoskeletal: Positive for arthralgias and joint swelling. Skin: Negative for color change. Neurological: Positive for numbness. Negative for weakness. Hematological: Does not bruise/bleed easily. Objective   Physical Exam  Vitals reviewed. Constitutional:       Appearance: Normal appearance. She is obese. Musculoskeletal:      Comments: Wearing brace on the left hand   Neurological:      Mental Status: She is alert and oriented to person, place, and time. Cranial Nerves: No cranial nerve deficit. An electronic signature was used to authenticate this note.     --LAURENCE Hoffmann

## 2022-08-17 ENCOUNTER — OFFICE VISIT (OUTPATIENT)
Dept: ORTHOPEDIC SURGERY | Age: 32
End: 2022-08-17
Payer: COMMERCIAL

## 2022-08-17 VITALS — RESPIRATION RATE: 16 BRPM | BODY MASS INDEX: 38.92 KG/M2 | WEIGHT: 248 LBS | HEIGHT: 67 IN

## 2022-08-17 DIAGNOSIS — R20.0 NUMBNESS AND TINGLING: Primary | ICD-10-CM

## 2022-08-17 DIAGNOSIS — M65.4 DE QUERVAIN'S DISEASE (RADIAL STYLOID TENOSYNOVITIS): ICD-10-CM

## 2022-08-17 DIAGNOSIS — R20.2 NUMBNESS AND TINGLING: Primary | ICD-10-CM

## 2022-08-17 PROCEDURE — G8427 DOCREV CUR MEDS BY ELIG CLIN: HCPCS | Performed by: PHYSICIAN ASSISTANT

## 2022-08-17 PROCEDURE — G8417 CALC BMI ABV UP PARAM F/U: HCPCS | Performed by: PHYSICIAN ASSISTANT

## 2022-08-17 PROCEDURE — 1036F TOBACCO NON-USER: CPT | Performed by: PHYSICIAN ASSISTANT

## 2022-08-17 PROCEDURE — 99213 OFFICE O/P EST LOW 20 MIN: CPT | Performed by: PHYSICIAN ASSISTANT

## 2022-08-17 NOTE — LETTER
[address] [place] [address] [address] [facility]  [place] [place] [facility] [facility] [address] [address]  Phone: [phone]  Fax: [phone]      August 17, 2022     Patient: Leslie Obregon   YOB: 1990   Date of Visit: 8/17/2022       To Whom it May Concern:    Jesus Yang was seen in my clinic on 8/17/2022. Please allow her to wear a brace on her left hand and wrist while at work. If you have any questions or concerns, please don't hesitate to call.     Sincerely,       Kiersten Arteaga, CNP Left message to return call    RX e-scribed to [employer] as writer does not want Pat to go another day without her Tresiba.

## 2022-08-17 NOTE — PROGRESS NOTES
Ms. Viridiana Weinberg returns today in follow-up of her previously treated  left DeQuarvain's Tendonitis. She was last seen by me in June, 2022 at which time she was treated with activity modification and splinting of the left wrist.  She experienced moderate relief of her initial symptoms. She  has noticed symptom improvement over the last several weeks. She returns today with partially resolved symptoms of left Radial Wrist pain and swelling, requesting further treatment. I have today reviewed with Viridiana Weinberg the clinically relevant, past medical history, medications, allergies,  family history, social history, and Review Of Systems & I have documented any details relevant to today's presenting complaints in my history above. Ms. Kimberly Pineda's self-reported past medical history, medications, allergies,  family history, social history, and Review Of Systems have been scanned into the chart under the \"Media\" tab. Physical Exam:  Vitals  Resp: 16  Height: 5' 7\" (170.2 cm)  Weight: 248 lb (112.5 kg)  Ms. Viridiana Weinberg appears well, she is in no apparent distress, she demonstrates appropriate mood & affect. Skin: Normal in appearance, Normal Color, and Free of Lesions Bilaterally   Digital range of motion is equal bilaterally. Pain does accompany left thumb motion. Wrist range of motion is equal bilaterally and is accompanied by mild pain in dorsiflexion greater than palmar flexion. There is no evidence of gross joint instability bilaterally. Sensation is subjectively present bilaterally  Vascular examination reveals good capillary refill and good color bilaterally  Swelling is minimal along the First Extensor Compartment tendon sheath on the Left, normal on the Right  Muscular strength is clinically appropriate bilaterally. Examination of the left first dorsal extensor compartment of the wrist demonstrates little thickening and fullness.   There is mild tenderness to palpation over the extensor tendons. There is mild pain with resisted thumb extension, and Finklestein's maneuver is positive left side. Examination for Carpal Tunnel Syndrome shows Carpal Tunnel Compression Test to be Mildly Positive on the right & Mildly Positive on the left. The patient displays mild baseline symptoms to potentially confound the exam.  The thenar musculature is not atrophied & weakened. Examination for Cubital Tunnel Syndrome shows no tenderness to palpation at the medial & lateral epicondyles of the Humerus. The Ulnar Nerve rests securely behind the medial epicondyle without subluxation upon elbow flexion. Elbow flexion-compression test is negative, and there is no Tinnel's Sign over the Cubital Tunnel. The Ulnar Nerve innervated intrinsic musculature is without atrophy or weakness. Impression:  Ms. Aftab Valenzuela is showing evidence of persistent DeQuarvain's Tendonitis after previous treatment and carpal tunnel syndrome. She requests additional treatment at this time. Plan:  I have had a thorough discussion with Ms. Aftab Valenzuela regarding the treatment options available for her partially resolved left  DeQuervain's Tenosynovitis, which is causing her significant symptoms and difficulty. I have outlined for Ms. Aftab Valenzuela the risk, benefits and consequences of the various treatment modalities, including a reasonable expectation for the long term success of each. We have discussed the likelihood that further, more aggressive treatment may be required for her current presenting condition. Based upon our current discussion and a reasonable understating of the options available to her, Ms. Aftab Valenzuela has selected to proceed with a conservative plan of treatment consisting of: the use of protective splints, activity modification, and the judicious use of over-the-counter anti-inflammatory medications if allowed by her primary care physician.   An appropriately sized Removable forearm based Thumb-Spica orthosis has been previously provided. Instructions were given regarding splint use and wear as well as suggestions for use of the other modalities were discussed. I have clearly explained to her that the above outlined treatment plan should not be expected to 'cure' her  DeDeja's Tenosynovitis, but we are rather treating the symptoms with which she presents. She has understood that in order to achieve more durable relief of her symptoms and to prevent future worsening or further damage, that definitive surgical treatment would be required. Ms. Connie Collado  voiced an appropriate understanding of our discussion, the options available to her, and of the expectations of her selected  treatment. I have had a thorough discussion with Ms. Connie Collado regarding the treatment options available for her initially presenting bilateral carpal tunnel syndrome, which is causing her significant symptoms and difficulty. I have outlined for Ms. Connie Collado the risk, benefits and consequences of the various treatment modalities, including a reasonable expectation for the long term success of each. We have discussed the likelihood that further, more aggressive treatment may be required for her current presenting condition. Based upon our current discussion and a reasonable understating of the options available to her, Ms. Connie Collado has selected to proceed with a conservative plan of treatment consisting of: the use of protective splints, activity modification, and the judicious use of over-the-counter anti-inflammatory medications if allowed by her primary care physician. An appropriately sized Removable Carpal Tunnel Splint was not indicated. Instructions were given regarding splint use and wear as well as suggestions for use of the other modalities were discussed.   I have clearly explained to her that the above outlined treatment plan should not be expected to 'cure' her carpal tunnel syndrome, but we are rather treating the symptoms with which she presents. She has understood that in order to achieve more durable relief of her symptoms and to prevent future worsening or further damage, that definitive surgical treatment would be required. Ms. Nichole Pinedo  voiced an appropriate understanding of our discussion, the options available to her, and of the expectations of her selected  treatment. We discussed the option of pursuing  Electrodiagnostic Studies  and a prescription  was given. I have also discussed with Ms. Nichole Pinedo  the other treatment options available to her  for this condition. We have today selected to proceed with conservative management. She and I have agreed that if our current course of conservative treatment does not prove to be effective over the short term future, that she will schedule a follow-up appointment to discuss and select an alternate course of therapy including possibly injection or surgical treatment. Ms. Nichole Pinedo has been given a full verbal list of instructions and precautions related to her present condition. I have asked her to followup with me in the office at the prescribed time. She is also specifically requested to call or return to the office sooner if her symptoms change or worsen prior to the next scheduled appointment.

## 2022-08-31 ENCOUNTER — OFFICE VISIT (OUTPATIENT)
Dept: ORTHOPEDIC SURGERY | Age: 32
End: 2022-08-31
Payer: COMMERCIAL

## 2022-08-31 VITALS — HEIGHT: 67 IN | BODY MASS INDEX: 38.92 KG/M2 | RESPIRATION RATE: 16 BRPM | WEIGHT: 248 LBS

## 2022-08-31 DIAGNOSIS — M65.4 DE QUERVAIN'S DISEASE (RADIAL STYLOID TENOSYNOVITIS): Primary | ICD-10-CM

## 2022-08-31 PROCEDURE — 1036F TOBACCO NON-USER: CPT | Performed by: ORTHOPAEDIC SURGERY

## 2022-08-31 PROCEDURE — G8417 CALC BMI ABV UP PARAM F/U: HCPCS | Performed by: ORTHOPAEDIC SURGERY

## 2022-08-31 PROCEDURE — G8428 CUR MEDS NOT DOCUMENT: HCPCS | Performed by: ORTHOPAEDIC SURGERY

## 2022-08-31 PROCEDURE — 99214 OFFICE O/P EST MOD 30 MIN: CPT | Performed by: ORTHOPAEDIC SURGERY

## 2022-08-31 NOTE — PATIENT INSTRUCTIONS
Thank you for choosing [facility]) Physicians for your Hand and Upper Extremity needs. If we can be of any further assistance to you, please do not hesitate to contact us.     Office Phone Number:  [phone]  or  [phone]

## 2022-08-31 NOTE — PROGRESS NOTES
Ms. Sammy Cox returns today in follow-up of her previously treated  left DeQuarvain's Tendonitis. She was last seen by Kristi Parker PA-C in August, 2022 at which time she was treated with conservative measures, activity modification, splinting of the right wrist, and electrodiagnostic studies were ordered to evaluate her distal neurologic symptoms. She experienced minimal relief of her initial symptoms. She  has noticed symptom persistence over the last several weeks. She returns today with continued symptoms of left Radial Wrist pain and burning and tingling in the tips of all five fingers on the left hand , requesting further treatment. I have today reviewed with Sammy Cox the clinically relevant, past medical history, medications, allergies,  family history, social history, and Review Of Systems & I have documented any details relevant to today's presenting complaints in my history above. Ms. Clair Pineda's self-reported past medical history, medications, allergies,  family history, social history, and Review Of Systems have been scanned into the chart under the \"Media\" tab. Physical Exam:  Vitals  Resp: 16  Height: 5' 7\" (170.2 cm)  Weight: 248 lb (112.5 kg)  Ms. Sammy Cox appears well, she is in no apparent distress, she demonstrates appropriate mood & affect. Skin: Normal in appearance, Normal Color, and Free of Lesions Bilaterally   Digital range of motion is without significant limitation on the Left, normal on the Right. Pain does accompany left thumb motion. Wrist range of motion is without significant limitation on the Left, normal on the Right and is accompanied by mild pain in dorsiflexion greater than palmar flexion. There is no evidence of gross joint instability bilaterally.   Sensation is subjectively tingling and burning in all digits on the Left, normal on the Right  Vascular examination reveals normal and good capillary refill bilaterally  Swelling is minimal along the First Extensor Compartment tendon sheath on the Left, normal on the Right  Muscular strength is clinically appropriate bilaterally. Examination of the left first dorsal extensor compartment of the wrist demonstrates little thickening and fullness. There is moderate tenderness to palpation over the extensor tendons. There is moderate pain with resisted thumb extension, and Finklestein's maneuver is positive left side. Examination for Carpal Tunnel Syndrome shows Carpal Tunnel Compression Test to be negative bilaterally. Phalen's Maneuver is negative bilaterally. The thenar musculature shows no evidence of atrophy or weakness. Cervical Spine: Active Range of Motion  is Decreased with pain at extremes. Lateral bending does not reproduce symptoms in the symptomatic extremity, Maximal rotation does not reproduce symptoms in the symptomatic extremity. Review of Electrodiagnostic Testing:  Electrodiagnostic Studies performed by another Physician outside of my practice were Personally Reviewed & Interpreted by myself today. Test performed on: 8/22/22    NERVE CONDUCTION STUDY:  RIGHT   Median Nerve: Sensory Latency: 2.9  Motor Latency: 3.3  Ulnar Nerve:  Conduction Velocity:  62    LEFT  Median Nerve: Sensory Latency: 2.9  Motor Latency: 3.4  Ulnar Nerve:  Conduction Velocity:  66    EMG:  RIGHT  Normal    LEFT  Normal    My Interpretation: This study is consistent with: Nerve conduction studies were normal bilaterally and Electromyography was normal bilaterally      Impression:  Ms. Letha Gregory is showing NO evidence of distal nerve entrapment with some  persistent DeQuarvain's Tendonitis after previous treatment. She requests additional treatment at this time. Plan:    I have had a thorough discussion with Ms. Letha Gregory regarding the treatment options available for her continued left  DeQuervain's Tenosynovitis, which is causing her significant symptoms and difficulty. I have outlined for Ms. Sammy Cox the risk, benefits and consequences of the various treatment modalities, including a reasonable expectation for the long term success of each. We have discussed the likelihood that further, more aggressive treatment may be required for her current presenting condition. Based upon our current discussion and a reasonable understating of the options available to her, Ms. Sammy Cox has selected to proceed with a conservative plan of treatment consisting of: the use of protective splints, activity modification, and the judicious use of over-the-counter anti-inflammatory medications if allowed by her primary care physician. An appropriately sized Removable forearm based Thumb-Spica orthosis has been previously provided. Instructions were given regarding splint use and wear as well as suggestions for use of the other modalities were discussed. I have clearly explained to her that the above outlined treatment plan should not be expected to 'cure' her  DeQuervelinor's Tenosynovitis, but we are rather treating the symptoms with which she presents. She has understood that in order to achieve more durable relief of her symptoms and to prevent future worsening or further damage, that definitive surgical treatment would be required. Ms. Sammy Cox  voiced an appropriate understanding of our discussion, the options available to her, and of the expectations of her selected  treatment. I have also discussed with Ms. Sammy Cox  the other treatment options available to her  for this condition. We have today selected to proceed with conservative management.   She and I have agreed that if our current course of conservative treatment does not prove to be effective over the short term future, that she will schedule a follow-up appointment to discuss and select an alternate course of therapy including possibly injection or

## 2022-08-31 NOTE — Clinical Note
Dear  LAURENCE Valero,  Thank you very much for your referral or Ms. Francisca Burk to me for evaluation and treatment of her Hand & Wrist condition. I appreciate your confidence in me and thank you for allowing me the opportunity to care for your patients. If I can be of any further assistance to you on this or any other patient, please do not hesitate to contact me. Sincerely,  Maris Mae.  Jah Leon MD

## 2022-10-12 ENCOUNTER — HOSPITAL ENCOUNTER (OUTPATIENT)
Age: 32
Discharge: HOME | End: 2022-10-12
Payer: COMMERCIAL

## 2022-10-12 ENCOUNTER — HOSPITAL ENCOUNTER (OUTPATIENT)
Dept: GENERAL RADIOLOGY | Age: 32
Discharge: HOME | End: 2022-10-12
Payer: COMMERCIAL

## 2022-10-12 ENCOUNTER — OFFICE VISIT (OUTPATIENT)
Dept: FAMILY MEDICINE CLINIC | Age: 32
End: 2022-10-12
Payer: COMMERCIAL

## 2022-10-12 VITALS
SYSTOLIC BLOOD PRESSURE: 104 MMHG | OXYGEN SATURATION: 100 % | BODY MASS INDEX: 40.41 KG/M2 | HEART RATE: 87 BPM | DIASTOLIC BLOOD PRESSURE: 78 MMHG | WEIGHT: 258 LBS

## 2022-10-12 DIAGNOSIS — M54.12 CERVICAL RADICULOPATHY: ICD-10-CM

## 2022-10-12 DIAGNOSIS — R53.83 OTHER FATIGUE: ICD-10-CM

## 2022-10-12 DIAGNOSIS — R29.818 SUSPECTED SLEEP APNEA: Primary | ICD-10-CM

## 2022-10-12 LAB
BASOPHILS ABSOLUTE: 0 K/UL (ref 0–0.2)
BASOPHILS RELATIVE PERCENT: 0.6 %
EOSINOPHILS ABSOLUTE: 0.2 K/UL (ref 0–0.6)
EOSINOPHILS RELATIVE PERCENT: 2.2 %
HCT VFR BLD CALC: 39.9 % (ref 36–48)
HEMOGLOBIN: 13.6 G/DL (ref 12–16)
LYMPHOCYTES ABSOLUTE: 2.6 K/UL (ref 1–5.1)
LYMPHOCYTES RELATIVE PERCENT: 34.6 %
MCH RBC QN AUTO: 29.4 PG (ref 26–34)
MCHC RBC AUTO-ENTMCNC: 34.1 G/DL (ref 31–36)
MCV RBC AUTO: 86.3 FL (ref 80–100)
MONOCYTES ABSOLUTE: 0.7 K/UL (ref 0–1.3)
MONOCYTES RELATIVE PERCENT: 9.2 %
NEUTROPHILS ABSOLUTE: 4 K/UL (ref 1.7–7.7)
NEUTROPHILS RELATIVE PERCENT: 53.4 %
PDW BLD-RTO: 13.6 % (ref 12.4–15.4)
PLATELET # BLD: 264 K/UL (ref 135–450)
PMV BLD AUTO: 8.2 FL (ref 5–10.5)
RBC # BLD: 4.63 M/UL (ref 4–5.2)
WBC # BLD: 7.5 K/UL (ref 4–11)

## 2022-10-12 PROCEDURE — G8484 FLU IMMUNIZE NO ADMIN: HCPCS | Performed by: PHYSICIAN ASSISTANT

## 2022-10-12 PROCEDURE — 1036F TOBACCO NON-USER: CPT | Performed by: PHYSICIAN ASSISTANT

## 2022-10-12 PROCEDURE — G8417 CALC BMI ABV UP PARAM F/U: HCPCS | Performed by: PHYSICIAN ASSISTANT

## 2022-10-12 PROCEDURE — 99214 OFFICE O/P EST MOD 30 MIN: CPT | Performed by: PHYSICIAN ASSISTANT

## 2022-10-12 PROCEDURE — G8427 DOCREV CUR MEDS BY ELIG CLIN: HCPCS | Performed by: PHYSICIAN ASSISTANT

## 2022-10-12 PROCEDURE — 72040 X-RAY EXAM NECK SPINE 2-3 VW: CPT

## 2022-10-12 ASSESSMENT — ENCOUNTER SYMPTOMS
ABDOMINAL PAIN: 0
CHEST TIGHTNESS: 0
SHORTNESS OF BREATH: 0
APNEA: 1
COLOR CHANGE: 0
WHEEZING: 0

## 2022-10-12 NOTE — PROGRESS NOTES
Tully Castleman (:  1990) is a 33 y.o. female,Established patient, here for evaluation of the following chief complaint(s):  Neck Pain (Last few months) and Wrist Pain (EMG was normal but still having issues )         ASSESSMENT/PLAN:  1. Suspected sleep apnea  -     [facility] - Poli Patel MD, Sleep Medicine, [facility]  -     Discussed the pathophysiology of DIONNA and how it affects our everyday life. She agrees to a sleep study to see if this is the cause of her feeling unwell  2. Other fatigue  -     TSH with Reflex; Future  -     Vitamin B12; Future  -     Vitamin D 25 Hydroxy; Future  -     Comprehensive Metabolic Panel; Future  -     CBC with Auto Differential; Future  -     Hemoglobin A1C; Future  -     I suspect that this is related to DIONNA but will order testing to rule out diabetes, thyroid problems and vitamin deficiencies that may be contributory  3. Cervical radiculopathy  -     XR CERVICAL SPINE (2-3 VIEWS); Future  -     will start with xray, unclear where the pain is coming from based on a normal EMG however she is clearly still having neurological symptoms. If negative imaging we may trial physical therapy or referral to [facility] per pt's preference. Consider Gabapentin for pain. Return if symptoms worsen or fail to improve.          Subjective   SUBJECTIVE/OBJECTIVE:  HPI  Neck and arm pain:  Location: base of neck, down shoulder and into arm  Recently had an EMG which showed no concerns for radiculopathy or neuropathy but she continues to have these types of symptoms  Started several months ago, Intermittent  Sharp pain that radiates down the left arm  Left arm numbness and tingling radiates down bicep into her hand  Occasionally swollen in the hand only  Denies any weakness or loss of  strength but is finding herself using her nondominant hand more often  Recently diagnosed with Fern Rodriguez which she feels the thumb pain has improved, now off her cast and will be returning to work next week    General feeling of being unwell  Started a few months ago  Reports fatigue with Difficulty staying asleep  Is napping throughout the day, sometimes up to 4 hours at a time  Occasionally goes more than 24 hours without sleep, has happened a few times  +snoring at night  -apneic episodes  Watch telling her that she isn't getting into a deep sleep but for a few hours per night  + family hx of diabetes and thyroid issues, would like these checked as well        Review of Systems   Constitutional:  Positive for fatigue. Negative for activity change, appetite change, diaphoresis and unexpected weight change. Eyes:  Negative for visual disturbance. Respiratory:  Positive for apnea. Negative for chest tightness, shortness of breath and wheezing. Cardiovascular:  Positive for palpitations. Negative for chest pain and leg swelling. Has woken up with tachycardia once   Gastrointestinal:  Negative for abdominal pain. Endocrine: Negative for cold intolerance, heat intolerance, polydipsia, polyphagia and polyuria. Musculoskeletal:  Positive for joint swelling, myalgias and neck pain. Negative for neck stiffness. Skin:  Negative for color change. Neurological:  Positive for numbness. Negative for tremors and weakness. Hematological:  Negative for adenopathy. Does not bruise/bleed easily. Objective   Physical Exam  Vitals reviewed. Constitutional:       Appearance: Normal appearance. She is obese. HENT:      Head: Normocephalic and atraumatic. Neck:      Thyroid: No thyromegaly. Cardiovascular:      Rate and Rhythm: Normal rate and regular rhythm. Heart sounds: Normal heart sounds. Pulmonary:      Effort: Pulmonary effort is normal.      Breath sounds: Normal breath sounds. Abdominal:      General: Bowel sounds are normal.      Palpations: Abdomen is soft. Musculoskeletal:      Cervical back: Spasms, tenderness and bony tenderness present. No crepitus. No pain with movement. Decreased range of motion. Thoracic back: Normal.      Lumbar back: Normal.      Right lower leg: No edema. Left lower leg: No edema. Neurological:      Mental Status: She is alert and oriented to person, place, and time. Cranial Nerves: No cranial nerve deficit. An electronic signature was used to authenticate this note.     --LAURENCE Orozco

## 2022-10-12 NOTE — LETTER
[address]  [address]  [facility] [address]  Phone: [phone]  Fax: [phone]    Cehrri Escalante        October 12, 2022     Patient: Jude Red   YOB: 1990   Date of Visit: 10/12/2022       To Whom It May Concern: It is my medical opinion that Patricia May may return to work on 10/18/22 without restrictions. If you have any questions or concerns, please don't hesitate to call.     Sincerely,          LAURENCE Escalante

## 2022-10-13 LAB
A/G RATIO: 1.9 (ref 1.1–2.2)
ALBUMIN SERPL-MCNC: 4.7 G/DL (ref 3.4–5)
ALP BLD-CCNC: 55 U/L (ref 40–129)
ALT SERPL-CCNC: 17 U/L (ref 10–40)
ANION GAP SERPL CALCULATED.3IONS-SCNC: 13 MMOL/L (ref 3–16)
AST SERPL-CCNC: 15 U/L (ref 15–37)
BILIRUB SERPL-MCNC: 1.9 MG/DL (ref 0–1)
BUN BLDV-MCNC: 11 MG/DL (ref 7–20)
CALCIUM SERPL-MCNC: 9.4 MG/DL (ref 8.3–10.6)
CHLORIDE BLD-SCNC: 104 MMOL/L (ref 99–110)
CO2: 23 MMOL/L (ref 21–32)
CREAT SERPL-MCNC: 0.6 MG/DL (ref 0.6–1.1)
ESTIMATED AVERAGE GLUCOSE: 108.3 MG/DL
GFR AFRICAN AMERICAN: >60
GFR NON-AFRICAN AMERICAN: >60
GLUCOSE BLD-MCNC: 99 MG/DL (ref 70–99)
HBA1C MFR BLD: 5.4 %
POTASSIUM SERPL-SCNC: 4.1 MMOL/L (ref 3.5–5.1)
SODIUM BLD-SCNC: 140 MMOL/L (ref 136–145)
TOTAL PROTEIN: 7.2 G/DL (ref 6.4–8.2)
TSH REFLEX: 2.25 UIU/ML (ref 0.27–4.2)
VITAMIN B-12: 444 PG/ML (ref 211–911)
VITAMIN D 25-HYDROXY: 41.3 NG/ML

## 2022-10-17 DIAGNOSIS — R20.0 NUMBNESS AND TINGLING IN LEFT ARM: Primary | ICD-10-CM

## 2022-10-17 DIAGNOSIS — R20.2 NUMBNESS AND TINGLING IN LEFT ARM: Primary | ICD-10-CM

## 2022-11-09 NOTE — TELEPHONE ENCOUNTER
Patient seen myra moody, do I need to send to her? O-T Plasty Text: The defect edges were debeveled with a #15 scalpel blade.  Given the location of the defect, shape of the defect and the proximity to free margins an O-T plasty was deemed most appropriate.  Using a sterile surgical marker, an appropriate O-T plasty was drawn incorporating the defect and placing the expected incisions within the relaxed skin tension lines where possible.    The area thus outlined was incised deep to adipose tissue with a #15 scalpel blade.  The skin margins were undermined to an appropriate distance in all directions utilizing iris scissors.

## 2023-02-21 ENCOUNTER — OFFICE VISIT (OUTPATIENT)
Dept: FAMILY MEDICINE CLINIC | Age: 33
End: 2023-02-21
Payer: COMMERCIAL

## 2023-02-21 VITALS
HEIGHT: 67 IN | TEMPERATURE: 97.6 F | DIASTOLIC BLOOD PRESSURE: 76 MMHG | HEART RATE: 70 BPM | BODY MASS INDEX: 40.49 KG/M2 | OXYGEN SATURATION: 98 % | WEIGHT: 258 LBS | SYSTOLIC BLOOD PRESSURE: 128 MMHG

## 2023-02-21 DIAGNOSIS — U07.1 COVID-19: Primary | ICD-10-CM

## 2023-02-21 DIAGNOSIS — Z20.822 CONTACT WITH AND (SUSPECTED) EXPOSURE TO COVID-19: ICD-10-CM

## 2023-02-21 PROCEDURE — G8427 DOCREV CUR MEDS BY ELIG CLIN: HCPCS | Performed by: NURSE PRACTITIONER

## 2023-02-21 PROCEDURE — 99213 OFFICE O/P EST LOW 20 MIN: CPT | Performed by: NURSE PRACTITIONER

## 2023-02-21 PROCEDURE — G8484 FLU IMMUNIZE NO ADMIN: HCPCS | Performed by: NURSE PRACTITIONER

## 2023-02-21 PROCEDURE — 1036F TOBACCO NON-USER: CPT | Performed by: NURSE PRACTITIONER

## 2023-02-21 PROCEDURE — G8417 CALC BMI ABV UP PARAM F/U: HCPCS | Performed by: NURSE PRACTITIONER

## 2023-02-21 RX ORDER — IBUPROFEN 800 MG/1
800 TABLET ORAL EVERY 8 HOURS PRN
Qty: 15 TABLET | Refills: 0 | Status: SHIPPED | OUTPATIENT
Start: 2023-02-21 | End: 2023-02-26

## 2023-02-21 ASSESSMENT — ENCOUNTER SYMPTOMS
NAUSEA: 0
DIARRHEA: 1
SINUS PRESSURE: 1
SORE THROAT: 0
SHORTNESS OF BREATH: 0
COUGH: 1
RHINORRHEA: 1
SINUS PAIN: 1
VOMITING: 0

## 2023-02-21 NOTE — PATIENT INSTRUCTIONS
Drink plenty of fluids   Get plenty of rest  Go to nearest ER if develop shortness of breath, chest pain or significant worsening of symptoms   Notify office if symptoms worsen or do not improve   Warm air humidifier or steamy shower   Flonase daily   Take Ibuprofen with food, do not take any extra Ibuprofen or Ibuprofen containing products   Normal saline nasal spray

## 2023-02-21 NOTE — PROGRESS NOTES
Chaim Merlos (:  1990) is a 33 y.o. female,Established patient, here for evaluation of the following chief complaint(s): Other and Covid Testing (Symptoms start on 2023, patient states that she has the fallowing sxs of headache, lost of taste and smell. Diarrhea, body aches, slight cough. Patient states she has not had a fever. Patient ha been taking Tylenol for her headache and congestion rx which has not helped. )         ASSESSMENT/PLAN:  1. COVID-19  -     ibuprofen (ADVIL;MOTRIN) 800 MG tablet; Take 1 tablet by mouth every 8 hours as needed for Pain, Disp-15 tablet, R-0Normal  2. Contact with and (suspected) exposure to covid-19  -     COVID-19    -Discussed Viral illness, no antibiotics recommended at this time. Patient verbalized understanding.   -Discussed symptom management   -Discussed alarm symptoms and when to go to ER   -If symptoms worsen or do not improve, notify office    Drink plenty of fluids   Get plenty of rest  Go to nearest ER if develop shortness of breath, chest pain or significant worsening of symptoms   Notify office if symptoms worsen or do not improve   Warm air humidifier or steamy shower   Flonase daily   Take Ibuprofen with food, do not take any extra Ibuprofen or Ibuprofen containing products   Normal saline nasal spray     Return if symptoms worsen or fail to improve. Subjective   SUBJECTIVE/OBJECTIVE:  Reports symptoms started yesterday. Loss of taste and smell, headache, congestion   Home test positive yesterday, has had Covid twice before    positive as well   Has been taking Tylenol and Advil decongestant           Other  Associated symptoms include congestion, coughing, fatigue, headaches and myalgias. Pertinent negatives include no chills, fever, nausea, sore throat or vomiting. Review of Systems   Constitutional:  Positive for fatigue. Negative for chills and fever.    HENT:  Positive for congestion, ear pain, postnasal drip, rhinorrhea, sinus pressure and sinus pain. Negative for sore throat. Respiratory:  Positive for cough. Negative for shortness of breath. Dry    Gastrointestinal:  Positive for diarrhea. Negative for nausea and vomiting. Musculoskeletal:  Positive for myalgias. Neurological:  Positive for headaches. Objective   Physical Exam  Constitutional:       General: She is not in acute distress. HENT:      Head: Normocephalic. Right Ear: Ear canal and external ear normal. A middle ear effusion is present. Left Ear: Ear canal and external ear normal. A middle ear effusion is present. Nose: Rhinorrhea present. Rhinorrhea is clear. Right Sinus: Frontal sinus tenderness present. No maxillary sinus tenderness. Left Sinus: Frontal sinus tenderness present. No maxillary sinus tenderness. Mouth/Throat:      Lips: Pink. Mouth: Mucous membranes are moist.      Pharynx: Oropharynx is clear. No pharyngeal swelling, oropharyngeal exudate or posterior oropharyngeal erythema. Eyes:      Pupils: Pupils are equal, round, and reactive to light. Cardiovascular:      Rate and Rhythm: Normal rate and regular rhythm. Pulmonary:      Effort: Pulmonary effort is normal.      Breath sounds: Normal breath sounds. No wheezing, rhonchi or rales. Musculoskeletal:      Cervical back: Normal range of motion. Lymphadenopathy:      Cervical: No cervical adenopathy. Skin:     General: Skin is warm and dry. Capillary Refill: Capillary refill takes less than 2 seconds. Neurological:      General: No focal deficit present. Mental Status: She is alert and oriented to person, place, and time. This note was generated completely or in part utilizing Dragon dictation speech recognition software. Occasionally, words are mistranscribed and despite editing, the text may contain inaccuracies due to incorrect word recognition.   If further clarification is needed please contact the office at [phone]. An electronic signature was used to authenticate this note.     --PRISCILA Cross - CNP

## 2023-02-21 NOTE — LETTER
[address]  [address]  [facility] [address]  Phone: [phone]  Fax: [phone]    PRISCILA Pham CNP        February 21, 2023     Patient: Frankey Daring   YOB: 1990   Date of Visit: 2/21/2023       To Whom It May Concern: It is my medical opinion that Shagufta Standing may return to work on 2/28/23    If you have any questions or concerns, please don't hesitate to call.     Sincerely,        PRISCILA Pham CNP

## 2023-02-22 LAB — SARS-COV-2: DETECTED

## 2023-02-23 DIAGNOSIS — R05.1 ACUTE COUGH: ICD-10-CM

## 2023-02-23 DIAGNOSIS — U07.1 COVID-19: Primary | ICD-10-CM

## 2023-02-23 RX ORDER — BENZONATATE 200 MG/1
200 CAPSULE ORAL 3 TIMES DAILY PRN
Qty: 21 CAPSULE | Refills: 0 | Status: SHIPPED | OUTPATIENT
Start: 2023-02-23 | End: 2023-03-02

## 2023-02-27 ENCOUNTER — OFFICE VISIT (OUTPATIENT)
Dept: FAMILY MEDICINE CLINIC | Age: 33
End: 2023-02-27

## 2023-02-27 VITALS — DIASTOLIC BLOOD PRESSURE: 78 MMHG | SYSTOLIC BLOOD PRESSURE: 120 MMHG | TEMPERATURE: 97.5 F

## 2023-02-27 DIAGNOSIS — J01.90 ACUTE BACTERIAL SINUSITIS: Primary | ICD-10-CM

## 2023-02-27 DIAGNOSIS — B96.89 ACUTE BACTERIAL SINUSITIS: Primary | ICD-10-CM

## 2023-02-27 DIAGNOSIS — J02.9 ACUTE PHARYNGITIS, UNSPECIFIED ETIOLOGY: ICD-10-CM

## 2023-02-27 LAB — S PYO AG THROAT QL: NORMAL

## 2023-02-27 RX ORDER — AMOXICILLIN AND CLAVULANATE POTASSIUM 875; 125 MG/1; MG/1
1 TABLET, FILM COATED ORAL 2 TIMES DAILY
Qty: 20 TABLET | Refills: 0 | Status: SHIPPED | OUTPATIENT
Start: 2023-02-27 | End: 2023-03-09

## 2023-02-27 RX ORDER — FLUCONAZOLE 150 MG/1
150 TABLET ORAL ONCE
Qty: 1 TABLET | Refills: 0 | Status: SHIPPED | OUTPATIENT
Start: 2023-02-27 | End: 2023-02-27

## 2023-02-27 ASSESSMENT — ENCOUNTER SYMPTOMS
ABDOMINAL PAIN: 1
SORE THROAT: 1
COUGH: 1

## 2023-02-27 NOTE — LETTER
[address]  [address]  [facility] [address]  Phone: [phone]  Fax: [phone]    PRISCILA Mercado CNP        February 27, 2023     Patient: Andrei Parrish   YOB: 1990   Date of Visit: 2/27/2023       To Whom It May Concern: It is my medical opinion that Arlette Bartlett may return to work on 3/2/23. If you have any questions or concerns, please don't hesitate to call.     Sincerely,        PRISCILA Mercado CNP

## 2023-02-27 NOTE — PROGRESS NOTES
Wade Moore (:  1990) is a 33 y.o. female,Established patient, here for evaluation of the following chief complaint(s):  Headache (Sxs, started on 2023)         ASSESSMENT/PLAN:  1. Acute bacterial sinusitis  -     amoxicillin-clavulanate (AUGMENTIN) 875-125 MG per tablet; Take 1 tablet by mouth 2 times daily for 10 days, Disp-20 tablet, R-0Normal  -     diclofenac (VOLTAREN) 50 MG EC tablet; Take 1 tablet by mouth 2 times daily as needed for Pain (headache), Disp-14 tablet, R-0Normal  -     fluconazole (DIFLUCAN) 150 MG tablet; Take 1 tablet by mouth once for 1 dose, Disp-1 tablet, R-0Normal  2. Acute pharyngitis, unspecified etiology  -     POCT rapid strep A    -Rapid strep negative   -Take antibiotic with food, encouraged extra yogurt or probiotic.   -Finish full course of antibiotic   -Notify office if symptoms worsen or improvement   -Stay hydrated  -Continue symptom management   -Diclofenac for headache prn   -Diflucan per patient's request, states she gets yeast infections with antibiotics    Return if symptoms worsen or fail to improve. Subjective   SUBJECTIVE/OBJECTIVE:  Tested positive for Covid one week ago. Since then symptoms have not improved and some have worsened (sore throat, cough, generally not feeling well)  Has been treating with OTC medications. Review of Systems   Constitutional:  Negative for chills and fever. HENT:  Positive for congestion, ear pain and sore throat. Respiratory:  Positive for cough. Occasionally productive for yellowish sputum    Gastrointestinal:  Positive for abdominal pain. Musculoskeletal:  Positive for myalgias. Neurological:  Positive for headaches. Objective   Physical Exam  Vitals reviewed. Constitutional:       Appearance: She is ill-appearing. HENT:      Head: Normocephalic. Right Ear: Ear canal and external ear normal. A middle ear effusion is present.  Tympanic membrane is not perforated, erythematous or bulging. Left Ear: Ear canal and external ear normal. A middle ear effusion is present. Tympanic membrane is not perforated, erythematous or bulging. Nose: Rhinorrhea present. Rhinorrhea is purulent. Right Turbinates: Swollen. Left Turbinates: Swollen. Right Sinus: Frontal sinus tenderness present. No maxillary sinus tenderness. Left Sinus: Frontal sinus tenderness present. No maxillary sinus tenderness. Mouth/Throat:      Lips: Pink. Mouth: Mucous membranes are dry. Pharynx: Oropharynx is clear. Posterior oropharyngeal erythema present. No pharyngeal swelling or oropharyngeal exudate. Eyes:      Pupils: Pupils are equal, round, and reactive to light. Neck:      Comments: Left occipital lymph node tender to touch, slightly enlarged     Cardiovascular:      Rate and Rhythm: Normal rate and regular rhythm. Pulmonary:      Effort: Pulmonary effort is normal.      Breath sounds: Normal breath sounds. No wheezing, rhonchi or rales. Musculoskeletal:      Cervical back: Normal range of motion. No rigidity. Skin:     General: Skin is warm and dry. Capillary Refill: Capillary refill takes less than 2 seconds. Neurological:      General: No focal deficit present. Mental Status: She is alert and oriented to person, place, and time. This note was generated completely or in part utilizing Dragon dictation speech recognition software. Occasionally, words are mistranscribed and despite editing, the text may contain inaccuracies due to incorrect word recognition. If further clarification is needed please contact the office at [phone]. An electronic signature was used to authenticate this note.     --Melia Joe, PRISCILA - CNP

## 2023-03-10 ENCOUNTER — PATIENT MESSAGE (OUTPATIENT)
Dept: FAMILY MEDICINE CLINIC | Age: 33
End: 2023-03-10

## 2023-03-10 NOTE — TELEPHONE ENCOUNTER
From: Julianna Cancino  To: Radhaneil Walkerariel  Sent: 3/10/2023 1:08 PM EST  Subject: Follow up about work notes     I returned back to work and talked to my HR department and they are requesting 2 letters. One stating my return to work date of 3/8/2023. The second letter they are requesting needs to have the information that I have attached to this. My leave date is from 2/27/2023- 3/7/2023.

## 2023-07-10 ENCOUNTER — PATIENT MESSAGE (OUTPATIENT)
Dept: FAMILY MEDICINE CLINIC | Age: 33
End: 2023-07-10

## 2023-07-11 ENCOUNTER — OFFICE VISIT (OUTPATIENT)
Dept: FAMILY MEDICINE CLINIC | Age: 33
End: 2023-07-11
Payer: COMMERCIAL

## 2023-07-11 ENCOUNTER — HOSPITAL ENCOUNTER (OUTPATIENT)
Dept: GENERAL RADIOLOGY | Age: 33
Discharge: HOME | End: 2023-07-11
Payer: COMMERCIAL

## 2023-07-11 VITALS
SYSTOLIC BLOOD PRESSURE: 112 MMHG | HEART RATE: 72 BPM | TEMPERATURE: 98.7 F | OXYGEN SATURATION: 99 % | WEIGHT: 255 LBS | BODY MASS INDEX: 39.94 KG/M2 | DIASTOLIC BLOOD PRESSURE: 76 MMHG

## 2023-07-11 DIAGNOSIS — S91.331A PUNCTURE WOUND OF RIGHT FOOT, INITIAL ENCOUNTER: Primary | ICD-10-CM

## 2023-07-11 DIAGNOSIS — S91.331A PUNCTURE WOUND OF RIGHT FOOT, INITIAL ENCOUNTER: ICD-10-CM

## 2023-07-11 PROCEDURE — 99214 OFFICE O/P EST MOD 30 MIN: CPT | Performed by: PHYSICIAN ASSISTANT

## 2023-07-11 PROCEDURE — 73630 X-RAY EXAM OF FOOT: CPT

## 2023-07-11 PROCEDURE — G8417 CALC BMI ABV UP PARAM F/U: HCPCS | Performed by: PHYSICIAN ASSISTANT

## 2023-07-11 PROCEDURE — G8427 DOCREV CUR MEDS BY ELIG CLIN: HCPCS | Performed by: PHYSICIAN ASSISTANT

## 2023-07-11 PROCEDURE — 1036F TOBACCO NON-USER: CPT | Performed by: PHYSICIAN ASSISTANT

## 2023-07-11 ASSESSMENT — PATIENT HEALTH QUESTIONNAIRE - PHQ9
SUM OF ALL RESPONSES TO PHQ QUESTIONS 1-9: 13
3. TROUBLE FALLING OR STAYING ASLEEP: SEVERAL DAYS
6. FEELING BAD ABOUT YOURSELF - OR THAT YOU ARE A FAILURE OR HAVE LET YOURSELF OR YOUR FAMILY DOWN: SEVERAL DAYS
2. FEELING DOWN, DEPRESSED OR HOPELESS: 1
4. FEELING TIRED OR HAVING LITTLE ENERGY: 3
SUM OF ALL RESPONSES TO PHQ QUESTIONS 1-9: 13
5. POOR APPETITE OR OVEREATING: MORE THAN HALF THE DAYS
4. FEELING TIRED OR HAVING LITTLE ENERGY: NEARLY EVERY DAY
9. THOUGHTS THAT YOU WOULD BE BETTER OFF DEAD, OR OF HURTING YOURSELF: NOT AT ALL
SUM OF ALL RESPONSES TO PHQ QUESTIONS 1-9: 13
10. IF YOU CHECKED OFF ANY PROBLEMS, HOW DIFFICULT HAVE THESE PROBLEMS MADE IT FOR YOU TO DO YOUR WORK, TAKE CARE OF THINGS AT HOME, OR GET ALONG WITH OTHER PEOPLE: VERY DIFFICULT
3. TROUBLE FALLING OR STAYING ASLEEP: 1
SUM OF ALL RESPONSES TO PHQ QUESTIONS 1-9: 13
10. IF YOU CHECKED OFF ANY PROBLEMS, HOW DIFFICULT HAVE THESE PROBLEMS MADE IT FOR YOU TO DO YOUR WORK, TAKE CARE OF THINGS AT HOME, OR GET ALONG WITH OTHER PEOPLE: 2
8. MOVING OR SPEAKING SO SLOWLY THAT OTHER PEOPLE COULD HAVE NOTICED. OR THE OPPOSITE, BEING SO FIGETY OR RESTLESS THAT YOU HAVE BEEN MOVING AROUND A LOT MORE THAN USUAL: 1
7. TROUBLE CONCENTRATING ON THINGS, SUCH AS READING THE NEWSPAPER OR WATCHING TELEVISION: 3
5. POOR APPETITE OR OVEREATING: 2
6. FEELING BAD ABOUT YOURSELF - OR THAT YOU ARE A FAILURE OR HAVE LET YOURSELF OR YOUR FAMILY DOWN: 1
9. THOUGHTS THAT YOU WOULD BE BETTER OFF DEAD, OR OF HURTING YOURSELF: 0
7. TROUBLE CONCENTRATING ON THINGS, SUCH AS READING THE NEWSPAPER OR WATCHING TELEVISION: NEARLY EVERY DAY
2. FEELING DOWN, DEPRESSED OR HOPELESS: SEVERAL DAYS
1. LITTLE INTEREST OR PLEASURE IN DOING THINGS: SEVERAL DAYS
SUM OF ALL RESPONSES TO PHQ9 QUESTIONS 1 & 2: 2
8. MOVING OR SPEAKING SO SLOWLY THAT OTHER PEOPLE COULD HAVE NOTICED. OR THE OPPOSITE - BEING SO FIDGETY OR RESTLESS THAT YOU HAVE BEEN MOVING AROUND A LOT MORE THAN USUAL: SEVERAL DAYS
1. LITTLE INTEREST OR PLEASURE IN DOING THINGS: 1
SUM OF ALL RESPONSES TO PHQ QUESTIONS 1-9: 13

## 2023-07-11 ASSESSMENT — ENCOUNTER SYMPTOMS: COLOR CHANGE: 1

## 2023-07-11 NOTE — PROGRESS NOTES
Bernie Johnson (:  1990) is a 33 y.o. female,Established patient, here for evaluation of the following chief complaint(s): Foot Pain (Right foot/)         ASSESSMENT/PLAN:  1. Puncture wound of right foot, initial encounter  -     XR FOOT RIGHT (MIN 3 VIEWS); Future  -    ace bandage given today for compression. Continue with ice and elevation. Will write her off work today. Concern for beginning of infection but will review imaging before medication is sent      Return if symptoms worsen or fail to improve. Subjective   SUBJECTIVE/OBJECTIVE:  HPI  Foot injury  Timing: occurred on Saturday when the metal end of a comb pierced the dorsum of the right foot  Current symptoms: swelling, pulling sensation and goes up her calf, slight surrounding pink color, weakness of toes, possibly due to pain but unsure  Denies: numbness  Tx: tylenol, ice  The pt is unsure if the comb was clean but likely not    Review of Systems   Musculoskeletal:  Positive for myalgias. Swelling around tissue of injury   Skin:  Positive for color change and wound. Objective   Physical Exam  Vitals reviewed. Constitutional:       Appearance: Normal appearance. Skin:     Findings: Wound present. Comments: Mild surrounding pink coloration, no streaking erythema   Neurological:      Mental Status: She is alert. Comments: Weakness in 3rd, 4th and 5th toe of the right foot                An electronic signature was used to authenticate this note.     --LAURENCE Nolasco

## 2023-07-12 ENCOUNTER — PATIENT MESSAGE (OUTPATIENT)
Dept: FAMILY MEDICINE CLINIC | Age: 33
End: 2023-07-12

## 2023-08-30 ENCOUNTER — OFFICE VISIT (OUTPATIENT)
Dept: FAMILY MEDICINE CLINIC | Age: 33
End: 2023-08-30
Payer: COMMERCIAL

## 2023-08-30 VITALS
OXYGEN SATURATION: 94 % | DIASTOLIC BLOOD PRESSURE: 60 MMHG | SYSTOLIC BLOOD PRESSURE: 100 MMHG | BODY MASS INDEX: 40.81 KG/M2 | HEART RATE: 86 BPM | HEIGHT: 67 IN | WEIGHT: 260 LBS

## 2023-08-30 DIAGNOSIS — F51.05 INSOMNIA DUE TO OTHER MENTAL DISORDER: ICD-10-CM

## 2023-08-30 DIAGNOSIS — F99 INSOMNIA DUE TO OTHER MENTAL DISORDER: ICD-10-CM

## 2023-08-30 DIAGNOSIS — F32.A ANXIETY AND DEPRESSION: ICD-10-CM

## 2023-08-30 DIAGNOSIS — R19.7 DIARRHEA, UNSPECIFIED TYPE: ICD-10-CM

## 2023-08-30 DIAGNOSIS — Z00.00 PHYSICAL EXAM, ANNUAL: Primary | ICD-10-CM

## 2023-08-30 DIAGNOSIS — F41.9 ANXIETY AND DEPRESSION: ICD-10-CM

## 2023-08-30 DIAGNOSIS — Z00.00 PHYSICAL EXAM, ANNUAL: ICD-10-CM

## 2023-08-30 LAB
DEPRECATED RDW RBC AUTO: 13.7 % (ref 12.4–15.4)
HCT VFR BLD AUTO: 39.4 % (ref 36–48)
HGB BLD-MCNC: 13.2 G/DL (ref 12–16)
IGA SERPL-MCNC: 55 MG/DL (ref 70–400)
MCH RBC QN AUTO: 29.4 PG (ref 26–34)
MCHC RBC AUTO-ENTMCNC: 33.6 G/DL (ref 31–36)
MCV RBC AUTO: 87.5 FL (ref 80–100)
PLATELET # BLD AUTO: 283 K/UL (ref 135–450)
PMV BLD AUTO: 8.5 FL (ref 5–10.5)
RBC # BLD AUTO: 4.5 M/UL (ref 4–5.2)
WBC # BLD AUTO: 8.3 K/UL (ref 4–11)

## 2023-08-30 PROCEDURE — 99395 PREV VISIT EST AGE 18-39: CPT | Performed by: PHYSICIAN ASSISTANT

## 2023-08-30 PROCEDURE — 99214 OFFICE O/P EST MOD 30 MIN: CPT | Performed by: PHYSICIAN ASSISTANT

## 2023-08-30 PROCEDURE — G8417 CALC BMI ABV UP PARAM F/U: HCPCS | Performed by: PHYSICIAN ASSISTANT

## 2023-08-30 PROCEDURE — 1036F TOBACCO NON-USER: CPT | Performed by: PHYSICIAN ASSISTANT

## 2023-08-30 PROCEDURE — G8427 DOCREV CUR MEDS BY ELIG CLIN: HCPCS | Performed by: PHYSICIAN ASSISTANT

## 2023-08-30 RX ORDER — CITALOPRAM 20 MG/1
TABLET ORAL
Qty: 25 TABLET | Refills: 0 | Status: SHIPPED | OUTPATIENT
Start: 2023-08-30 | End: 2023-09-27

## 2023-08-30 RX ORDER — HYDROXYZINE HYDROCHLORIDE 25 MG/1
25 TABLET, FILM COATED ORAL EVERY 8 HOURS PRN
Qty: 30 TABLET | Refills: 0 | Status: SHIPPED | OUTPATIENT
Start: 2023-08-30 | End: 2023-09-29

## 2023-08-30 SDOH — ECONOMIC STABILITY: FOOD INSECURITY: WITHIN THE PAST 12 MONTHS, YOU WORRIED THAT YOUR FOOD WOULD RUN OUT BEFORE YOU GOT MONEY TO BUY MORE.: NEVER TRUE

## 2023-08-30 SDOH — ECONOMIC STABILITY: INCOME INSECURITY: HOW HARD IS IT FOR YOU TO PAY FOR THE VERY BASICS LIKE FOOD, HOUSING, MEDICAL CARE, AND HEATING?: NOT HARD AT ALL

## 2023-08-30 SDOH — ECONOMIC STABILITY: FOOD INSECURITY: WITHIN THE PAST 12 MONTHS, THE FOOD YOU BOUGHT JUST DIDN'T LAST AND YOU DIDN'T HAVE MONEY TO GET MORE.: NEVER TRUE

## 2023-08-30 ASSESSMENT — PATIENT HEALTH QUESTIONNAIRE - PHQ9
1. LITTLE INTEREST OR PLEASURE IN DOING THINGS: 2
SUM OF ALL RESPONSES TO PHQ QUESTIONS 1-9: 18
7. TROUBLE CONCENTRATING ON THINGS, SUCH AS READING THE NEWSPAPER OR WATCHING TELEVISION: 3
SUM OF ALL RESPONSES TO PHQ QUESTIONS 1-9: 18
9. THOUGHTS THAT YOU WOULD BE BETTER OFF DEAD, OR OF HURTING YOURSELF: 0
DEPRESSION UNABLE TO ASSESS: FUNCTIONAL CAPACITY MOTIVATION LIMITS ACCURACY
3. TROUBLE FALLING OR STAYING ASLEEP: 3
6. FEELING BAD ABOUT YOURSELF - OR THAT YOU ARE A FAILURE OR HAVE LET YOURSELF OR YOUR FAMILY DOWN: 2
SUM OF ALL RESPONSES TO PHQ9 QUESTIONS 1 & 2: 3
8. MOVING OR SPEAKING SO SLOWLY THAT OTHER PEOPLE COULD HAVE NOTICED. OR THE OPPOSITE, BEING SO FIGETY OR RESTLESS THAT YOU HAVE BEEN MOVING AROUND A LOT MORE THAN USUAL: 2
4. FEELING TIRED OR HAVING LITTLE ENERGY: 3
2. FEELING DOWN, DEPRESSED OR HOPELESS: 1
10. IF YOU CHECKED OFF ANY PROBLEMS, HOW DIFFICULT HAVE THESE PROBLEMS MADE IT FOR YOU TO DO YOUR WORK, TAKE CARE OF THINGS AT HOME, OR GET ALONG WITH OTHER PEOPLE: 3
SUM OF ALL RESPONSES TO PHQ QUESTIONS 1-9: 18
5. POOR APPETITE OR OVEREATING: 2
SUM OF ALL RESPONSES TO PHQ QUESTIONS 1-9: 18

## 2023-08-30 ASSESSMENT — ENCOUNTER SYMPTOMS
ABDOMINAL PAIN: 1
SHORTNESS OF BREATH: 0
WHEEZING: 0
NAUSEA: 0
DIARRHEA: 1
BLOOD IN STOOL: 0
VOMITING: 0
ABDOMINAL DISTENTION: 0

## 2023-08-30 NOTE — PROGRESS NOTES
Luz Marina Castorena (:  1990) is a 29 y.o. female,Established patient, here for evaluation of the following chief complaint(s): Annual Exam and Depression (Depression and possible ADHD symptoms )         ASSESSMENT/PLAN:  1. Diarrhea, unspecified type  -     C-Reactive Protein; Future  -     CALPROTECTIN STOOL; Future  -     TSH; Future  -     T4, Free; Future  -     Celiac Screen with Reflex; Future  -      suspect IBS, will do initial lab work to rule out thyroid disorder, ibd and celiac  2. Anxiety and depression  -     citalopram (CELEXA) 20 MG tablet; Take 0.5 tablets by mouth daily for 7 days, THEN 1 tablet daily for 21 days. , Disp-25 tablet, R-0Normal  -  uncontrolled, suspect this is the cause of her inattention. Will start her on a ramp of citalopram. Close follow up in 5 weeks  3. Insomnia due to other mental disorder  -     hydrOXYzine HCl (ATARAX) 25 MG tablet; Take 1 tablet by mouth every 8 hours as needed for Anxiety, Disp-30 tablet, R-0Normal  -     will trial atarax. Did not do well with trazodone. Medication risks, benefits and side effects were discussed      Return in about 5 weeks (around 10/4/2023) for anxiety and depresion.          Subjective   SUBJECTIVE/OBJECTIVE:  HPI  Depression/ inattentive:  Current symptoms: anxiety due to not completing things, difficulty falling asleep, occasionally sleeping 10-12 hours, decreased energy, some racing thoughts at night, procrastination, negative self talk because she is frustrated with not getting everything done for her children, states that she is fidgety and restless, trouble focusing at work, impulsive with her money, gambling and drinking more alcohol  Denies: SI/HI, self harming  Not doing any self care at this time  Past medications: celexa, melatonin, trazodone,     Diarrhea:  Timing: several months, occurring intermittently up to several times per day  Stool is very loose to watery- no blood or mucous  There is abdominal cramping
by mouth Yes Historical Provider, MD   levonorgestrel [facility]) IUD 52 mg 1 each by IntraUTERine route once Yes Historical Provider, MD   diclofenac (VOLTAREN) 50 MG EC tablet Take 1 tablet by mouth 2 times daily as needed for Pain (headache)  Shea Lopez APRN - CNP        No Known Allergies    Past Medical History:   Diagnosis Date    Acid reflux     Broken tooth     COVID-19 12/2020       Past Surgical History:   Procedure Laterality Date    CHOLECYSTECTOMY      SLEEVE GASTRECTOMY N/A 9/15/2020    ROBOTIC ASSISTED LAPAROSCOPIC VERTICAL SLEEVE GASTRECTOMY, HIATAL HERNIA REPAIR performed by Tillman Paget, DO at [address] [place] [address] N/A 6/8/2020    EGD BIOPSY performed by Tillman Paget, DO at [address] [facility] History     Socioeconomic History    Marital status: Single     Spouse name: Not on file    Number of children: Not on file    Years of education: Not on file    Highest education level: Not on file   Occupational History    Not on file   Tobacco Use    Smoking status: Never    Smokeless tobacco: Never   Vaping Use    Vaping Use: Not on file   Substance and Sexual Activity    Alcohol use: Not Currently     Comment: 2 x/ yr    Drug use: No    Sexual activity: Yes     Partners: Male   Other Topics Concern    Not on file   Social History Narrative    Not on file     Social Determinants of Health     Financial Resource Strain: Low Risk     Difficulty of Paying Living Expenses: Not hard at all   Food Insecurity: No Food Insecurity    Worried About Running Out of Food in the Last Year: Never true    [address] in the Last Year: Never true   Transportation Needs: Unknown    Lack of Transportation (Medical): Not on file    Lack of Transportation (Non-Medical):  No   Physical Activity: Not on file   Stress: Not on file   Social Connections: Not on file   Intimate Partner Violence: Not on file   Housing Stability: Unknown    Unable to Pay for Housing in the Last Year: Not on file    Number

## 2023-08-31 DIAGNOSIS — R17 ELEVATED BILIRUBIN: Primary | ICD-10-CM

## 2023-08-31 DIAGNOSIS — R19.7 DIARRHEA, UNSPECIFIED TYPE: ICD-10-CM

## 2023-08-31 LAB
ALBUMIN SERPL-MCNC: 4.8 G/DL (ref 3.4–5)
ALBUMIN/GLOB SERPL: 2.2 {RATIO} (ref 1.1–2.2)
ALP SERPL-CCNC: 54 U/L (ref 40–129)
ALT SERPL-CCNC: 10 U/L (ref 10–40)
ANION GAP SERPL CALCULATED.3IONS-SCNC: 10 MMOL/L (ref 3–16)
AST SERPL-CCNC: 14 U/L (ref 15–37)
BILIRUB SERPL-MCNC: 2.1 MG/DL (ref 0–1)
BUN SERPL-MCNC: 17 MG/DL (ref 7–20)
CALCIUM SERPL-MCNC: 9.6 MG/DL (ref 8.3–10.6)
CHLORIDE SERPL-SCNC: 106 MMOL/L (ref 99–110)
CHOLEST SERPL-MCNC: 140 MG/DL (ref 0–199)
CO2 SERPL-SCNC: 24 MMOL/L (ref 21–32)
CREAT SERPL-MCNC: 0.6 MG/DL (ref 0.6–1.1)
CRP SERPL-MCNC: <3 MG/L (ref 0–5.1)
DGP IGG AB: <0.4 U/ML (ref 0–14)
GFR SERPLBLD CREATININE-BSD FMLA CKD-EPI: >60 ML/MIN/{1.73_M2}
GLUCOSE SERPL-MCNC: 100 MG/DL (ref 70–99)
HDLC SERPL-MCNC: 64 MG/DL (ref 40–60)
LDLC SERPL CALC-MCNC: 64 MG/DL
POTASSIUM SERPL-SCNC: 3.7 MMOL/L (ref 3.5–5.1)
PROT SERPL-MCNC: 7 G/DL (ref 6.4–8.2)
SODIUM SERPL-SCNC: 140 MMOL/L (ref 136–145)
T4 FREE SERPL-MCNC: 1.5 NG/DL (ref 0.9–1.8)
TRIGL SERPL-MCNC: 59 MG/DL (ref 0–150)
TSH SERPL DL<=0.005 MIU/L-ACNC: 2.94 UIU/ML (ref 0.27–4.2)
VLDLC SERPL CALC-MCNC: 12 MG/DL

## 2023-09-03 LAB — CALPROTECTIN STL-MCNT: 28 UG/G

## 2023-09-11 ENCOUNTER — HOSPITAL ENCOUNTER (OUTPATIENT)
Dept: ULTRASOUND IMAGING | Age: 33
Discharge: HOME | End: 2023-09-11
Payer: COMMERCIAL

## 2023-09-11 DIAGNOSIS — R17 ELEVATED BILIRUBIN: ICD-10-CM

## 2023-09-11 PROCEDURE — 76705 ECHO EXAM OF ABDOMEN: CPT

## 2023-09-12 DIAGNOSIS — R16.0 HEPATOMEGALY: Primary | ICD-10-CM

## 2023-09-12 RX ORDER — DICYCLOMINE HCL 20 MG
20 TABLET ORAL 4 TIMES DAILY
Qty: 120 TABLET | Refills: 1 | Status: SHIPPED | OUTPATIENT
Start: 2023-09-12

## 2023-09-25 DIAGNOSIS — F32.A ANXIETY AND DEPRESSION: ICD-10-CM

## 2023-09-25 DIAGNOSIS — F41.9 ANXIETY AND DEPRESSION: ICD-10-CM

## 2023-09-25 RX ORDER — CITALOPRAM 20 MG/1
20 TABLET ORAL DAILY
Qty: 30 TABLET | Refills: 0 | Status: SHIPPED | OUTPATIENT
Start: 2023-09-25 | End: 2023-10-26

## 2023-09-25 NOTE — TELEPHONE ENCOUNTER
.Refill Request     CONFIRM preferred pharmacy with the patient. If Mail Order Rx - Pend for 90 day refill. Last Seen: Last Seen Department: 8/30/2023  Last Seen by PCP: 8/30/2023    Last Written: 8-30-23 25 with 0     If no future appointment scheduled:  Review the last OV with PCP and review information for follow-up visit,  Route STAFF MESSAGE with patient name to the [facility] for scheduling with the following information:            -  Timing of next visit           -  Visit type ie Physical, OV, etc           -  Diagnoses/Reason ie. COPD, HTN - Do not use MEDICATION, Follow-up or CHECK UP - Give reason for visit      Next Appointment:   Future Appointments   Date Time Provider [facility] [address]   10/4/2023 11:00 AM LAURENCE Perez - DAVID       Message sent to "[employer] to schedule appt with patient? N/A      Requested Prescriptions     Pending Prescriptions Disp Refills    citalopram (CELEXA) 20 MG tablet [Pharmacy Med Name: CITALOPRAM HBR 20 MG TABLET] 25 tablet 0     Sig: TAKE 0.5 TABLETS BY MOUTH DAILY FOR 7 DAYS, THEN 1 TABLET DAILY FOR 21 DAYS.

## 2023-10-04 ENCOUNTER — OFFICE VISIT (OUTPATIENT)
Dept: FAMILY MEDICINE CLINIC | Age: 33
End: 2023-10-04
Payer: COMMERCIAL

## 2023-10-04 VITALS
BODY MASS INDEX: 41.04 KG/M2 | HEART RATE: 81 BPM | OXYGEN SATURATION: 98 % | SYSTOLIC BLOOD PRESSURE: 102 MMHG | DIASTOLIC BLOOD PRESSURE: 70 MMHG | WEIGHT: 262 LBS

## 2023-10-04 DIAGNOSIS — F41.9 ANXIETY AND DEPRESSION: Primary | ICD-10-CM

## 2023-10-04 DIAGNOSIS — Z12.4 CERVICAL CANCER SCREENING: ICD-10-CM

## 2023-10-04 DIAGNOSIS — Z23 NEED FOR TETANUS BOOSTER: ICD-10-CM

## 2023-10-04 DIAGNOSIS — F41.0 PANIC DISORDER: ICD-10-CM

## 2023-10-04 DIAGNOSIS — F32.A ANXIETY AND DEPRESSION: Primary | ICD-10-CM

## 2023-10-04 DIAGNOSIS — N64.4 BREAST TENDERNESS: ICD-10-CM

## 2023-10-04 DIAGNOSIS — Z23 NEED FOR INFLUENZA VACCINATION: ICD-10-CM

## 2023-10-04 LAB
CONTROL: NORMAL
PREGNANCY TEST URINE, POC: NORMAL

## 2023-10-04 PROCEDURE — 90674 CCIIV4 VAC NO PRSV 0.5 ML IM: CPT | Performed by: PHYSICIAN ASSISTANT

## 2023-10-04 PROCEDURE — 90472 IMMUNIZATION ADMIN EACH ADD: CPT | Performed by: PHYSICIAN ASSISTANT

## 2023-10-04 PROCEDURE — 99214 OFFICE O/P EST MOD 30 MIN: CPT | Performed by: PHYSICIAN ASSISTANT

## 2023-10-04 PROCEDURE — 81025 URINE PREGNANCY TEST: CPT | Performed by: PHYSICIAN ASSISTANT

## 2023-10-04 PROCEDURE — 90715 TDAP VACCINE 7 YRS/> IM: CPT | Performed by: PHYSICIAN ASSISTANT

## 2023-10-04 PROCEDURE — 90471 IMMUNIZATION ADMIN: CPT | Performed by: PHYSICIAN ASSISTANT

## 2023-10-04 RX ORDER — BUSPIRONE HYDROCHLORIDE 10 MG/1
10 TABLET ORAL 2 TIMES DAILY
Qty: 60 TABLET | Refills: 1 | Status: SHIPPED | OUTPATIENT
Start: 2023-10-04 | End: 2023-12-03

## 2023-10-04 ASSESSMENT — ENCOUNTER SYMPTOMS
NAUSEA: 0
VOMITING: 0

## 2023-10-04 NOTE — PROGRESS NOTES
Ian Mendiola (:  1990) is a 29 y.o. female,Established patient, here for evaluation of the following chief complaint(s): Anxiety (Possible increase medication ) and Depression         ASSESSMENT/PLAN:  1. Anxiety and depression  -     busPIRone (BUSPAR) 10 MG tablet; Take 1 tablet by mouth 2 times daily, Disp-60 tablet, R-1Normal  -  uncontrolled, will add on buspar to celexa. Medication risks, benefits and side effects discussed. Will give her a list of places she can go for ADHD testing  2. Panic disorder  -     busPIRone (BUSPAR) 10 MG tablet; Take 1 tablet by mouth 2 times daily, Disp-60 tablet, R-1Normal  -  uncontrolled, will add on buspar to celexa. Medication risks, benefits and side effects discussed. 3. Breast tenderness  -     POCT urine pregnancy: neg, will follow up with gyn and have a mammogram  4. Cervical cancer screening  -     [facility], [address] [facility], , Gynecology, [facility]  5. Need for influenza vaccination  -     Influenza, FLUCELVAX, (age 10 mo+), IM, Preservative Free, 0.5 mL  6. Need for tetanus booster  -     Tdap, BOOSTRIX, (age 8 yrs+), IM      Return in about 6 months (around 2024) for anxiety and depression. Subjective   SUBJECTIVE/OBJECTIVE:  HPI  Anxiety and depression:  Current medication: celexa (increased at the last appointment)  Side effects: none  Current symptoms: unable to get organized, irritable, vivid dreams, inattention, unable to complete tasks, disorganization  Denies: si/hi, behavioral issues  Reports that sleep is better, has noticed improvement in her anxiety  She has started a second job  Has never had formal evaluation for ADHD    Pt reports for the last month she has had bilateral breast tenderness and sensitive nipples  She has not tested for pregnancy but has a Mirena  She has an order for mammogram from her gyn    Review of Systems   Constitutional:  Negative for fatigue. Gastrointestinal:  Negative for nausea and vomiting.

## 2023-10-26 DIAGNOSIS — F41.9 ANXIETY AND DEPRESSION: ICD-10-CM

## 2023-10-26 DIAGNOSIS — F32.A ANXIETY AND DEPRESSION: ICD-10-CM

## 2023-10-26 RX ORDER — CITALOPRAM 20 MG/1
20 TABLET ORAL DAILY
Qty: 90 TABLET | Refills: 1 | Status: SHIPPED | OUTPATIENT
Start: 2023-10-26

## 2023-10-26 NOTE — TELEPHONE ENCOUNTER
Refill Request     CONFIRM preferred pharmacy with the patient. If Mail Order Rx - Pend for 90 day refill. Last Seen: Last Seen Department: 10/4/2023  Last Seen by PCP: 10/4/2023    Last Written: 9/25/2023    If no future appointment scheduled:  Review the last OV with PCP and review information for follow-up visit,  Route STAFF MESSAGE with patient name to the [facility] for scheduling with the following information:            -  Timing of next visit           -  Visit type ie Physical, OV, etc           -  Diagnoses/Reason ie. COPD, HTN - Do not use MEDICATION, Follow-up or CHECK UP - Give reason for visit      Next Appointment:   No future appointments. Message sent to [address] [facility] to schedule appt with patient?   NO      Requested Prescriptions     Pending Prescriptions Disp Refills    citalopram (CELEXA) 20 MG tablet [Pharmacy Med Name: CITALOPRAM HBR 20 MG TABLET] 30 tablet 0     Sig: TAKE 1 TABLET BY MOUTH EVERY DAY

## 2023-11-14 DIAGNOSIS — R09.81 NASAL CONGESTION: ICD-10-CM

## 2023-11-14 DIAGNOSIS — K21.9 HIATAL HERNIA WITH GERD: Primary | ICD-10-CM

## 2023-11-14 DIAGNOSIS — K44.9 HIATAL HERNIA WITH GERD: Primary | ICD-10-CM

## 2023-11-14 NOTE — TELEPHONE ENCOUNTER
Refill Request     CONFIRM preferred pharmacy with the patient. If Mail Order Rx - Pend for 90 day refill. Last Seen: Last Seen Department: 10/4/2023  Last Seen by PCP: 10/4/2023    Last Written: 9/12/2023    If no future appointment scheduled:  Review the last OV with PCP and review information for follow-up visit,  Route STAFF MESSAGE with patient name to the [facility] for scheduling with the following information:            -  Timing of next visit           -  Visit type ie Physical, OV, etc           -  Diagnoses/Reason ie. COPD, HTN - Do not use MEDICATION, Follow-up or CHECK UP - Give reason for visit      Next Appointment:   No future appointments. Message sent to [address] [facility] to schedule appt with patient?   NO      Requested Prescriptions     Pending Prescriptions Disp Refills    dicyclomine (BENTYL) 20 MG tablet 120 tablet 1     Sig: Take 1 tablet by mouth 4 times daily

## 2023-11-15 RX ORDER — FLUTICASONE PROPIONATE 50 MCG
2 SPRAY, SUSPENSION (ML) NASAL DAILY
Qty: 16 G | Refills: 1 | Status: SHIPPED | OUTPATIENT
Start: 2023-11-15

## 2023-11-15 RX ORDER — DICYCLOMINE HCL 20 MG
20 TABLET ORAL 4 TIMES DAILY
Qty: 120 TABLET | Refills: 1 | Status: SHIPPED | OUTPATIENT
Start: 2023-11-15

## 2023-12-04 DIAGNOSIS — F32.A ANXIETY AND DEPRESSION: ICD-10-CM

## 2023-12-04 DIAGNOSIS — F41.0 PANIC DISORDER: ICD-10-CM

## 2023-12-04 DIAGNOSIS — F41.9 ANXIETY AND DEPRESSION: ICD-10-CM

## 2023-12-04 RX ORDER — BUSPIRONE HYDROCHLORIDE 10 MG/1
10 TABLET ORAL 2 TIMES DAILY
Qty: 60 TABLET | Refills: 1 | Status: SHIPPED | OUTPATIENT
Start: 2023-12-04

## 2023-12-04 NOTE — TELEPHONE ENCOUNTER
Refill Request     CONFIRM preferred pharmacy with the patient. If Mail Order Rx - Pend for 90 day refill. Last Seen: Last Seen Department: 10/4/2023  Last Seen by PCP: 10/4/2023    Last Written: 10/4/2023    If no future appointment scheduled:  Review the last OV with PCP and review information for follow-up visit,  Route STAFF MESSAGE with patient name to the [facility] for scheduling with the following information:            -  Timing of next visit           -  Visit type ie Physical, OV, etc           -  Diagnoses/Reason ie. COPD, HTN - Do not use MEDICATION, Follow-up or CHECK UP - Give reason for visit      Next Appointment:   No future appointments. Message sent to [address] [place] [address] to schedule appt with patient?   NO      Requested Prescriptions     Pending Prescriptions Disp Refills    busPIRone (BUSPAR) 10 MG tablet [Pharmacy Med Name: BUSPIRONE HCL 10 MG TABLET] 60 tablet 1     Sig: TAKE 1 TABLET BY MOUTH TWICE A DAY

## 2024-01-04 ENCOUNTER — OFFICE VISIT (OUTPATIENT)
Dept: ORTHOPEDIC SURGERY | Age: 34
End: 2024-01-04

## 2024-01-04 DIAGNOSIS — S83.512A RUPTURE OF ANTERIOR CRUCIATE LIGAMENT OF LEFT KNEE, INITIAL ENCOUNTER: Primary | ICD-10-CM

## 2024-01-09 ENCOUNTER — TELEPHONE (OUTPATIENT)
Dept: ORTHOPEDIC SURGERY | Age: 34
End: 2024-01-09

## 2024-01-12 ENCOUNTER — PATIENT MESSAGE (OUTPATIENT)
Dept: ORTHOPEDIC SURGERY | Age: 34
End: 2024-01-12

## 2024-01-15 DIAGNOSIS — M94.262 CHONDROMALACIA OF LEFT KNEE: ICD-10-CM

## 2024-01-15 DIAGNOSIS — S83.272A COMPLEX TEAR OF LATERAL MENISCUS OF LEFT KNEE AS CURRENT INJURY, INITIAL ENCOUNTER: ICD-10-CM

## 2024-01-15 RX ORDER — MELOXICAM 15 MG/1
15 TABLET ORAL DAILY PRN
Qty: 30 TABLET | Refills: 0 | Status: SHIPPED | OUTPATIENT
Start: 2024-01-15

## 2024-01-18 ENCOUNTER — OFFICE VISIT (OUTPATIENT)
Dept: ORTHOPEDIC SURGERY | Age: 34
End: 2024-01-18

## 2024-01-18 VITALS — WEIGHT: 264 LBS | BODY MASS INDEX: 41.44 KG/M2 | HEIGHT: 67 IN

## 2024-01-18 DIAGNOSIS — S83.232A COMPLEX TEAR OF MEDIAL MENISCUS OF LEFT KNEE AS CURRENT INJURY, INITIAL ENCOUNTER: ICD-10-CM

## 2024-01-18 DIAGNOSIS — S83.512D RUPTURE OF ANTERIOR CRUCIATE LIGAMENT OF LEFT KNEE, SUBSEQUENT ENCOUNTER: Primary | ICD-10-CM
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
## 2024-01-19 ENCOUNTER — PATIENT MESSAGE (OUTPATIENT)
Dept: ORTHOPEDIC SURGERY | Age: 34
End: 2024-01-19

## 2024-01-23 ENCOUNTER — OFFICE VISIT (OUTPATIENT)
Dept: FAMILY MEDICINE CLINIC | Age: 34
End: 2024-01-23
Payer: COMMERCIAL

## 2024-01-23 VITALS
OXYGEN SATURATION: 99 % | WEIGHT: 273 LBS | SYSTOLIC BLOOD PRESSURE: 112 MMHG | DIASTOLIC BLOOD PRESSURE: 74 MMHG | HEART RATE: 74 BPM | TEMPERATURE: 97.4 F | BODY MASS INDEX: 42.85 KG/M2 | HEIGHT: 67 IN

## 2024-01-23 DIAGNOSIS — E66.01 CLASS 3 SEVERE OBESITY DUE TO EXCESS CALORIES WITHOUT SERIOUS COMORBIDITY WITH BODY MASS INDEX (BMI) OF 40.0 TO 44.9 IN ADULT (HCC): ICD-10-CM

## 2024-01-23 DIAGNOSIS — Z01.818 PRE-OP EXAMINATION: Primary | ICD-10-CM

## 2024-01-23 DIAGNOSIS — S83.512D RUPTURE OF ANTERIOR CRUCIATE LIGAMENT OF LEFT KNEE, SUBSEQUENT ENCOUNTER: ICD-10-CM

## 2024-01-23 DIAGNOSIS — Z01.818 PRE-OP EXAMINATION: ICD-10-CM

## 2024-01-23 PROCEDURE — 99214 OFFICE O/P EST MOD 30 MIN: CPT | Performed by: PHYSICIAN ASSISTANT

## 2024-01-23 ASSESSMENT — PATIENT HEALTH QUESTIONNAIRE - PHQ9
SUM OF ALL RESPONSES TO PHQ QUESTIONS 1-9: 20
SUM OF ALL RESPONSES TO PHQ QUESTIONS 1-9: 20
9. THOUGHTS THAT YOU WOULD BE BETTER OFF DEAD, OR OF HURTING YOURSELF: 0
1. LITTLE INTEREST OR PLEASURE IN DOING THINGS: 2
7. TROUBLE CONCENTRATING ON THINGS, SUCH AS READING THE NEWSPAPER OR WATCHING TELEVISION: 3
SUM OF ALL RESPONSES TO PHQ9 QUESTIONS 1 & 2: 4
4. FEELING TIRED OR HAVING LITTLE ENERGY: 3
5. POOR APPETITE OR OVEREATING: 3
3. TROUBLE FALLING OR STAYING ASLEEP: 3
2. FEELING DOWN, DEPRESSED OR HOPELESS: 2
SUM OF ALL RESPONSES TO PHQ QUESTIONS 1-9: 20
8. MOVING OR SPEAKING SO SLOWLY THAT OTHER PEOPLE COULD HAVE NOTICED. OR THE OPPOSITE, BEING SO FIGETY OR RESTLESS THAT YOU HAVE BEEN MOVING AROUND A LOT MORE THAN USUAL: 1
SUM OF ALL RESPONSES TO PHQ QUESTIONS 1-9: 20
10. IF YOU CHECKED OFF ANY PROBLEMS, HOW DIFFICULT HAVE THESE PROBLEMS MADE IT FOR YOU TO DO YOUR WORK, TAKE CARE OF THINGS AT HOME, OR GET ALONG WITH OTHER PEOPLE: 1
6. FEELING BAD ABOUT YOURSELF - OR THAT YOU ARE A FAILURE OR HAVE LET YOURSELF OR YOUR FAMILY DOWN: 3

## 2024-01-23 ASSESSMENT — ANXIETY QUESTIONNAIRES
4. TROUBLE RELAXING: 2
7. FEELING AFRAID AS IF SOMETHING AWFUL MIGHT HAPPEN: 2
6. BECOMING EASILY ANNOYED OR IRRITABLE: 3
IF YOU CHECKED OFF ANY PROBLEMS ON THIS QUESTIONNAIRE, HOW DIFFICULT HAVE THESE PROBLEMS MADE IT FOR YOU TO DO YOUR WORK, TAKE CARE OF THINGS AT HOME, OR GET ALONG WITH OTHER PEOPLE: SOMEWHAT DIFFICULT
1. FEELING NERVOUS, ANXIOUS, OR ON EDGE: 3
GAD7 TOTAL SCORE: 16
2. NOT BEING ABLE TO STOP OR CONTROL WORRYING: 2
5. BEING SO RESTLESS THAT IT IS HARD TO SIT STILL: 2
3. WORRYING TOO MUCH ABOUT DIFFERENT THINGS: 2

## 2024-01-23 NOTE — PROGRESS NOTES
prophylaxis: regimen to be chosen by surgical team  5.  Obesity: Patient limited in mobility. Please work on reducing portions at meal times and increasing lean protein and vegetables.  6. No contraindications to planned surgery.

## 2024-01-24 PROBLEM — E66.813 CLASS 3 SEVERE OBESITY DUE TO EXCESS CALORIES WITHOUT SERIOUS COMORBIDITY WITH BODY MASS INDEX (BMI) OF 40.0 TO 44.9 IN ADULT: Status: ACTIVE | Noted: 2020-08-26

## 2024-01-24 LAB
ANION GAP SERPL CALCULATED.3IONS-SCNC: 13 MMOL/L (ref 3–16)
BUN SERPL-MCNC: 13 MG/DL (ref 7–20)
CALCIUM SERPL-MCNC: 8.8 MG/DL (ref 8.3–10.6)
CHLORIDE SERPL-SCNC: 105 MMOL/L (ref 99–110)
CO2 SERPL-SCNC: 27 MMOL/L (ref 21–32)
CREAT SERPL-MCNC: 0.6 MG/DL (ref 0.6–1.1)
DEPRECATED RDW RBC AUTO: 13.4 % (ref 12.4–15.4)
GFR SERPLBLD CREATININE-BSD FMLA CKD-EPI: >60 ML/MIN/{1.73_M2}
GLUCOSE SERPL-MCNC: 87 MG/DL (ref 70–99)
HCT VFR BLD AUTO: 38.9 % (ref 36–48)
MCH RBC QN AUTO: 29 PG (ref 26–34)
MCHC RBC AUTO-ENTMCNC: 33.9 G/DL (ref 31–36)
MCV RBC AUTO: 85.5 FL (ref 80–100)
PLATELET # BLD AUTO: 276 K/UL (ref 135–450)
PMV BLD AUTO: 8.3 FL (ref 5–10.5)
POTASSIUM SERPL-SCNC: 4 MMOL/L (ref 3.5–5.1)
RBC # BLD AUTO: 4.55 M/UL (ref 4–5.2)
SODIUM SERPL-SCNC: 145 MMOL/L (ref 136–145)
WBC # BLD AUTO: 8.2 K/UL (ref 4–11)

## 2024-01-25 ENCOUNTER — HOSPITAL ENCOUNTER (OUTPATIENT)
Dept: PHYSICAL THERAPY | Age: 34
Setting detail: THERAPIES SERIES
Discharge: HOME | End: 2024-01-25
Payer: COMMERCIAL

## 2024-01-25 DIAGNOSIS — M25.662 KNEE STIFFNESS, LEFT: ICD-10-CM

## 2024-01-25 DIAGNOSIS — M25.562 ACUTE PAIN OF LEFT KNEE: Primary | ICD-10-CM

## 2024-01-25 PROCEDURE — 97110 THERAPEUTIC EXERCISES: CPT | Performed by: PHYSICAL THERAPIST

## 2024-01-25 PROCEDURE — 97161 PT EVAL LOW COMPLEX 20 MIN: CPT | Performed by: PHYSICAL THERAPIST

## 2024-01-25 PROCEDURE — G0283 ELEC STIM OTHER THAN WOUND: HCPCS | Performed by: PHYSICAL THERAPIST

## 2024-01-25 PROCEDURE — 97016 VASOPNEUMATIC DEVICE THERAPY: CPT | Performed by: PHYSICAL THERAPIST

## 2024-01-25 NOTE — PLAN OF CARE
[facility]- Out[facility]  [address], [place] [address] office: [phone] fax: [phone]     Physical Therapy Initial Evaluation Certification      Dear Trav Sotelo MD,    We had the pleasure of evaluating the following patient for physical therapy services at [facility].  A summary of our findings can be found in the initial assessment below.  This includes our plan of care.  If you have any questions or concerns regarding these findings, please do not hesitate to contact me at the office phone number listed above.  Thank you for the referral.     Physician Signature:_______________________________Date:__________________  By signing above (or electronic signature), therapist’s plan is approved by physician       Physical Therapy: TREATMENT/PROGRESS NOTE   Patient: Laney Pineda (33 y.o. female)   Examination Date: 2024   :  1990 MRN: [id number]   Visit #: 1   Insurance Allowable Auth Needed   60 []Yes    [x]No    Insurance: Payor: BCBS / Plan: [facility] [place] PPO / Product Type: *No Product type* /   Insurance ID: [id number] - ([facility])  Secondary Insurance (if applicable):    Treatment Diagnosis:     ICD-10-CM    1. Acute pain of left knee  M25.562       2. Knee stiffness, left  M25.662          Medical Diagnosis:  Rupture of anterior cruciate ligament of left knee, initial encounter [S83.512A]   Referring Physician: Trav Sotelo MD  PCP: Ashley Patel PA       Plan of care signed (Y/N): NO    Date of Patient follow up with Physician: 24     Progress Report/POC: EVAL today  POC update due: (10 visits /OR AUTH LIMITS, whichever is less)  2024                                             Precautions/ Contra-indications:           Latex allergy:  NO  Pacemaker:    NO  Contraindications for Manipulation: no ACL  Date of Surgery: scheduled 24 BPTB  Other:    Red

## 2024-01-26 ENCOUNTER — TELEPHONE (OUTPATIENT)
Dept: ORTHOPEDIC SURGERY | Age: 34
End: 2024-01-26

## 2024-01-26 NOTE — TELEPHONE ENCOUNTER
General Question     Subject: PRIOR AUTH   Patient and /or Facility Request: [facility] PRIOR AUTH   Contact Number: [phone]    [facility] FROM [facility] PRIOR AUTH CALLING REGARDING AN EXISTING PRIOR AUTH.     PLEASE CALL BACK T THE NUMBER ABOVE.

## 2024-01-28 ENCOUNTER — PATIENT MESSAGE (OUTPATIENT)
Dept: FAMILY MEDICINE CLINIC | Age: 34
End: 2024-01-28

## 2024-01-29 ENCOUNTER — HOSPITAL ENCOUNTER (OUTPATIENT)
Dept: PHYSICAL THERAPY | Age: 34
Setting detail: THERAPIES SERIES
Discharge: HOME | End: 2024-01-29
Payer: COMMERCIAL

## 2024-01-29 PROCEDURE — 97140 MANUAL THERAPY 1/> REGIONS: CPT | Performed by: PHYSICAL THERAPIST

## 2024-01-29 PROCEDURE — 97112 NEUROMUSCULAR REEDUCATION: CPT | Performed by: PHYSICAL THERAPIST

## 2024-01-29 PROCEDURE — 97016 VASOPNEUMATIC DEVICE THERAPY: CPT | Performed by: PHYSICAL THERAPIST

## 2024-01-29 RX ORDER — AMOXICILLIN AND CLAVULANATE POTASSIUM 875; 125 MG/1; MG/1
1 TABLET, FILM COATED ORAL 2 TIMES DAILY
Qty: 14 TABLET | Refills: 0 | Status: SHIPPED | OUTPATIENT
Start: 2024-01-29 | End: 2024-02-05

## 2024-01-29 NOTE — TELEPHONE ENCOUNTER
From: Laney Pineda  To: Ashley Patel  Sent: 1/28/2024 6:54 PM EST  Subject: Before surgery    I had my tooth pulled a couple days ago. It was hurting and broke. The dentist did xrays and there was infection under the tooth. My question is can I still get my surgery on the 7th or do I have to change it? The dentist said I should be ok by then but I wanted to make sure.

## 2024-01-29 NOTE — FLOWSHEET NOTE
Observation:    Test Measurements:     Test used Initial score  1/25/24 01/29/2024   Pain Summary VAS 2-8/10    Functional questionnaire LEFS (24) 70%    Other:                Exercises/Interventions     Therapeutic Ex (34971)  resistance Reps/Sets/time Notes/Cues/Progressions   Prone HSC LLE 2# 2x10 Focus ECC   HABD LLE 0# 2x10x3\"H and quick return                                                    Manual Intervention (13303)      ITB /VL roller stick 5'     Prone glute release 2'     Prone quad stretch LLE 2'                 NMR re-education (86216)   CUES NEEDED   Seated QS  5' Biphasic 2.0 ramp  10\"on/10\"off   SLR   6' Biphasic 2.0 ramp  10\"/10\"                     Therapeutic Activity (82321)                                        Modalities:    Electrical Stimulation: PreMod Applied to Knee Left for pain modulation and symptom control for 15 minutes  Vasopneumatic Compression (Game Ready): Applied to Knee Left for significant edema, swelling, pain control for 15 minutes.  Relevant ICD-10 R22.4 Localized swelling of lower limb.     Girth Left Right Post Vaso   Knee: Midpatella 50cm  49cm   Knee: 5 cm above 57 cm  55cm         Education/Home Exercise Program: Patient HEP program created electronically.  Refer to [employer] access code:      Access Code: [email or web address]  URL: [email or web address]  Date: 01/25/2024  Prepared by: Mirna Zavala    Exercises  - Seated Table Hamstring Stretch  - 1 x daily - 5 reps - 20 hold  - Long Sitting Calf Stretch with Strap  - 1 x daily - 5 reps - 20 hold  - Supine Quad Set  - 1 x daily - 3 sets - 10 reps - 10 hold  - Supine Active Straight Leg Raise  - 1 x daily - 3 sets - 10 reps - 3 hold  - Supine Heel Slide  - 1 x daily - 10 reps - 10 hold    ASSESSMENT   Assessment:   Laney Pineda is a 33 y.o. female presenting today to Outpatient PT with signs and symptoms consistent with L ACL tear w swelling and quad atrophy .       Pt. presents with the functional impairments and

## 2024-02-01 ENCOUNTER — HOSPITAL ENCOUNTER (OUTPATIENT)
Dept: PHYSICAL THERAPY | Age: 34
Setting detail: THERAPIES SERIES
Discharge: HOME | End: 2024-02-01
Payer: COMMERCIAL

## 2024-02-01 PROCEDURE — 97112 NEUROMUSCULAR REEDUCATION: CPT | Performed by: PHYSICAL THERAPIST

## 2024-02-01 PROCEDURE — 97110 THERAPEUTIC EXERCISES: CPT | Performed by: PHYSICAL THERAPIST

## 2024-02-01 NOTE — FLOWSHEET NOTE
performed to prevent loss of range of motion, maintain or improve muscular strength or increase flexibility, following either an injury or surgery.   (30707) NEUROMUSCULAR RE-EDUCATION - Therapeutic procedure, 1 or more areas, each 15 minutes; neuromuscular reeducation of movement, balance, coordination, kinesthetic sense, posture, and/or proprioception for sitting and/or standing activities      GOALS     Patient stated goal: to get back to work w/o restrictions   Status:  [] Progressing: [] Met: [] Not Met: [] Adjusted    Therapist goals for Patient:   Short Term Goals: To be achieved in: 2 weeks  Independent in HEP and progression per patient tolerance, in order to progress toward full function and prevent re-injury.    Status: [x] Progressing: [] Met: [] Not Met: [] Adjusted  Patient will have a decrease in pain to 2/10 to help facilitate improvement in movement, function, and ADLs as indicated by functional deficits.   Status: [x] Progressing: [] Met: [] Not Met: [] Adjusted    Long Term Goals: To be achieved in: 12 weeks (post op)  Disability index score of 35% or less for the LEFS to assist with return top prior level of function.   Status: [] Progressing: [] Met: [] Not Met: [] Adjusted  Improve knee AROM to 120-130 degrees or better to allow for proper joint functioning as indicated by patients functional deficits.  Status: [] Progressing: [] Met: [] Not Met: [] Adjusted  Pt to improve strength to 4+/5 or better of proximal hip, quadriceps, and hamstrings to allow for proper muscle and joint use in functional mobility, ADLs and prior level of function   Status: [] Progressing: [] Met: [] Not Met: [] Adjusted  Patient will return to  walk 1 mile  without increased symptoms or restriction to work towards return to prior level of function.        Status: [] Progressing: [] Met: [] Not Met: [] Adjusted  Patient will be able to manage symptoms while resuming full duty at work.             Status: [] Progressing:

## 2024-02-06 ENCOUNTER — PATIENT MESSAGE (OUTPATIENT)
Dept: ORTHOPEDIC SURGERY | Age: 34
End: 2024-02-06

## 2024-02-06 NOTE — TELEPHONE ENCOUNTER
From: Laney Pineda  To: Dr. Trav Sotelo  Sent: 2/6/2024 8:32 AM EST  Subject: Medication    I just wanted to know if I can take any medication today with my surgery being tomorrow? I just have a bad headache and was going to take Tylenol. Or should I take something else?

## 2024-02-07 ENCOUNTER — ANESTHESIA EVENT (OUTPATIENT)
Dept: OPERATING ROOM | Age: 34
End: 2024-02-07
Payer: COMMERCIAL

## 2024-02-07 ENCOUNTER — HOSPITAL ENCOUNTER (OUTPATIENT)
Age: 34
Setting detail: OUTPATIENT SURGERY
Discharge: HOME | End: 2024-02-07
Attending: ORTHOPAEDIC SURGERY | Admitting: ORTHOPAEDIC SURGERY
Payer: COMMERCIAL

## 2024-02-07 ENCOUNTER — ANESTHESIA (OUTPATIENT)
Dept: OPERATING ROOM | Age: 34
End: 2024-02-07
Payer: COMMERCIAL

## 2024-02-07 ENCOUNTER — APPOINTMENT (OUTPATIENT)
Dept: GENERAL RADIOLOGY | Age: 34
End: 2024-02-07
Attending: ORTHOPAEDIC SURGERY
Payer: COMMERCIAL

## 2024-02-07 VITALS
TEMPERATURE: 97.9 F | SYSTOLIC BLOOD PRESSURE: 143 MMHG | HEIGHT: 67 IN | HEART RATE: 94 BPM | BODY MASS INDEX: 41.59 KG/M2 | DIASTOLIC BLOOD PRESSURE: 98 MMHG | WEIGHT: 265 LBS | OXYGEN SATURATION: 99 % | RESPIRATION RATE: 13 BRPM

## 2024-02-07 DIAGNOSIS — Z47.89 ORTHOPEDIC AFTERCARE: Primary | ICD-10-CM

## 2024-02-07 LAB — HCG UR QL: NEGATIVE

## 2024-02-07 PROCEDURE — 7100000000 HC PACU RECOVERY - FIRST 15 MIN: Performed by: ORTHOPAEDIC SURGERY

## 2024-02-07 PROCEDURE — 7100000011 HC PHASE II RECOVERY - ADDTL 15 MIN: Performed by: ORTHOPAEDIC SURGERY

## 2024-02-07 PROCEDURE — 3700000000 HC ANESTHESIA ATTENDED CARE: Performed by: ORTHOPAEDIC SURGERY

## 2024-02-07 PROCEDURE — 6360000002 HC RX W HCPCS: Performed by: ORTHOPAEDIC SURGERY

## 2024-02-07 PROCEDURE — 7100000001 HC PACU RECOVERY - ADDTL 15 MIN: Performed by: ORTHOPAEDIC SURGERY

## 2024-02-07 PROCEDURE — 2500000003 HC RX 250 WO HCPCS: Performed by: NURSE ANESTHETIST, CERTIFIED REGISTERED

## 2024-02-07 PROCEDURE — 6370000000 HC RX 637 (ALT 250 FOR IP): Performed by: ANESTHESIOLOGY

## 2024-02-07 PROCEDURE — 84703 CHORIONIC GONADOTROPIN ASSAY: CPT

## 2024-02-07 PROCEDURE — 7100000010 HC PHASE II RECOVERY - FIRST 15 MIN: Performed by: ORTHOPAEDIC SURGERY

## 2024-02-07 PROCEDURE — 2500000003 HC RX 250 WO HCPCS: Performed by: ORTHOPAEDIC SURGERY

## 2024-02-07 PROCEDURE — 2709999900 HC NON-CHARGEABLE SUPPLY: Performed by: ORTHOPAEDIC SURGERY

## 2024-02-07 PROCEDURE — 73560 X-RAY EXAM OF KNEE 1 OR 2: CPT

## 2024-02-07 PROCEDURE — 2720000010 HC SURG SUPPLY STERILE: Performed by: ORTHOPAEDIC SURGERY

## 2024-02-07 PROCEDURE — 6360000002 HC RX W HCPCS: Performed by: NURSE ANESTHETIST, CERTIFIED REGISTERED

## 2024-02-07 PROCEDURE — C1769 GUIDE WIRE: HCPCS | Performed by: ORTHOPAEDIC SURGERY

## 2024-02-07 PROCEDURE — 3700000001 HC ADD 15 MINUTES (ANESTHESIA): Performed by: ORTHOPAEDIC SURGERY

## 2024-02-07 PROCEDURE — C1713 ANCHOR/SCREW BN/BN,TIS/BN: HCPCS | Performed by: ORTHOPAEDIC SURGERY

## 2024-02-07 PROCEDURE — 2580000003 HC RX 258: Performed by: ANESTHESIOLOGY

## 2024-02-07 PROCEDURE — 6360000002 HC RX W HCPCS: Performed by: ANESTHESIOLOGY

## 2024-02-07 PROCEDURE — 2580000003 HC RX 258: Performed by: ORTHOPAEDIC SURGERY

## 2024-02-07 PROCEDURE — 64447 NJX AA&/STRD FEMORAL NRV IMG: CPT | Performed by: ANESTHESIOLOGY

## 2024-02-07 PROCEDURE — 3600000014 HC SURGERY LEVEL 4 ADDTL 15MIN: Performed by: ORTHOPAEDIC SURGERY

## 2024-02-07 PROCEDURE — 2500000003 HC RX 250 WO HCPCS: Performed by: ANESTHESIOLOGY

## 2024-02-07 PROCEDURE — A4217 STERILE WATER/SALINE, 500 ML: HCPCS | Performed by: ORTHOPAEDIC SURGERY

## 2024-02-07 PROCEDURE — 3600000004 HC SURGERY LEVEL 4 BASE: Performed by: ORTHOPAEDIC SURGERY

## 2024-02-07 DEVICE — SCREW INTFR L20MM DIA7MM CANN W/ SHTH: Type: IMPLANTABLE DEVICE | Site: KNEE | Status: FUNCTIONAL

## 2024-02-07 DEVICE — IMPLANTABLE DEVICE: Type: IMPLANTABLE DEVICE | Site: KNEE | Status: FUNCTIONAL

## 2024-02-07 RX ORDER — OXYCODONE HYDROCHLORIDE 5 MG/1
5 TABLET ORAL PRN
Status: COMPLETED | OUTPATIENT
Start: 2024-02-07 | End: 2024-02-07

## 2024-02-07 RX ORDER — HYDROMORPHONE HYDROCHLORIDE 2 MG/ML
INJECTION, SOLUTION INTRAMUSCULAR; INTRAVENOUS; SUBCUTANEOUS PRN
Status: DISCONTINUED | OUTPATIENT
Start: 2024-02-07 | End: 2024-02-07 | Stop reason: SDUPTHER

## 2024-02-07 RX ORDER — ACETAMINOPHEN 500 MG
500 TABLET ORAL EVERY 6 HOURS PRN
COMMUNITY

## 2024-02-07 RX ORDER — ONDANSETRON 2 MG/ML
INJECTION INTRAMUSCULAR; INTRAVENOUS PRN
Status: DISCONTINUED | OUTPATIENT
Start: 2024-02-07 | End: 2024-02-07 | Stop reason: SDUPTHER

## 2024-02-07 RX ORDER — MEPERIDINE HYDROCHLORIDE 50 MG/ML
12.5 INJECTION INTRAMUSCULAR; INTRAVENOUS; SUBCUTANEOUS EVERY 5 MIN PRN
Status: DISCONTINUED | OUTPATIENT
Start: 2024-02-07 | End: 2024-02-07 | Stop reason: HOSPADM

## 2024-02-07 RX ORDER — ONDANSETRON 2 MG/ML
4 INJECTION INTRAMUSCULAR; INTRAVENOUS
Status: DISCONTINUED | OUTPATIENT
Start: 2024-02-07 | End: 2024-02-07 | Stop reason: HOSPADM

## 2024-02-07 RX ORDER — LIDOCAINE HYDROCHLORIDE 20 MG/ML
INJECTION, SOLUTION INFILTRATION; PERINEURAL PRN
Status: DISCONTINUED | OUTPATIENT
Start: 2024-02-07 | End: 2024-02-07 | Stop reason: SDUPTHER

## 2024-02-07 RX ORDER — TRANEXAMIC ACID 100 MG/ML
1000 INJECTION, SOLUTION INTRAVENOUS ONCE
Status: COMPLETED | OUTPATIENT
Start: 2024-02-07 | End: 2024-02-07

## 2024-02-07 RX ORDER — OXYCODONE HYDROCHLORIDE 5 MG/1
5 TABLET ORAL EVERY 6 HOURS PRN
Qty: 20 TABLET | Refills: 0 | Status: SHIPPED | OUTPATIENT
Start: 2024-02-07 | End: 2024-02-12

## 2024-02-07 RX ORDER — DEXAMETHASONE SODIUM PHOSPHATE 10 MG/ML
INJECTION INTRAMUSCULAR; INTRAVENOUS PRN
Status: DISCONTINUED | OUTPATIENT
Start: 2024-02-07 | End: 2024-02-07 | Stop reason: SDUPTHER

## 2024-02-07 RX ORDER — KETOROLAC TROMETHAMINE 30 MG/ML
INJECTION, SOLUTION INTRAMUSCULAR; INTRAVENOUS PRN
Status: DISCONTINUED | OUTPATIENT
Start: 2024-02-07 | End: 2024-02-07 | Stop reason: SDUPTHER

## 2024-02-07 RX ORDER — ROPIVACAINE HYDROCHLORIDE 5 MG/ML
INJECTION, SOLUTION EPIDURAL; INFILTRATION; PERINEURAL
Status: COMPLETED | OUTPATIENT
Start: 2024-02-07 | End: 2024-02-07

## 2024-02-07 RX ORDER — HYDROMORPHONE HYDROCHLORIDE 1 MG/ML
0.5 INJECTION, SOLUTION INTRAMUSCULAR; INTRAVENOUS; SUBCUTANEOUS EVERY 5 MIN PRN
Status: COMPLETED | OUTPATIENT
Start: 2024-02-07 | End: 2024-02-07

## 2024-02-07 RX ORDER — SODIUM CHLORIDE, SODIUM LACTATE, POTASSIUM CHLORIDE, CALCIUM CHLORIDE 600; 310; 30; 20 MG/100ML; MG/100ML; MG/100ML; MG/100ML
INJECTION, SOLUTION INTRAVENOUS CONTINUOUS
Status: DISCONTINUED | OUTPATIENT
Start: 2024-02-07 | End: 2024-02-07 | Stop reason: HOSPADM

## 2024-02-07 RX ORDER — SODIUM CHLORIDE 0.9 % (FLUSH) 0.9 %
5-40 SYRINGE (ML) INJECTION EVERY 12 HOURS SCHEDULED
Status: DISCONTINUED | OUTPATIENT
Start: 2024-02-07 | End: 2024-02-07 | Stop reason: HOSPADM

## 2024-02-07 RX ORDER — ROCURONIUM BROMIDE 10 MG/ML
INJECTION, SOLUTION INTRAVENOUS PRN
Status: DISCONTINUED | OUTPATIENT
Start: 2024-02-07 | End: 2024-02-07 | Stop reason: SDUPTHER

## 2024-02-07 RX ORDER — OXYCODONE HYDROCHLORIDE 5 MG/1
10 TABLET ORAL PRN
Status: COMPLETED | OUTPATIENT
Start: 2024-02-07 | End: 2024-02-07

## 2024-02-07 RX ORDER — SODIUM CHLORIDE 0.9 % (FLUSH) 0.9 %
5-40 SYRINGE (ML) INJECTION PRN
Status: DISCONTINUED | OUTPATIENT
Start: 2024-02-07 | End: 2024-02-07 | Stop reason: HOSPADM

## 2024-02-07 RX ORDER — TRANEXAMIC ACID 100 MG/ML
1000 INJECTION, SOLUTION INTRAVENOUS
Status: DISCONTINUED | OUTPATIENT
Start: 2024-02-07 | End: 2024-02-07 | Stop reason: HOSPADM

## 2024-02-07 RX ORDER — PROPOFOL 10 MG/ML
INJECTION, EMULSION INTRAVENOUS PRN
Status: DISCONTINUED | OUTPATIENT
Start: 2024-02-07 | End: 2024-02-07 | Stop reason: SDUPTHER

## 2024-02-07 RX ORDER — HYDROMORPHONE HYDROCHLORIDE 1 MG/ML
0.25 INJECTION, SOLUTION INTRAMUSCULAR; INTRAVENOUS; SUBCUTANEOUS EVERY 5 MIN PRN
Status: DISCONTINUED | OUTPATIENT
Start: 2024-02-07 | End: 2024-02-07 | Stop reason: HOSPADM

## 2024-02-07 RX ORDER — LIDOCAINE HYDROCHLORIDE 10 MG/ML
1 INJECTION, SOLUTION EPIDURAL; INFILTRATION; INTRACAUDAL; PERINEURAL
Status: DISCONTINUED | OUTPATIENT
Start: 2024-02-07 | End: 2024-02-07 | Stop reason: HOSPADM

## 2024-02-07 RX ORDER — FENTANYL CITRATE 50 UG/ML
INJECTION, SOLUTION INTRAMUSCULAR; INTRAVENOUS PRN
Status: DISCONTINUED | OUTPATIENT
Start: 2024-02-07 | End: 2024-02-07 | Stop reason: SDUPTHER

## 2024-02-07 RX ORDER — SODIUM CHLORIDE 9 MG/ML
INJECTION, SOLUTION INTRAVENOUS PRN
Status: DISCONTINUED | OUTPATIENT
Start: 2024-02-07 | End: 2024-02-07 | Stop reason: HOSPADM

## 2024-02-07 RX ORDER — LABETALOL HYDROCHLORIDE 5 MG/ML
10 INJECTION, SOLUTION INTRAVENOUS
Status: DISCONTINUED | OUTPATIENT
Start: 2024-02-07 | End: 2024-02-07 | Stop reason: HOSPADM

## 2024-02-07 RX ORDER — DIPHENHYDRAMINE HYDROCHLORIDE 50 MG/ML
12.5 INJECTION INTRAMUSCULAR; INTRAVENOUS
Status: DISCONTINUED | OUTPATIENT
Start: 2024-02-07 | End: 2024-02-07 | Stop reason: HOSPADM

## 2024-02-07 RX ADMIN — OXYCODONE HYDROCHLORIDE 10 MG: 5 TABLET ORAL at 14:14

## 2024-02-07 RX ADMIN — SUGAMMADEX 200 MG: 100 INJECTION, SOLUTION INTRAVENOUS at 12:45

## 2024-02-07 RX ADMIN — HYDROMORPHONE HYDROCHLORIDE 0.5 MG: 1 INJECTION, SOLUTION INTRAMUSCULAR; INTRAVENOUS; SUBCUTANEOUS at 13:36

## 2024-02-07 RX ADMIN — SODIUM CHLORIDE, POTASSIUM CHLORIDE, SODIUM LACTATE AND CALCIUM CHLORIDE: 600; 310; 30; 20 INJECTION, SOLUTION INTRAVENOUS at 09:48

## 2024-02-07 RX ADMIN — ROCURONIUM BROMIDE 50 MG: 50 INJECTION, SOLUTION INTRAVENOUS at 10:24

## 2024-02-07 RX ADMIN — HYDROMORPHONE HYDROCHLORIDE 0.5 MG: 1 INJECTION, SOLUTION INTRAMUSCULAR; INTRAVENOUS; SUBCUTANEOUS at 14:08

## 2024-02-07 RX ADMIN — HYDROMORPHONE HYDROCHLORIDE 0.5 MG: 1 INJECTION, SOLUTION INTRAMUSCULAR; INTRAVENOUS; SUBCUTANEOUS at 13:48

## 2024-02-07 RX ADMIN — LIDOCAINE HYDROCHLORIDE 60 MG: 20 INJECTION, SOLUTION INFILTRATION; PERINEURAL at 10:00

## 2024-02-07 RX ADMIN — HYDROMORPHONE HYDROCHLORIDE 0.5 MG: 1 INJECTION, SOLUTION INTRAMUSCULAR; INTRAVENOUS; SUBCUTANEOUS at 13:59

## 2024-02-07 RX ADMIN — FENTANYL CITRATE 50 MCG: 50 INJECTION, SOLUTION INTRAMUSCULAR; INTRAVENOUS at 11:39

## 2024-02-07 RX ADMIN — ROPIVACAINE HYDROCHLORIDE 30 ML: 5 INJECTION EPIDURAL; INFILTRATION; PERINEURAL at 10:00

## 2024-02-07 RX ADMIN — ONDANSETRON 4 MG: 2 INJECTION INTRAMUSCULAR; INTRAVENOUS at 10:35

## 2024-02-07 RX ADMIN — TRANEXAMIC ACID 1000 MG: 100 INJECTION, SOLUTION INTRAVENOUS at 10:35

## 2024-02-07 RX ADMIN — DEXAMETHASONE SODIUM PHOSPHATE 10 MG: 10 INJECTION INTRAMUSCULAR; INTRAVENOUS at 10:35

## 2024-02-07 RX ADMIN — HYDROMORPHONE HYDROCHLORIDE 1 MG: 2 INJECTION, SOLUTION INTRAMUSCULAR; INTRAVENOUS; SUBCUTANEOUS at 13:18

## 2024-02-07 RX ADMIN — PROPOFOL 200 MG: 10 INJECTION, EMULSION INTRAVENOUS at 10:24

## 2024-02-07 RX ADMIN — HYDROMORPHONE HYDROCHLORIDE 1 MG: 2 INJECTION, SOLUTION INTRAMUSCULAR; INTRAVENOUS; SUBCUTANEOUS at 13:15

## 2024-02-07 RX ADMIN — Medication 3000 MG: at 10:35

## 2024-02-07 RX ADMIN — FENTANYL CITRATE 50 MCG: 50 INJECTION, SOLUTION INTRAMUSCULAR; INTRAVENOUS at 12:21

## 2024-02-07 RX ADMIN — PROPOFOL 100 MG: 10 INJECTION, EMULSION INTRAVENOUS at 10:00

## 2024-02-07 RX ADMIN — LIDOCAINE HYDROCHLORIDE 100 MG: 20 INJECTION, SOLUTION INFILTRATION; PERINEURAL at 10:24

## 2024-02-07 RX ADMIN — KETOROLAC TROMETHAMINE 30 MG: 30 INJECTION, SOLUTION INTRAMUSCULAR; INTRAVENOUS at 12:45

## 2024-02-07 ASSESSMENT — PAIN DESCRIPTION - DESCRIPTORS
DESCRIPTORS: ACHING
DESCRIPTORS: ACHING
DESCRIPTORS: ACHING;DISCOMFORT;GNAWING

## 2024-02-07 ASSESSMENT — PAIN SCALES - GENERAL
PAINLEVEL_OUTOF10: 10
PAINLEVEL_OUTOF10: 10
PAINLEVEL_OUTOF10: 8
PAINLEVEL_OUTOF10: 9
PAINLEVEL_OUTOF10: 7

## 2024-02-07 ASSESSMENT — PAIN DESCRIPTION - ORIENTATION
ORIENTATION: LEFT

## 2024-02-07 ASSESSMENT — PAIN - FUNCTIONAL ASSESSMENT: PAIN_FUNCTIONAL_ASSESSMENT: 0-10

## 2024-02-07 ASSESSMENT — ENCOUNTER SYMPTOMS: DYSPNEA ACTIVITY LEVEL: AFTER AMBULATING 2 FLIGHTS OF STAIRS

## 2024-02-07 ASSESSMENT — PAIN DESCRIPTION - LOCATION
LOCATION: KNEE

## 2024-02-07 NOTE — ANESTHESIA PROCEDURE NOTES
Peripheral Block    Patient location during procedure: pre-op  Reason for block: post-op pain management and at surgeon's request  Start time: 2/7/2024 10:00 AM  End time: 2/7/2024 10:12 AM  Staffing  Anesthesiologist: Shin Feliz DO  Performed by: Shin Feliz DO  Authorized by: Shin Feliz DO    Preanesthetic Checklist  Completed: patient identified, IV checked, site marked, risks and benefits discussed, surgical/procedural consents, equipment checked, pre-op evaluation, timeout performed, anesthesia consent given and oxygen available  Peripheral Block   Patient position: supine  Prep: ChloraPrep  Provider prep: mask and sterile gloves  Patient monitoring: cardiac monitor, continuous pulse ox, oxygen, IV access and responsive to questions  Block type: Femoral  Adductor canal  Laterality: left  Injection technique: single-shot  Guidance: ultrasound guided    Needle   Needle type: insulated echogenic nerve stimulator needle   Needle gauge: 21 G  Needle localization: ultrasound guidance  Test dose: negative  Needle length: 10 cm  Assessment   Injection assessment: negative aspiration for heme, no paresthesia on injection, local visualized surrounding nerve on ultrasound, no intravascular symptoms and low pressure verified by pressure monitor  Paresthesia pain: none  Slow fractionated injection: yes  Hemodynamics: stableno  Outcomes: uncomplicated and patient tolerated procedure well    Medications Administered  ropivacaine (NAROPIN) injection 0.5% - Perineural   30 mL - 2/7/2024 10:00:00 AM

## 2024-02-07 NOTE — OP NOTE
Operative Note      Patient: Laney Pineda  YOB: 1990  MRN: [id number]    Date of Procedure: 2/7/2024    Pre-Op Diagnosis Codes:     * Tear of anterior cruciate ligament, complete, left, subsequent encounter [S83.512D]     * Complex tear of medial meniscus of left knee as current injury, initial encounter [S83.232A]    Post-Op Diagnosis:  Complete ACL tear left knee, complex lateral meniscus tear left knee       Procedure(s):  VIDEO ARTHROSCOPY LEFT KNEE, ANTERIOR CRUCIATE LIGAMENT RECONSTRUCTION WITH BONE TENDON BONE AUTOGRAFT, LATERAL MENISCUS REPAIR,    Surgeon(s):  Trav Sotelo MD    Assistant:   Surgical Assistant: Manuela Srinivasan    Anesthesia: General    Estimated Blood Loss (mL): Minimal    Complications: None    Specimens:   * No specimens in log *    Implants:  Implant Name Type Inv. Item Serial No.  Lot No. LRB No. Used Action   FiberStitch implant    [employer] [id number] Left 1 Implanted   FiberStitch  Implant    [employer] [id number] Left 1 Implanted   SCREW INTFR L20MM DIA7MM CARMEN W/ SHTH - [id number]  SCREW INTFR L20MM DIA7MM CARMEN W/ SHTH  [employer] [id number] Left 1 Implanted   SCREW INTFR L20MM DIA9MM CARMEN W/ SHTH - [id number]  SCREW INTFR L20MM DIA9MM CARMEN W/ SHTH  [employer] [id number] Left 1 Implanted         Drains: * No LDAs found *    Findings: EUA 2B Lachman, equal and symmetric varus valgus stability at 0 and 30 degrees.  Arthroscopic: Complete ACL tear.  No gross medial meniscus tear, grade III chondromalacia patella, grade 1/2 changes medial compartment, grade 2/3 changes lateral compartment small fissure on the lateral tibial plateau.  White white zone E3 small radial tear.  Complex posterior horn lateral meniscus near complete vertical tear zone F1/2 at 15 mm    Detailed Description of Procedure:       OPERATIVE INDICATIONS: Patient is a 33 y.o. female status post left knee injury sustaining an ACL tear and suspected meniscus injury. After

## 2024-02-07 NOTE — H&P
ORTHOPAEDIC SURGERY INTERVAL H&P    Laney Pineda was seen in the preoperative area, where a history and physical examination was reviewed and the patient was examined by me today. There have been no significant clinical changes since the completion of the previous recorded history and physical as well as recent progress notes dated Dec 2023.  The surgical site was confirmed by the patient and me and the surgical site was marked.     The risks, benefits, and alternatives of the proposed procedure(s) have been explained to the patient (or appropriately confirmed guardian) and understanding was verbalized. Please see outpatient notes for details.  All questions were answered and a signed documented consent has been placed in the patient's chart. The patient wishes to proceed.  On call to the OR.      Electronically signed by: Trav Sotelo MD,2/7/2024,9:43 AM         Trav Sotelo MD       Orthopaedic Surgery-Sports Medicine      Disclaimer:  This note was dictated with voice recognition software.  Though review and correction are routinely performed, please contact the office/medical records for any errors requiring correction.

## 2024-02-07 NOTE — PROGRESS NOTES
Date and time of surgery :  2/7/24 at 1025            Arrival Time: 825      Bring Picture ID and insurance card.  Please wear simple, loose fitting clothing to the hospital.   Do not bring valuables (money, credit cards, checkbooks, etc.)   Do not wear any makeup (including  eye makeup) and no nail polish or artificial nails on your fingers or toes.  DO NOT wear any jewelry or piercings on day of surgery.  All body piercing jewelry must be removed.  If you have dentures, they will be removed before going to the OR; we will provide you a container.  If you wear contact lenses or glasses, they will be removed; please bring a case for them.  Shower the evening before or morning of surgery with antibacterial soap.  Nothing to eat or drink after midnight the day before surgery.   You may brush your teeth and gargle the morning of surgery.  DO NOT SWALLOW WATER.   Do not take any morning meds the day of your surgery.  Aspirin, Ibuprofen, Advil, Naproxen, Vitamin E and other Anti-inflammatory products and supplements should be stopped for 5 -7days before surgery or as directed by your physician. Stop Mobic now  Do not smoke or drink any alcoholic beverages 24 hours prior to surgery.  This includes NA Beer. Refrain from the usage of any recreational drugs, including non-prescribed prescription drugs.   You MUST plan for a responsible adult to stay on site while you are here and take you home after your surgery. You will not be allowed to leave alone or drive yourself home. It is strongly suggested someone stay with you the first 24 hrs. Your surgery will be cancelled if you do not have a ride home.  To help prevent infection, change your sheets the night before surgery.   If you  have a Living Will and Durable Power of  for Healthcare, please bring in a copy.  Notify your Surgeon if you develop any illness between now and time of surgery. Cough, cold, fever, sore throat, nausea, vomiting, etc.  Please notify your 
Patient states she has crutches and knows how to use them  
Pre and post operative expectations and routines explained to patient, verbalized understanding.  
Pt arrived to pacu - VSS  Pt on RA.  
__________________________________    Orders: Hard copy/ EPIC                 Transcribed/ EPIC              _______Wt.    ________Pharmacy                         _______SCD                      ______TED HOSE    COVID + _______DATE    ___OK per Anesthesia   ____OK per Surgeon

## 2024-02-07 NOTE — ANESTHESIA PRE PROCEDURE
03:37 PM    AGRATIO 2.2 08/30/2023 10:13 AM    LABGLOM >60 01/23/2024 02:00 PM    GLUCOSE 87 01/23/2024 02:00 PM    PROT 7.0 08/30/2023 10:13 AM    CALCIUM 8.8 01/23/2024 02:00 PM    BILITOT 2.1 08/30/2023 10:13 AM    ALKPHOS 54 08/30/2023 10:13 AM    AST 14 08/30/2023 10:13 AM    ALT 10 08/30/2023 10:13 AM       POC Tests: No results for input(s): \"POCGLU\", \"POCNA\", \"POCK\", \"POCCL\", \"POCBUN\", \"POCHEMO\", \"POCHCT\" in the last 72 hours.    Coags: No results found for: \"PROTIME\", \"INR\", \"APTT\"    HCG (If Applicable):   Lab Results   Component Value Date    PREGTESTUR neg 10/04/2023        ABGs: No results found for: \"PHART\", \"PO2ART\", \"AMM5KTV\", \"CMY6PWJ\", \"BEART\", \"Y5ZPDSEH\"     Type & Screen (If Applicable):  No results found for: \"LABABO\", \"LABRH\"    Drug/Infectious Status (If Applicable):  No results found for: \"HIV\", \"HEPCAB\"    COVID-19 Screening (If Applicable):   Lab Results   Component Value Date/Time    COVID19 Detected 02/21/2023 01:31 AM    COVID19 NOT DETECTED 02/05/2021 11:07 AM           Anesthesia Evaluation  Patient summary reviewed and Nursing notes reviewed  Airway: Mallampati: II  TM distance: <3 FB   Neck ROM: full  Mouth opening: < 3 FB   Dental:          Pulmonary:   (+)       decreased breath sounds        (-) wheezes and rales                           Cardiovascular:  Exercise tolerance: poor (<4 METS)  (+) LOWE: after ambulating 2 flights of stairs    (-) hypertension, past MI and orthopnea      Rhythm: regular  Rate: normal                    Neuro/Psych:   (+) headaches:depression/anxiety    (-) seizures, TIA and CVA           GI/Hepatic/Renal:   (+) hiatal hernia, GERD:, morbid obesity     (-) no renal disease       Endo/Other:        (-) diabetes mellitus, hypothyroidism, hyperthyroidism               Abdominal:   (+) obese          Vascular:          Other Findings:             Anesthesia Plan      general and regional     ASA 3       Induction: intravenous.  continuous noninvasive

## 2024-02-07 NOTE — DISCHARGE INSTRUCTIONS
Orthopaedic Sports Medicine  Post-Operative Discharge Instructions      Pain Medication/Management  - Narcotic electronic-scribed to pharmacy listed in EPIC (Follow prescription instructions)  - IF taking a narcotic with acetaminophen then do not take additional acetaminophen.  IF not, then Tylenol (650mg) - take 1 tab every 8 hours x 1 week  - Enteric Coated Aspirin (325mg) Over The Counter - take 1 tab daily x 3 weeks with food -  Start in AM on 2/8/24  - Colace (100mg) Over The Counter - take 1 tab twice daily as needed for a stool softener  * Wean off narcotic and start Tylenol (500mg) Over The Counter - take 1-2 tabs every 8 hours as needed for pain    Non weightbearing x 24hrs then 25% partial weightbearing left leg, use knee brace at all times except for hygiene.  Ice left knee.  Elevate left ankle above left knee and pump ankle up and down to circulate blood.  May start -10 to 90 range of motion on 2/8/24 and may shower on 2/12/24, pat wounds dry air dry and then place gauze and ACE wrap to cover all wounds and then return to brace use.  Bridging knee extension exercises a must.  Call PT in 24hrs to activate PT referral to start PT on 2/12/24.  Check all paperwork for surgeon's follow up appointment date and time.  Call office [phone] with any questions or concerns      * Day of Surgery - If you had a regional nerve block (extremity was numbed by anesthesia), it will wear off in 6-16 hours after surgery.  It is recommended that you start the narcotic prescription once you get home to stay ahead of pain control even if the nerve block has not worn off yet.  This will help limit severe pain from waking you up.  It is also reasonable to set your alarm for every 6 hours so that you don't get behind in your pain control.    * Elevation and Ice - For lower extremity surgery, elevate the operative extremity with rolled towels / pillows placed under the ankle/calf as tolerated.  Move the position of towels

## 2024-02-07 NOTE — ANESTHESIA POSTPROCEDURE EVALUATION
Department of Anesthesiology  Postprocedure Note    Patient: Laney Pineda  MRN: [id number]  YOB: 1990  Date of evaluation: 2/7/2024    Procedure Summary       Date: 02/07/24 Room / Location: [address]    Anesthesia Start: 1021 Anesthesia Stop: 1321    Procedure: VIDEO ARTHROSCOPY LEFT KNEE, ANTERIOR CRUCIATE LIGAMENT RECONSTRUCTION WITH BONE TENDON BONE AUTOGRAFT, LATERAL MENISCUS REPAIR, (Left: Knee) Diagnosis:       Tear of anterior cruciate ligament, complete, left, subsequent encounter      Complex tear of medial meniscus of left knee as current injury, initial encounter      (Tear of anterior cruciate ligament, complete, left, subsequent encounter [S83.512D])      (Complex tear of medial meniscus of left knee as current injury, initial encounter [S83.232A])    Surgeons: Trav Sotelo MD Responsible Provider: Shin Feliz DO    Anesthesia Type: general, regional ASA Status: 3            Anesthesia Type: No value filed.    Olivia Phase I: Olivia Score: 9    Olivia Phase II: Olivia Score: 10    Anesthesia Post Evaluation    Patient location during evaluation: PACU  Patient participation: complete - patient participated  Level of consciousness: awake  Pain score: 2  Airway patency: patent  Nausea & Vomiting: no nausea and no vomiting  Cardiovascular status: blood pressure returned to baseline and hemodynamically stable  Respiratory status: acceptable and room air  Hydration status: stable  Comments: AWAKE, AWARE, ORIENTED  ANSWERED ALL QUESTIONS  PAIN AND VOLUME STATUS STABLE  VITAL SIGNS STABLE  Pain management: adequate    No notable events documented.

## 2024-02-08 ENCOUNTER — PATIENT MESSAGE (OUTPATIENT)
Dept: ORTHOPEDIC SURGERY | Age: 34
End: 2024-02-08

## 2024-02-08 NOTE — TELEPHONE ENCOUNTER
[address]   Cardiac f/up    Referring Provider:  Danii Jiménez MD     Chief Complaint   Patient presents with    6 Month Follow-Up    Coronary Artery Disease    Hypertension    Shortness of Breath     with walking         History of Present Illness:  Mitch Webber is 79 y.o. female who presents today with a history of  non-obst CAD 40% LAD & OM-1 lesions by cath '12, hyperlipidemia, TIA and palpitations. She continues to see Dr. Valeriano Charles for Parkinson's. She has continued to lose weight, has not taken lasix because she is worried about more weight loss. Has been following with her PCP reguarding this. She states she doesn't have much taste or smell, this started 5 years ago. She has no chest pain, sob, palpitations or dizziness. Past Medical History:   has a past medical history of CAD (coronary artery disease), Diabetes mellitus with neurological manifestation ([facility] [address] Hyperlipidemia, Hypertension, Hypothyroidism (acquired), Insomnia, Neuropathy, Parkinson disease ([facility] [address] Renal insufficiency, Restless legs syndrome, and Type I (juvenile type) diabetes mellitus without mention of complication, not stated as uncontrolled. Surgical History:   has a past surgical history that includes Dental surgery; Tonsillectomy; Cardiac catheterization (1/24/2012); and Colonoscopy. Social History:   reports that she quit smoking about 53 years ago. She smoked 0.00 packs per day for 4.00 years. She has never used smokeless tobacco. She reports that she drinks about 0.6 oz of alcohol per week. She reports that she does not use drugs. Family History:  family history includes Arthritis in her sister; Cancer in her father and another family member; Heart Disease in her brother, father, and mother. Home Medications:  Prior to Admission medications    Medication Sig Start Date End Date Taking?  Authorizing Provider   furosemide (LASIX) 20 MG tablet Take 1 tablet by mouth daily  Patient From: Laney Pineda  To: Dr. Trav Sotelo  Sent: 2/8/2024 9:31 AM EST  Subject: After care    I haven’t been able to find it in my paperwork and I can remember what you told me before surgery. But do I unlock the brace today or do I wait and at what degree do I put it at.    taking differently: Take 10 mg by mouth daily as needed  2/18/19  Yes Tl Goel MD   simvastatin (ZOCOR) 20 MG tablet TAKE 1 TABLET NIGHTLY 1/31/19  Yes Vipul Jaimes MD   levothyroxine (SYNTHROID) 100 MCG tablet Take 1 tablet by mouth daily 1/31/19  Yes Vipul Jaimes MD   pramipexole (MIRAPEX) 0.5 MG tablet Take 0.5 mg by mouth 2 times daily    Yes Historical Provider, MD   insulin aspart (NOVOLOG) 100 UNIT/ML injection vial INJECT TOTAL DAILY DOSE 25 TO 30 UNITS SUBCUTANEOUSLY WITH INSULIN PUMP. 7/3/18  Yes Blanquita Maxwell MD   Multiple Vitamins-Minerals (CENTRUM SILVER) TABS Take by mouth daily   Yes Historical Provider, MD   ibuprofen (ADVIL;MOTRIN) 400 MG tablet 1 po tid with food 12/20/17  Yes Tl Goel MD   Cholecalciferol (VITAMIN D3) 2000 UNITS CAPS Take 1 capsule by mouth daily   Yes Historical Provider, MD   glucose blood VI test strips (TIANA CONTOUR NEXT TEST) strip Dx Code E10.49 & E 10.65. Patient is testing 6-7 times a day due to retinopathy, neuropathy, hypoglycemia, hyperglycemia. 12/8/16  Yes Blanquita Maxwell MD   TIANA MICROLET LANCETS MISC 1 each by Does not apply route 8 times daily Brittle Diabetes Dx Code 250.61  Patient taking differently: 1 each by Does not apply route 8 times daily Retinopathy DX Code E10.49 & Peripheal Neuropathy DX Code E10.65 8/19/15  Yes Blanquita Maxwell MD   carbidopa-levodopa (SINEMET)  MG per tablet Take 0.5 tablets by mouth 4 times daily    Yes Historical Provider, MD   Insulin Infusion Pump (PARADIGM INSULIN PUMP) by Does not apply route. Yes Historical Provider, MD   aspirin 81 MG EC tablet Take 1 tablet by mouth daily. 4/19/10  Yes Vipul Jaimes MD        Allergies:  Patient has no known allergies. Review of Systems:   · Constitutional: there has been no unanticipated weight loss. There's been no change in energy level, sleep pattern, or activity level. · Eyes: No visual changes or diplopia. No scleral icterus.   · ENT: No Headaches, hearing loss or vertigo. No mouth sores or sore throat. · Cardiovascular: Reviewed in HPI  · Respiratory: No cough or wheezing, no sputum production. No hematemesis. · Gastrointestinal: No abdominal pain, appetite loss, blood in stools. No change in bowel or bladder habits. · Genitourinary: No dysuria, trouble voiding, or hematuria. · Musculoskeletal:  No gait disturbance, weakness or joint complaints. · Integumentary: No rash or pruritis. · Neurological: No headache, diplopia, change in muscle strength, numbness or tingling. No change in gait, balance, coordination, mood, affect, memory, mentation, behavior. · Psychiatric: No anxiety, no depression. · Endocrine: No malaise, fatigue or temperature intolerance. No excessive thirst, fluid intake, or urination. No tremor. · Hematologic/Lymphatic: No abnormal bruising or bleeding, blood clots or swollen lymph nodes. · Allergic/Immunologic: No nasal congestion or hives. Physical Examination:    Vitals:    04/24/19 1518   BP: 138/78   Pulse: 68        Constitutional and General Appearance: No acute distress  Skin:good turgor,intact without lesions  HEENT: EOMI ,normal  Neck:no JVD  Respiratory:  · Normal excursion and expansion without use of accessory muscles  · Resp Auscultation: Normal breath sounds without dullness  Cardiovascular:  · The apical impulses not displaced  · Heart tones are crisp and normal  · Cervical veins are not engorged  · The carotid upstroke is normal in amplitude and contour without delay or bruit  · Peripheral pulses are symmetrical and full  · There is no clubbing, cyanosis of the extremities.   · No edema  · Femoral Arteries: 2+ and equal  · Pedal Pulses: 2+ and equal   Abdomen:  · No masses or tenderness  · Liver/Spleen: No Abnormalities Noted  Neurological/Psychiatric:  · Alert and oriented in all spheres  · Moves all extremities well  · Exhibits normal gait balance and coordination  · No abnormalities of mood,

## 2024-02-08 NOTE — TELEPHONE ENCOUNTER
That is somewhat normal because the block is wearing off. I would leave the wrap on as that is there to help with swelling.

## 2024-02-10 ENCOUNTER — PATIENT MESSAGE (OUTPATIENT)
Dept: ORTHOPEDIC SURGERY | Age: 34
End: 2024-02-10

## 2024-02-10 DIAGNOSIS — Z47.89 ORTHOPEDIC AFTERCARE: Primary | ICD-10-CM

## 2024-02-12 ENCOUNTER — HOSPITAL ENCOUNTER (OUTPATIENT)
Dept: PHYSICAL THERAPY | Age: 34
Setting detail: THERAPIES SERIES
Discharge: HOME | End: 2024-02-12
Payer: COMMERCIAL

## 2024-02-12 PROCEDURE — 97140 MANUAL THERAPY 1/> REGIONS: CPT | Performed by: PHYSICAL THERAPIST

## 2024-02-12 PROCEDURE — 97161 PT EVAL LOW COMPLEX 20 MIN: CPT | Performed by: PHYSICAL THERAPIST

## 2024-02-12 PROCEDURE — 97112 NEUROMUSCULAR REEDUCATION: CPT | Performed by: PHYSICAL THERAPIST

## 2024-02-12 PROCEDURE — 97016 VASOPNEUMATIC DEVICE THERAPY: CPT | Performed by: PHYSICAL THERAPIST

## 2024-02-12 PROCEDURE — 97164 PT RE-EVAL EST PLAN CARE: CPT | Performed by: PHYSICAL THERAPIST

## 2024-02-12 PROCEDURE — 97110 THERAPEUTIC EXERCISES: CPT | Performed by: PHYSICAL THERAPIST

## 2024-02-12 RX ORDER — HYDROCODONE BITARTRATE AND ACETAMINOPHEN 5; 325 MG/1; MG/1
1 TABLET ORAL EVERY 6 HOURS PRN
Qty: 20 TABLET | Refills: 0 | Status: SHIPPED | OUTPATIENT
Start: 2024-02-12 | End: 2024-02-17

## 2024-02-12 NOTE — TELEPHONE ENCOUNTER
From: Laney Pineda  To: Dr. Trav Sotelo  Sent: 2/10/2024 12:25 PM EST  Subject: Pain     I was wondering about pain management. I have two days left of pain meds. I have been taking the prescription about every 8 hours and Tylenol and ibuprofen in between. The prescription helps the most at bed time. Seems like the only way I can sleep is if I take it right before bed. I know we talked previously and you only give up to 10 days of this prescription. Am I able to get another 5 days of this prescription especially with PT coming Monday.

## 2024-02-12 NOTE — PLAN OF CARE
repair.  Other:    Red Flags:  None    C-SSRS Triggered by Intake questionnaire:   [x] No, Questionnaire did not trigger screening.   [] Yes, Patient intake triggered further evaluation      [] C-SSRS Screening completed  [] PCP notified via Plan of Care  [] Emergency services notified     Preferred Language for Healthcare:   [x] English       [] other:    SUBJECTIVE EXAMINATION     Patient stated complaint:  Pt reports post operative pain currently being controlled w Tylenol/Advil.Pt has been doing ankle pumps, icing, elevating.        Pain:  Pain location: L knee med/lat   Patient describes pain to be intermittent, Sharp, dull, and aching  Pain decreases with: Resting  Pain increases with: Activity and Movement     Relevant Medical History: NA    Occupation/School:  Work/School Status: Full time  Off due to injury  Fills planes up for [employer]   Job Duties/Demands: Prolonged Standing  Heavy lifting  Awkward Positions  Repetitive or Prolonged Grasping    Sport/ Recreation/ Leisure/ Hobbies: NA    Review Of Systems (ROS):  [x] Performed Review of systems (Integumentary, CardioPulmonary, Neurological) by intake and observation. Intake form has been scanned into medical record. Patient has been instructed to contact their primary care physician regarding ROS issues if not already being addressed at this time.    [x] Patient history, allergies, meds reviewed. Medical chart reviewed. See intake form.     Co-morbidities/Complexities (which will affect course of rehabilitation):  []None        Arthritic conditions  [] Rheumatoid arthritis (M05.9)  [] Osteoarthritis (M19.91)  [] Gout        Neurological conditions  [] Prior Stroke (I69.30)  [] Parkinson's (G20)  [] Encephalopathy (G93.40)  [] Multiple Sclerosis (G35)  [] Post-polio (G14)  [] Spinal cord injury (SCI)  [] Traumatic brain injury (TBI)  [] ALS    Gastrointestinal conditions   [] Constipation (K59.00)  [] Chron's/ulcerative colitis    Endocrine conditions   []

## 2024-02-12 NOTE — TELEPHONE ENCOUNTER
I have spoken with the patient. She state dthat her pain is 7/10, with dull/achy pain as the main reasons.     Patient is taking her aspirin daily for post op protocol.

## 2024-02-14 ENCOUNTER — APPOINTMENT (OUTPATIENT)
Dept: PHYSICAL THERAPY | Age: 34
End: 2024-02-14
Payer: COMMERCIAL

## 2024-02-15 ENCOUNTER — OFFICE VISIT (OUTPATIENT)
Dept: OBGYN CLINIC | Age: 34
End: 2024-02-15
Payer: COMMERCIAL

## 2024-02-15 ENCOUNTER — TELEPHONE (OUTPATIENT)
Dept: OBGYN CLINIC | Age: 34
End: 2024-02-15

## 2024-02-15 VITALS
BODY MASS INDEX: 41.59 KG/M2 | TEMPERATURE: 98.1 F | WEIGHT: 265 LBS | DIASTOLIC BLOOD PRESSURE: 74 MMHG | SYSTOLIC BLOOD PRESSURE: 116 MMHG | HEIGHT: 67 IN

## 2024-02-15 DIAGNOSIS — N64.52 NIPPLE DISCHARGE: ICD-10-CM

## 2024-02-15 DIAGNOSIS — N64.4 BREAST PAIN: ICD-10-CM

## 2024-02-15 DIAGNOSIS — Z30.431 INTRAUTERINE DEVICE SURVEILLANCE: ICD-10-CM

## 2024-02-15 DIAGNOSIS — Z01.411 ENCNTR FOR GYN EXAM (GENERAL) (ROUTINE) W ABNORMAL FINDINGS: Primary | ICD-10-CM

## 2024-02-15 DIAGNOSIS — Z80.3 FAMILY HISTORY OF BREAST CANCER IN MALE: ICD-10-CM

## 2024-02-15 DIAGNOSIS — Z12.4 PAP SMEAR FOR CERVICAL CANCER SCREENING: ICD-10-CM

## 2024-02-15 PROCEDURE — 99395 PREV VISIT EST AGE 18-39: CPT | Performed by: OBSTETRICS & GYNECOLOGY

## 2024-02-15 PROCEDURE — G8482 FLU IMMUNIZE ORDER/ADMIN: HCPCS | Performed by: OBSTETRICS & GYNECOLOGY

## 2024-02-15 NOTE — TELEPHONE ENCOUNTER
Received call from Dr Yeni Guzman's office. Pt is scheduled for Breast imaging and consult with the Physician on March 1, 2024. This was the soonest availability dur to the imaging department. Routing as FYI

## 2024-02-15 NOTE — PROGRESS NOTES
butalbital-acetaminophen-caffeine (FIORICET, ESGIC) -40 MG per tablet Take 1 tablet by mouth every 4 hours as needed for Headaches 180 tablet 3    levonorgestrel (MIRENA) IUD 52 mg 1 each by IntraUTERine route once       No current facility-administered medications for this visit.       Surgical History:  Past Surgical History:   Procedure Laterality Date    CHOLECYSTECTOMY      KNEE SURGERY Left 2/7/2024    VIDEO ARTHROSCOPY LEFT KNEE, ANTERIOR CRUCIATE LIGAMENT RECONSTRUCTION WITH BONE TENDON BONE AUTOGRAFT, LATERAL MENISCUS REPAIR, performed by Trav Sotelo MD at [facility] OR    SLEEVE GASTRECTOMY N/A 9/15/2020    ROBOTIC ASSISTED LAPAROSCOPIC VERTICAL SLEEVE GASTRECTOMY, HIATAL HERNIA REPAIR performed by Spencer Padilla DO at [facility] OR    UPPER GASTROINTESTINAL ENDOSCOPY N/A 6/8/2020    EGD BIOPSY performed by Spencer Padilla DO at [facility] ENDOSCOPY       Allergies:  No Known Allergies    Family History:  Family History   Problem Relation Age of Onset    Breast Cancer Paternal Grandfather     Breast Cancer Paternal Grandmother     Heart Disease Maternal Grandmother     Hypertension Maternal Grandmother     Elevated Lipids Maternal Grandmother     Heart Disease Maternal Grandfather     Hypertension Maternal Grandfather     Elevated Lipids Maternal Grandfather     Depression Father     Cancer Mother         cervical cancer    Other Mother         hyperactive thyroid    Other Maternal Aunt         hyperactive thyroid       Reports personal/family history of cervical, uterine, ovarian, vulvar, breast, or colon cancers.     - Paternal grandmother - breast cancer - in her 50s     - Paternal grandfather - breast cancer - passed away young     - Mother - cervical cancer   Denies personal/family history of bleeding or clotting disorders  Denies personal/family history of genetic disorders    Social History:  Social History     Socioeconomic History    Marital status: Single     Spouse name: None    Number of children:

## 2024-02-16 LAB
B-HCG SERPL EIA 3RD IS-ACNC: <5 MIU/ML
HPV16+18+H RISK 12 DNA SPEC-IMP: NORMAL
PROLACTIN SERPL IA-MCNC: 25.1 NG/ML
TSH SERPL DL<=0.005 MIU/L-ACNC: 1.73 UIU/ML (ref 0.27–4.2)

## 2024-02-19 ENCOUNTER — HOSPITAL ENCOUNTER (OUTPATIENT)
Dept: PHYSICAL THERAPY | Age: 34
Setting detail: THERAPIES SERIES
End: 2024-02-19
Payer: COMMERCIAL

## 2024-02-20 ENCOUNTER — HOSPITAL ENCOUNTER (OUTPATIENT)
Dept: PHYSICAL THERAPY | Age: 34
Setting detail: THERAPIES SERIES
Discharge: HOME | End: 2024-02-20
Payer: COMMERCIAL

## 2024-02-20 ENCOUNTER — OFFICE VISIT (OUTPATIENT)
Dept: ORTHOPEDIC SURGERY | Age: 34
End: 2024-02-20

## 2024-02-20 VITALS — HEIGHT: 67 IN | BODY MASS INDEX: 41.59 KG/M2 | WEIGHT: 265 LBS

## 2024-02-20 DIAGNOSIS — Z47.89 ORTHOPEDIC AFTERCARE: Primary | ICD-10-CM

## 2024-02-20 LAB
HPV HR 12 DNA SPEC QL NAA+PROBE: NOT DETECTED
HPV16 DNA SPEC QL NAA+PROBE: NOT DETECTED
HPV16+18+H RISK 12 DNA SPEC-IMP: NORMAL
HPV18 DNA SPEC QL NAA+PROBE: NOT DETECTED

## 2024-02-20 PROCEDURE — 97110 THERAPEUTIC EXERCISES: CPT | Performed by: PHYSICAL THERAPIST

## 2024-02-20 PROCEDURE — 99024 POSTOP FOLLOW-UP VISIT: CPT | Performed by: ORTHOPAEDIC SURGERY

## 2024-02-20 PROCEDURE — 1073RET COMPLETION OF WORKABILITY PRESCRIPTION: Performed by: ORTHOPAEDIC SURGERY

## 2024-02-20 PROCEDURE — 97112 NEUROMUSCULAR REEDUCATION: CPT | Performed by: PHYSICAL THERAPIST

## 2024-02-20 PROCEDURE — 1040RET RETAIN PT ID & RECRUITMENT: Performed by: ORTHOPAEDIC SURGERY

## 2024-02-20 PROCEDURE — 97140 MANUAL THERAPY 1/> REGIONS: CPT | Performed by: PHYSICAL THERAPIST

## 2024-02-20 NOTE — FLOWSHEET NOTE
presenting today to Outpatient PT with signs and symptoms consistent with L ACL and  .    Pt. presents with the functional impairments and activity limitations listed below and would benefit from Outpatient PT to address the below impairments as well as improve pain, and restore function.     Functional Impairments:   Decreased LE functional ROM  Decreased LE functional strength   Reduced balance/proprioceptive control      Today's Assessment: See above    Medical Necessity Documentation:  I certify that this patient meets the below criteria necessary for medical necessity for care and/or justification of therapy services:  The patient has functional impairments and/or activity limitations and would benefit from continued outpatient therapy services to address the deficits outlined in the patients goals    Therapy Session Tolerance:  [x] Patient tolerated treatment well [] Patient limited by fatique  [] Patient limited by pain  [] Patient limited by other medical complications  [] Other:     Return to Play: NA    Prognosis for POC: [x] Good [] Fair  [] Poor    Patient requires continued skilled intervention: [x] Yes  [] No      CHARGE CAPTURE     PT CHARGE GRID   CPT Code (TIMED) minutes # CPT Code (UNTIMED) #     Therex (62719)  10 1  EVAL:LOW (37209 - Typically 20 minutes face-to-face)     Neuromusc. Re-ed (03009) 15 1  Re-Eval (03615)     Manual (46599) 16 1  Estim Unattended (13127)     Ther. Act (64097)    Mech. Traction (73203)     Gait (58193)    Dry Needle 1-2 muscle (20560)     Aquatic Therex (64073)    Dry Needle 3+ muscle (20561)     Iontophoresis (43714)    VASO (35549)     Ultrasound (08302)    Group Therapy (99122)     Estim Attended (47087)    Canalith Repositioning (85881)     Other:    Other:    Total Timed Code Tx Minutes 41        Total Treatment Minutes 41        Charge Justification:  (44218) THERAPEUTIC EXERCISE - Provided verbal/tactile cueing for activities related to strengthening,

## 2024-02-20 NOTE — PROGRESS NOTES
POST OPERATIVE ORTHOPAEDIC NOTE    DOS: 2/7/2024  PROCEDURES: Left knee ACL reconstruction with BTB autograft and lateral meniscus repair    The patient has been recovering. The patient states the pain is 4/10 pain.  The patient has started PT. she is gone to 1 visit.  She has been partial weightbearing using crutches    Focused pertinent physical examination of the operative extremity:  Wounds C/D/I, well healing surgical incisions  No erythema or drainage  No pitting edema.  Nontender to palpation calf  Skin intact throughout  5/5 IP Q H TA G EHL  SILT DP SP LP MP S S  +2 DP pulse    Radiographs: 2 view left knee 2/20/2024: Negative fracture.  Orthopedic hardware implants in place without interval change     Diagnosis Orders   1. Orthopedic aftercare  XR KNEE LEFT (1-2 VIEWS)        Assessment and plan: The patient is now 13 days status post the above listed procedure and is recovering    .    --Greater than 50% of the time (11/20 minutes) was spent coordinating care and discussing postoperative recovery course  -I had a pleasant discussion with the patient today.  I reviewed with her intraoperative procedures performed as well as arthroscopic photos  -She is 25% partial weightbearing left lower extremity given lateral meniscus repair.  She was once again instructed to work on brace and crutch use  -Formal physical therapy will follow BTB ACL reconstruction protocol with meniscus repair  -She will continue aspirin 325 mg daily x 3 weeks in total postoperatively for DVT/VTE prophylaxis  -Patient was educated as to scar tissue massage  -Satisfactory p.o. pain medication by Tylenol  -All questions answered to the patient's satisfaction and the patient expressed understanding and agreement with the above listed treatment plan  -Follow up in 4 weeks time for routine 6-week postop visit  -Thank you for the clinical consultation and allowing me to participate in the patient's care.      Electronically signed by Trav MCINTYRE

## 2024-02-22 ENCOUNTER — HOSPITAL ENCOUNTER (OUTPATIENT)
Dept: PHYSICAL THERAPY | Age: 34
Setting detail: THERAPIES SERIES
Discharge: HOME | End: 2024-02-22
Payer: COMMERCIAL

## 2024-02-22 DIAGNOSIS — M94.262 CHONDROMALACIA OF LEFT KNEE: ICD-10-CM

## 2024-02-22 DIAGNOSIS — S83.272A COMPLEX TEAR OF LATERAL MENISCUS OF LEFT KNEE AS CURRENT INJURY, INITIAL ENCOUNTER: ICD-10-CM

## 2024-02-22 PROCEDURE — 97112 NEUROMUSCULAR REEDUCATION: CPT

## 2024-02-22 PROCEDURE — 97140 MANUAL THERAPY 1/> REGIONS: CPT

## 2024-02-22 PROCEDURE — 97110 THERAPEUTIC EXERCISES: CPT

## 2024-02-22 RX ORDER — MELOXICAM 15 MG/1
15 TABLET ORAL DAILY PRN
Qty: 30 TABLET | Refills: 0 | Status: SHIPPED | OUTPATIENT
Start: 2024-02-22

## 2024-02-22 NOTE — FLOWSHEET NOTE
Adjusted  Improve knee AROM to 120-130 degrees or better to allow for proper joint functioning as indicated by patients functional deficits.  Status: [] Progressing: [] Met: [] Not Met: [] Adjusted  Pt to improve strength to 4+/5 or better of proximal hip, quadriceps, and hamstrings to allow for proper muscle and joint use in functional mobility, ADLs and prior level of function   Status: [] Progressing: [] Met: [] Not Met: [] Adjusted  Patient will return to  walk 1 mile  without increased symptoms or restriction to work towards return to prior level of function.        Status: [] Progressing: [] Met: [] Not Met: [] Adjusted  Patient will be able to manage symptoms while resuming full duty at work.             Status: [] Progressing: [] Met: [] Not Met: [] Adjusted    TREATMENT PLAN     Frequency/Duration: 2x/week for  12  weeks  for the following treatment interventions:    Interventions:  [x] Therapeutic exercise including: strength training, ROM, including postural re-education.   [x] NMR activation and proprioception, including postural re-education.    [x] Manual therapy as indicated to include: PROM, Gr I-IV mobilizations, and STM  [x] Modalities as needed that may include: Cryotherapy, Electrical Stimulation, and Vasoneumatic Compression  [x] Patient education on joint protection, postural re-education, activity modification, progression of HEP.        [] Aquatic Therapy    Plan: Cont POC- Continue emphasis/focus on increasing ROM and improving quad contraction. Next visit plan to continue current phase and cont NMES as well as add to HEP     Electronically Signed by Rafa Proctor, PT  [id number]                                                                                 Date: 02/22/2024

## 2024-02-26 ENCOUNTER — HOSPITAL ENCOUNTER (OUTPATIENT)
Dept: PHYSICAL THERAPY | Age: 34
Setting detail: THERAPIES SERIES
Discharge: HOME | End: 2024-02-26
Payer: COMMERCIAL

## 2024-02-26 PROCEDURE — 97112 NEUROMUSCULAR REEDUCATION: CPT

## 2024-02-26 PROCEDURE — 97140 MANUAL THERAPY 1/> REGIONS: CPT

## 2024-02-26 PROCEDURE — 97110 THERAPEUTIC EXERCISES: CPT

## 2024-02-26 NOTE — FLOWSHEET NOTE
[facility]- Outpatient Rehabilitation and Therapy  [address], [place] [address] office: [phone] fax: [phone]         Physical Therapy: TREATMENT/PROGRESS NOTE   Patient: Laney Pineda (33 y.o. female)   Examination Date: 2024   :  1990 MRN: [id number]   Visit #: 4   Insurance Allowable Auth Needed   60 (4 previously used) []Yes    [x]No    Insurance: Payor: BCBS / Plan: BCBS - [place] PPO / Product Type: *No Product type* /   Insurance ID: [id number] - ([place] BCBS)  Secondary Insurance (if applicable):    Treatment Diagnosis:     ICD-10-CM    1. Acute pain of left knee  M25.562       2. Knee stiffness, left  M25.662          Medical Diagnosis:  Rupture of anterior cruciate ligament of left knee, initial encounter [S83.512A]   Referring Physician: Trav Sotelo MD  PCP: Ashley Patel PA       Plan of care signed (Y/N): YES    Date of Patient follow up with Physician: 3/19/24    Progress Report/POC: NO  POC update due: (10 visits /OR AUTH LIMITS, whichever is less)  3/12/2024                                             Precautions/ Contra-indications:           Latex allergy:  NO  Pacemaker:    NO  Contraindications for Manipulation: no ACL  Date of Surgery: 24 BPTB graft, lateral meniscus repair.  Other:      SUBJECTIVE EXAMINATION     Patient stated complaint:  Pt reports that MD visit on  went well with Deepak. She is still 25%PWB with brace at 0-60. She notes that MD wanted her to focus on ext.       Pain: 2-3/10 past 24 hours       OBJECTIVE EXAMINATION     Observation/palpation:     edema round knee and PFJ  Minimally hypomobile medial lateral patellar mobs  Moderate quad contraction pre session  Incision with clean steri strips and no drainage, no signs of infection    Test measurements:      Test used Initial score  2024  Re eval 24   Pain Summary VAS 2-8/10 8/10 4/10 2-3/10   Functional questionnaire

## 2024-02-28 ENCOUNTER — PATIENT MESSAGE (OUTPATIENT)
Dept: FAMILY MEDICINE CLINIC | Age: 34
End: 2024-02-28

## 2024-02-28 RX ORDER — HYDROXYZINE HYDROCHLORIDE 25 MG/1
25 TABLET, FILM COATED ORAL NIGHTLY
Qty: 30 TABLET | Refills: 3 | Status: SHIPPED | OUTPATIENT
Start: 2024-02-28 | End: 2024-06-27

## 2024-02-28 NOTE — TELEPHONE ENCOUNTER
From: Laney Pineda  To: Ashley Patel  Sent: 2/28/2024 9:56 AM EST  Subject: Refill medication    I was hoping you would be able to refill a medication I was previously taking as a sleep aid. I recently have had a terrible time with sleep and started taking it again and it helped a lot but now I am out of the medication. It’s also not on my medication list anymore. But it was atarax.

## 2024-02-29 ENCOUNTER — HOSPITAL ENCOUNTER (OUTPATIENT)
Dept: PHYSICAL THERAPY | Age: 34
Setting detail: THERAPIES SERIES
Discharge: HOME | End: 2024-02-29
Payer: COMMERCIAL

## 2024-02-29 PROCEDURE — 97140 MANUAL THERAPY 1/> REGIONS: CPT

## 2024-02-29 PROCEDURE — 97112 NEUROMUSCULAR REEDUCATION: CPT

## 2024-02-29 PROCEDURE — 97110 THERAPEUTIC EXERCISES: CPT

## 2024-02-29 NOTE — FLOWSHEET NOTE
NA    Prognosis for POC: [x] Good [] Fair  [] Poor    Patient requires continued skilled intervention: [x] Yes  [] No      CHARGE CAPTURE     PT CHARGE GRID   CPT Code (TIMED) minutes # CPT Code (UNTIMED) #     Therex (42225)  14 1  EVAL:LOW (15448 - Typically 20 minutes face-to-face)     Neuromusc. Re-ed (93171) 16 1  Re-Eval (44926)     Manual (47068) 8 1  Estim Unattended (40420)     Ther. Act (90068)    Mech. Traction (55199)     Gait (68527)    Dry Needle 1-2 muscle (34473)     Aquatic Therex (05422)    Dry Needle 3+ muscle (20561)     Iontophoresis (38338)    VASO (94222)     Ultrasound (20501)    Group Therapy (95746)     Estim Attended (86560)    Canalith Repositioning (97659)     Other:    Other: x10 cryotherapy    Total Timed Code Tx Minutes 38        Total Treatment Minutes 48        Charge Justification:  (27994) THERAPEUTIC EXERCISE - Provided verbal/tactile cueing for activities related to strengthening, flexibility, endurance, ROM performed to prevent loss of range of motion, maintain or improve muscular strength or increase flexibility, following either an injury or surgery.   (55057) NEUROMUSCULAR RE-EDUCATION - Therapeutic procedure, 1 or more areas, each 15 minutes; neuromuscular reeducation of movement, balance, coordination, kinesthetic sense, posture, and/or proprioception for sitting and/or standing activities  (35025) MANUAL THERAPY -  Manual therapy techniques, 1 or more regions, each 15 minutes (Mobilization/manipulation, manual lymphatic drainage, manual traction) for the purpose of modulating pain, promoting relaxation,  increasing ROM, reducing/eliminating soft tissue swelling/inflammation/restriction, improving soft tissue extensibility and allowing for proper ROM for normal function with self care, mobility, lifting and ambulation      GOALS     Patient stated goal: to get back to work w/o restrictions   Status:  [] Progressing: [] Met: [] Not Met: [] Adjusted    Therapist goals for

## 2024-03-01 ENCOUNTER — HOSPITAL ENCOUNTER (OUTPATIENT)
Dept: WOMENS IMAGING | Age: 34
Discharge: HOME | End: 2024-03-01
Payer: COMMERCIAL

## 2024-03-01 VITALS — WEIGHT: 260 LBS | BODY MASS INDEX: 40.81 KG/M2 | HEIGHT: 67 IN

## 2024-03-01 DIAGNOSIS — N64.4 BREAST PAIN: ICD-10-CM

## 2024-03-01 DIAGNOSIS — N64.52 NIPPLE DISCHARGE: ICD-10-CM

## 2024-03-01 PROCEDURE — 76641 ULTRASOUND BREAST COMPLETE: CPT

## 2024-03-01 PROCEDURE — G0279 TOMOSYNTHESIS, MAMMO: HCPCS

## 2024-03-01 PROCEDURE — 76642 ULTRASOUND BREAST LIMITED: CPT

## 2024-03-04 ENCOUNTER — HOSPITAL ENCOUNTER (OUTPATIENT)
Dept: PHYSICAL THERAPY | Age: 34
Setting detail: THERAPIES SERIES
Discharge: HOME | End: 2024-03-04
Payer: COMMERCIAL

## 2024-03-04 PROCEDURE — 97140 MANUAL THERAPY 1/> REGIONS: CPT | Performed by: PHYSICAL THERAPIST

## 2024-03-04 PROCEDURE — 97110 THERAPEUTIC EXERCISES: CPT | Performed by: PHYSICAL THERAPIST

## 2024-03-04 NOTE — FLOWSHEET NOTE
[facility]- Outpatient Rehabilitation and Therapy  [address], [place] [address] office: [phone] fax: [phone]         Physical Therapy: TREATMENT/PROGRESS NOTE   Patient: Laney Pineda (33 y.o. female)   Examination Date: 2024   :  1990 MRN: [id number]   Visit #: 6   Insurance Allowable Auth Needed   60 (4 previously used) []Yes    [x]No    Insurance: Payor: BCBS / Plan: BCBS - [place] PPO / Product Type: *No Product type* /   Insurance ID: [id number] - ([place] BCBS)  Secondary Insurance (if applicable):    Treatment Diagnosis:     ICD-10-CM    1. Acute pain of left knee  M25.562       2. Knee stiffness, left  M25.662          Medical Diagnosis:  Rupture of anterior cruciate ligament of left knee, initial encounter [S83.512A]   Referring Physician: Trav Sotelo MD  PCP: Ashley Patel PA       Plan of care signed (Y/N): YES    Date of Patient follow up with Physician: 3/19/24    Progress Report/POC: NO  POC update due: (10 visits /OR AUTH LIMITS, whichever is less)  3/12/2024                                             Precautions/ Contra-indications:           Latex allergy:  NO  Pacemaker:    NO  Contraindications for Manipulation: no ACL  Date of Surgery: 24 BPTB graft, lateral meniscus repair.  Other:      SUBJECTIVE EXAMINATION     Patient stated complaint:  Pt reports she is not having too much pain. She is not using her crutches at home because she feels as if she can walk well enough. Pt was really sore in her muscles after her last visit.     Pain: 0-4/10 at night      OBJECTIVE EXAMINATION     Observation/palpation:   Decreased -palpable edema L knee      Test measurements:      Test used Initial score  2024  Re eval 2/20/24 3/4/24   Pain Summary VAS 2-8/10 8/10 4/10 0/10   Functional questionnaire LEFS () 70% (12) 85%     PROM KE -5 0 0 0    KF 85              40             72              90   MMT HF 3+/5       HAB

## 2024-03-07 ENCOUNTER — PATIENT MESSAGE (OUTPATIENT)
Dept: BARIATRICS/WEIGHT MGMT | Age: 34
End: 2024-03-07

## 2024-03-07 ENCOUNTER — HOSPITAL ENCOUNTER (OUTPATIENT)
Dept: PHYSICAL THERAPY | Age: 34
Setting detail: THERAPIES SERIES
Discharge: HOME | End: 2024-03-07
Payer: COMMERCIAL

## 2024-03-07 PROCEDURE — 97110 THERAPEUTIC EXERCISES: CPT | Performed by: PHYSICAL THERAPIST

## 2024-03-07 PROCEDURE — 97112 NEUROMUSCULAR REEDUCATION: CPT | Performed by: PHYSICAL THERAPIST

## 2024-03-07 NOTE — FLOWSHEET NOTE
[facility]- Outpatient Rehabilitation and Therapy  [address], [place] [address] office: [phone] fax: [phone]         Physical Therapy: TREATMENT/PROGRESS NOTE   Patient: Laney Pineda (33 y.o. female)   Examination Date: 2024   :  1990 MRN: [id number]   Visit #: 7   Insurance Allowable Auth Needed   60 (4 previously used) []Yes    [x]No    Insurance: Payor: BCBS / Plan: BCBS [place] PPO / Product Type: *No Product type* /   Insurance ID: [id number] - ([place] BCBS)  Secondary Insurance (if applicable):    Treatment Diagnosis:     ICD-10-CM    1. Acute pain of left knee  M25.562       2. Knee stiffness, left  M25.662          Medical Diagnosis:  Rupture of anterior cruciate ligament of left knee, initial encounter [S83.512A]   Referring Physician: Trav Sotelo MD  PCP: Ashley Patel PA       Plan of care signed (Y/N): YES    Date of Patient follow up with Physician: 3/19/24    Progress Report/POC: NO  POC update due: (10 visits /OR AUTH LIMITS, whichever is less)  3/12/2024                                             Precautions/ Contra-indications:           Latex allergy:  NO  Pacemaker:    NO  Contraindications for Manipulation: no ACL  Date of Surgery: 24 BPTB graft, lateral meniscus repair.  Other:      SUBJECTIVE EXAMINATION     Patient stated complaint:  Pt reports she is not having too much pain. She is now using her one crutch at home .     Pain: 0-4/10 at night      OBJECTIVE EXAMINATION     Observation/palpation:   Pt arrived to PT w one axillary crutch on L side (Instruction was to use 2 at home but one is better than no AD, not out of house)      Test measurements:      Test used Initial score  2024  Re eval 2/20/24 3/4/24   Pain Summary VAS 2-8/10 8/10 4/10 0/10   Functional questionnaire LEFS (24) 70% (12) 85%     PROM KE -5 0 0 0    KF 85              40             72              90   MMT HF 3+/5       HAB

## 2024-03-07 NOTE — TELEPHONE ENCOUNTER
Attempt to contact for Bariatric Follow Up. [employer] message sent and letter mailed to address on file in [facility]

## 2024-03-11 ENCOUNTER — APPOINTMENT (OUTPATIENT)
Dept: PHYSICAL THERAPY | Age: 34
End: 2024-03-11
Payer: COMMERCIAL

## 2024-03-14 ENCOUNTER — HOSPITAL ENCOUNTER (OUTPATIENT)
Dept: PHYSICAL THERAPY | Age: 34
Setting detail: THERAPIES SERIES
Discharge: HOME | End: 2024-03-14
Payer: COMMERCIAL

## 2024-03-14 PROCEDURE — 97112 NEUROMUSCULAR REEDUCATION: CPT

## 2024-03-14 PROCEDURE — 97016 VASOPNEUMATIC DEVICE THERAPY: CPT

## 2024-03-14 PROCEDURE — 97110 THERAPEUTIC EXERCISES: CPT

## 2024-03-14 NOTE — FLOWSHEET NOTE
PROM KE -5 0 0 0 0    KF 85              40             72              90 90   MMT HF 3+/5        HAB 3+/5        KF 4-/5        KE NT        quad fair            poor   SLR no lag, good                               Exercises/Interventions     Per Dr Sotelo ACL reconstruction w meniscal repair  Lateral menisical repair/ACL BPTB graft 2/7/24; MD f/u 3/19/24    Phase I: Immediately postoperative (weeks 0- 6) 2/7-3/20/2024  Goals:   Protect graft and graft fixation   Minimize effects of immobilization   Control inflammation/swelling   ROM: 0-90 when supine (such as heel slides).   o Caution: avoid squatting and flexion for leg press beyond 90 degrees until 4 months post-op.   Brace 0-90 degrees for ADLs until 6 weeks post-op   Educate patient on rehabilitation progression   Weight bearing Status: 50% weightbearing until 6 weeks post-op, then advance to full weight bearing and wean off crutches and out of brace per protocol.   Exercises:   Patellar mobilization/scar mobilization   Delay hamstring strengthening for 12 weeks. (for hamstring tendon autograft procedure only)   Hamstring curls - add weight as tolerated (for patellar tendon autograft procedure only)   Heel slides (to 90° only for hamstring tendon autograft procedure)   Quad sets (consider NMES for poor quad sets)   Gastroc/Soleus stretching   Hamstring stretches (very gentle for hamstring tendon autograft procedures)   Gastroc/Soleus strengthening (for patellar tendon autograft procedures)   SLR, all planes, with brace in full extension until quadriceps strength is sufficient to prevent extension lag - add weight as tolerated to hip abduction, adduction and extension.   For patellar tendon autograft procedures only:   Closed Kinetic Chain Quadriceps strengthening activities as tolerated (wall sit, step ups, mini squats, leg press 90-30 degrees)   Quadriceps isometrics at 60° and 90°   Balance/Proprioception           Therapeutic Ex (41783)  resistance

## 2024-03-18 ENCOUNTER — APPOINTMENT (OUTPATIENT)
Dept: PHYSICAL THERAPY | Age: 34
End: 2024-03-18
Payer: COMMERCIAL

## 2024-03-19 ENCOUNTER — OFFICE VISIT (OUTPATIENT)
Dept: ORTHOPEDIC SURGERY | Age: 34
End: 2024-03-19

## 2024-03-19 ENCOUNTER — PATIENT MESSAGE (OUTPATIENT)
Dept: ORTHOPEDIC SURGERY | Age: 34
End: 2024-03-19

## 2024-03-19 VITALS — BODY MASS INDEX: 40.81 KG/M2 | HEIGHT: 67 IN | WEIGHT: 260 LBS

## 2024-03-19 DIAGNOSIS — Z47.89 ORTHOPEDIC AFTERCARE: Primary | ICD-10-CM

## 2024-03-19 PROCEDURE — 99024 POSTOP FOLLOW-UP VISIT: CPT | Performed by: ORTHOPAEDIC SURGERY

## 2024-03-19 PROCEDURE — 1073RET COMPLETION OF WORKABILITY PRESCRIPTION: Performed by: ORTHOPAEDIC SURGERY

## 2024-03-19 NOTE — PROGRESS NOTES
POST OPERATIVE ORTHOPAEDIC NOTE    DOS: 2/7/2024  PROCEDURES: Left knee ACL reconstruction with BTB autograft and lateral meniscus repair     The patient has been recovering. The patient states the pain is 3-4/10 pain.  The patient has continued PT. patient states that she is been going to physical therapy.  Reading physical therapy's note they state that she has been doing too much with the knee and has been not utilizing crutches much or at all and has effectively been weightbearing.  She has been taking ibuprofen as needed.  She feels that she has been working on range of motion.  She states that she was getting out of the car and felt that she had slightly twisted her knee however she has been in the brace.  Denies gross instability.    Focused pertinent physical examination of the operative extremity:  Wounds C/D/I, well healed which the plate will surgical incisions  0/110/0 degrees active range of motion  Negative anterior drawer negative posterior drawer, 1A Lachman  Stable to varus and valgus at 0 and 30 degrees  Skin intact throughout  5/5 IP Q H TA G EHL  SILT DP SP LP MP S S  +2 DP pulse     Diagnosis Orders   1. Orthopedic aftercare          Assessment and plan: The patient is now 6 weeks status post the above listed procedure and is recovering    .    --Greater than 50% of the time (11/20 minutes) was spent coordinating care and discussing postoperative recovery course  -I had a pleasant discussion with the patient today.  I reviewed with her the importance of following postoperative protocol and is spoken with her therapist Mirna directly.  The patient has been noncompliant and has been doing too much and has been weightbearing more than anticipated as she was to be 25 to 50% partial weightbearing for the first 6 weeks.  She has been utilizing no crutch to maybe 1 crutch.  I reviewed with her at this time she is able to be weightbearing as tolerated with her knee brace unlocked for full range of

## 2024-03-20 ENCOUNTER — PATIENT MESSAGE (OUTPATIENT)
Dept: ORTHOPEDIC SURGERY | Age: 34
End: 2024-03-20

## 2024-03-21 ENCOUNTER — HOSPITAL ENCOUNTER (OUTPATIENT)
Dept: PHYSICAL THERAPY | Age: 34
Setting detail: THERAPIES SERIES
Discharge: HOME | End: 2024-03-21
Payer: COMMERCIAL

## 2024-03-21 PROCEDURE — 97110 THERAPEUTIC EXERCISES: CPT

## 2024-03-21 PROCEDURE — 97140 MANUAL THERAPY 1/> REGIONS: CPT

## 2024-03-21 PROCEDURE — 97112 NEUROMUSCULAR REEDUCATION: CPT

## 2024-03-21 PROCEDURE — 97016 VASOPNEUMATIC DEVICE THERAPY: CPT

## 2024-03-21 NOTE — FLOWSHEET NOTE
Supine Heel Slide  - 1 x daily - 10 reps - 10 hold  - Seated Knee Flexion Slide  - 1 x daily - 10 reps - 10 hold  - Sidelying Hip Abduction  - 1 x daily - 3 sets - 10 reps - 3 hold    ASSESSMENT   Assessment:   Laney Pineda is a 33 y.o. female presenting today to Outpatient PT with signs and symptoms consistent with L ACL and meniscal repair .        Today's Assessment:  Prasanth was able to initiate bike today and progress into further quad strengthening to correct the lack of knee flexion with swing phase in gait. She was able to demo symmetrical gait with single crutch after completing bike, standing marches, and mini squats. She did have quick fatigue with ecc quad activities this session. Vaso given at end of sessio for local inflammation. Edu given to abide within use of assistive devices until slowly weaning off over course of next couple weeks. MD hidalgo/karen 5/2/24.         Medical Necessity Documentation:  I certify that this patient meets the below criteria necessary for medical necessity for care and/or justification of therapy services:  The patient has functional impairments and/or activity limitations and would benefit from continued outpatient therapy services to address the deficits outlined in the patients goals    Therapy Session Tolerance:  [x] Patient tolerated treatment well [] Patient limited by fatique  [] Patient limited by pain  [] Patient limited by other medical complications  [] Other:     Return to Play: NA    Prognosis for POC: [x] Good [] Fair  [] Poor    Patient requires continued skilled intervention: [x] Yes  [] No      CHARGE CAPTURE     PT CHARGE GRID   CPT Code (TIMED) minutes # CPT Code (UNTIMED) #     Therex (64435)  20 1  EVAL:LOW (42100 - Typically 20 minutes face-to-face)     Neuromusc. Re-ed (08528) 10 1  Re-Eval (06244)     Manual (88457) 8 1  Estim Unattended (72215)     Ther. Act (40655)    [facility]. Traction (78455)     Gait (77932)    Dry Needle 1-2 muscle (16731)     Aquatic

## 2024-03-25 ENCOUNTER — APPOINTMENT (OUTPATIENT)
Dept: PHYSICAL THERAPY | Age: 34
End: 2024-03-25
Payer: COMMERCIAL

## 2024-03-25 ENCOUNTER — PATIENT MESSAGE (OUTPATIENT)
Dept: ORTHOPEDIC SURGERY | Age: 34
End: 2024-03-25

## 2024-03-25 DIAGNOSIS — Z47.89 ORTHOPEDIC AFTERCARE: Primary | ICD-10-CM

## 2024-03-25 RX ORDER — MELOXICAM 15 MG/1
15 TABLET ORAL DAILY PRN
Qty: 30 TABLET | Refills: 0 | Status: SHIPPED | OUTPATIENT
Start: 2024-03-25

## 2024-03-25 NOTE — TELEPHONE ENCOUNTER
From: Laney Pineda  To: Dr. Trav Sotelo  Sent: 3/25/2024 11:31 AM EDT  Subject: New meds    My last appointment I was told to  a new anti inflammatory but I went to the pharmacy and they didn’t have any requests for anything

## 2024-03-26 ENCOUNTER — HOSPITAL ENCOUNTER (OUTPATIENT)
Dept: PHYSICAL THERAPY | Age: 34
Setting detail: THERAPIES SERIES
Discharge: HOME | End: 2024-03-26
Payer: COMMERCIAL

## 2024-03-26 DIAGNOSIS — R09.81 NASAL CONGESTION: ICD-10-CM

## 2024-03-26 PROCEDURE — 97112 NEUROMUSCULAR REEDUCATION: CPT | Performed by: PHYSICAL THERAPIST

## 2024-03-26 PROCEDURE — 97016 VASOPNEUMATIC DEVICE THERAPY: CPT | Performed by: PHYSICAL THERAPIST

## 2024-03-26 PROCEDURE — 97110 THERAPEUTIC EXERCISES: CPT | Performed by: PHYSICAL THERAPIST

## 2024-03-26 RX ORDER — FLUTICASONE PROPIONATE 50 MCG
2 SPRAY, SUSPENSION (ML) NASAL DAILY
Refills: 1 | OUTPATIENT
Start: 2024-03-26

## 2024-03-26 NOTE — FLOWSHEET NOTE
muscle (93396)     Aquatic Therex (33693)    Dry Needle 3+ muscle (73678)     Iontophoresis (20024)    VASO (97043) [id number]    Ultrasound (96042)    Group Therapy (11078)     Estim Attended (60274)    Canalith Repositioning (31809)     Other:    Other:     Total Timed Code Tx Minutes 36 3  1     Total Treatment Minutes 54        Charge Justification:  (61399) THERAPEUTIC EXERCISE - Provided verbal/tactile cueing for activities related to strengthening, flexibility, endurance, ROM performed to prevent loss of range of motion, maintain or improve muscular strength or increase flexibility, following either an injury or surgery.   (12529) NEUROMUSCULAR RE-EDUCATION - Therapeutic procedure, 1 or more areas, each 15 minutes; neuromuscular reeducation of movement, balance, coordination, kinesthetic sense, posture, and/or proprioception for sitting and/or standing activities  (05145) VASOPNEUMATIC      GOALS     Patient stated goal: to get back to work w/o restrictions   Status:  [x] Progressing: [] Met: [] Not Met: [] Adjusted    Therapist goals for Patient:   Short Term Goals: To be achieved in: 2 weeks 2/2 MET  Independent in HEP and progression per patient tolerance, in order to progress toward full function and prevent re-injury.    Status: [] Progressing: [x] Met: [] Not Met: [] Adjusted  Patient will have a decrease in pain to 2/10 to help facilitate improvement in movement, function, and ADLs as indicated by functional deficits.   Status: [] Progressing: [x] Met: [] Not Met: [] Adjusted    Long Term Goals: To be achieved in: 12 weeks  Disability index score of 35% or less for the LEFS to assist with return top prior level of function.   Status: [] Progressing: [] Met: [] Not Met: [] Adjusted  Improve knee AROM to 120-130 degrees or better to allow for proper joint functioning as indicated by patients functional deficits.  Status: [] Progressing: [x] Met: [] Not Met: [] Adjusted  Pt to improve strength to 4+/5 or

## 2024-03-28 ENCOUNTER — HOSPITAL ENCOUNTER (OUTPATIENT)
Dept: PHYSICAL THERAPY | Age: 34
Setting detail: THERAPIES SERIES
Discharge: HOME | End: 2024-03-28
Payer: COMMERCIAL

## 2024-03-28 PROCEDURE — 97110 THERAPEUTIC EXERCISES: CPT

## 2024-03-28 PROCEDURE — 97112 NEUROMUSCULAR REEDUCATION: CPT

## 2024-03-28 PROCEDURE — 97016 VASOPNEUMATIC DEVICE THERAPY: CPT

## 2024-03-28 NOTE — FLOWSHEET NOTE
Status: [] Progressing: [x] Met: [] Not Met: [] Adjusted  Patient will have a decrease in pain to 2/10 to help facilitate improvement in movement, function, and ADLs as indicated by functional deficits.   Status: [] Progressing: [x] Met: [] Not Met: [] Adjusted    Long Term Goals: To be achieved in: 12 weeks  Disability index score of 35% or less for the LEFS to assist with return top prior level of function.   Status: [x] Progressing: [] Met: [] Not Met: [] Adjusted  Improve knee AROM to 120-130 degrees or better to allow for proper joint functioning as indicated by patients functional deficits.  Status: [] Progressing: [x] Met: [] Not Met: [] Adjusted  Pt to improve strength to 4+/5 or better of proximal hip, quadriceps, and hamstrings to allow for proper muscle and joint use in functional mobility, ADLs and prior level of function   Status: [x] Progressing: [] Met: [] Not Met: [] Adjusted  Patient will return to  walk 1 mile  without increased symptoms or restriction to work towards return to prior level of function.        Status: [x] Progressing: [] Met: [] Not Met: [] Adjusted  Patient will be able to manage symptoms while resuming full duty at work.             Status: [x] Progressing: [] Met: [] Not Met: [] Adjusted    TREATMENT PLAN     Frequency/Duration: 2x/week for  10  weeks  for the following treatment interventions:    Interventions:  [x] Therapeutic exercise including: strength training, ROM, including postural re-education.   [x] NMR activation and proprioception, including postural re-education.    [x] Manual therapy as indicated to include: PROM, Gr I-IV mobilizations, and STM  [x] Modalities as needed that may include: Cryotherapy, Electrical Stimulation, and Vasoneumatic Compression  [x] Patient education on joint protection, postural re-education, activity modification, progression of HEP.        [] Aquatic Therapy    Plan: Cont POC- Continue emphasis/focus on increasing ROM and improving

## 2024-04-18 DIAGNOSIS — K44.9 HIATAL HERNIA WITH GERD: ICD-10-CM

## 2024-04-18 DIAGNOSIS — K21.9 HIATAL HERNIA WITH GERD: ICD-10-CM

## 2024-04-18 RX ORDER — DICYCLOMINE HCL 20 MG
20 TABLET ORAL 4 TIMES DAILY
Qty: 360 TABLET | Refills: 1 | Status: SHIPPED | OUTPATIENT
Start: 2024-04-18

## 2024-04-18 RX ORDER — HYDROXYZINE HYDROCHLORIDE 25 MG/1
25 TABLET, FILM COATED ORAL NIGHTLY
Qty: 90 TABLET | Refills: 1 | Status: SHIPPED | OUTPATIENT
Start: 2024-04-18

## 2024-04-18 NOTE — TELEPHONE ENCOUNTER
Refill Request     CONFIRM preferred pharmacy with the patient.    If Mail Order Rx - Pend for 90 day refill.      Last Seen: Last Seen Department: 1/23/2024  Last Seen by PCP: 1/23/2024    Last Written: 02/28/2024 30 tab 3 refills     If no future appointment scheduled:  Review the last OV with PCP and review information for follow-up visit,  Route STAFF MESSAGE with patient name to the  Pool for scheduling with the following information:            -  Timing of next visit           -  Visit type ie Physical, OV, etc           -  Diagnoses/Reason ie. COPD, HTN - Do not use MEDICATION, Follow-up or CHECK UP - Give reason for visit      Next Appointment:   Future Appointments   Date Time Provider Department Center   5/2/2024  3:30 PM Trav Sotelo MD [facility]       Message sent to  to schedule appt with patient?  NO      Requested Prescriptions     Pending Prescriptions Disp Refills    hydrOXYzine HCl (ATARAX) 25 MG tablet [Pharmacy Med Name: HYDROXYZINE HCL 25 MG TABLET] 90 tablet 2     Sig: TAKE 1 TABLET BY MOUTH EVERY DAY AT NIGHT

## 2024-04-18 NOTE — TELEPHONE ENCOUNTER
.Refill Request     CONFIRM preferred pharmacy with the patient.    If Mail Order Rx - Pend for 90 day refill.      Last Seen: Last Seen Department: 1/23/2024  Last Seen by PCP: 1/23/2024    Last Written: 11-15-23 120 with 1     If no future appointment scheduled:  Review the last OV with PCP and review information for follow-up visit,  Route STAFF MESSAGE with patient name to the  Pool for scheduling with the following information:            -  Timing of next visit           -  Visit type ie Physical, OV, etc           -  Diagnoses/Reason ie. COPD, HTN - Do not use MEDICATION, Follow-up or CHECK UP - Give reason for visit      Next Appointment:   Future Appointments   Date Time Provider Department Center   5/2/2024  3:30 PM Trav Sotelo MD AND [facility]       Message sent to  to schedule appt with patient?  NO      Requested Prescriptions     Pending Prescriptions Disp Refills    dicyclomine (BENTYL) 20 MG tablet [Pharmacy Med Name: DICYCLOMINE 20 MG TABLET] 360 tablet 1     Sig: TAKE 1 TABLET BY MOUTH FOUR TIMES A DAY

## 2024-04-22 DIAGNOSIS — F41.9 ANXIETY AND DEPRESSION: ICD-10-CM

## 2024-04-22 DIAGNOSIS — F32.A ANXIETY AND DEPRESSION: ICD-10-CM

## 2024-04-22 RX ORDER — CITALOPRAM 20 MG/1
20 TABLET ORAL DAILY
Qty: 90 TABLET | Refills: 1 | Status: SHIPPED | OUTPATIENT
Start: 2024-04-22

## 2024-04-22 NOTE — TELEPHONE ENCOUNTER
Refill Request     CONFIRM preferred pharmacy with the patient.    If Mail Order Rx - Pend for 90 day refill.      Last Seen: Last Seen Department: 1/23/2024  Last Seen by PCP: 1/23/2024    Last Written: 10/26/23 90 with 1 refill     If no future appointment scheduled:  Review the last OV with PCP and review information for follow-up visit,  Route STAFF MESSAGE with patient name to the  Pool for scheduling with the following information:            -  Timing of next visit           -  Visit type ie Physical, OV, etc           -  Diagnoses/Reason ie. COPD, HTN - Do not use MEDICATION, Follow-up or CHECK UP - Give reason for visit      Next Appointment:   Future Appointments   Date Time Provider Department Center   5/2/2024  3:30 PM Trav Sotelo MD AND [facility]       Message sent to  to schedule appt with patient?  YES Return in about 6 months (around 4/4/2024) for anxiety and depression.        Requested Prescriptions     Pending Prescriptions Disp Refills    citalopram (CELEXA) 20 MG tablet [Pharmacy Med Name: CITALOPRAM HBR 20 MG TABLET] 90 tablet 1     Sig: TAKE 1 TABLET BY MOUTH EVERY DAY

## 2024-05-02 ENCOUNTER — OFFICE VISIT (OUTPATIENT)
Dept: ORTHOPEDIC SURGERY | Age: 34
End: 2024-05-02

## 2024-05-02 VITALS — WEIGHT: 260 LBS | BODY MASS INDEX: 40.81 KG/M2 | HEIGHT: 67 IN

## 2024-05-02 DIAGNOSIS — Z47.89 ORTHOPEDIC AFTERCARE: Primary | ICD-10-CM

## 2024-05-02 PROCEDURE — 99024 POSTOP FOLLOW-UP VISIT: CPT | Performed by: ORTHOPAEDIC SURGERY

## 2024-05-02 PROCEDURE — 1073RET COMPLETION OF WORKABILITY PRESCRIPTION: Performed by: ORTHOPAEDIC SURGERY

## 2024-05-02 NOTE — PROGRESS NOTES
POST OPERATIVE ORTHOPAEDIC NOTE    DOS: 2/7/2024  PROCEDURES: Left knee ACL reconstruction with BTB autograft and lateral meniscus repair    The patient has been recovering. The patient states the pain is 0/10 pain.  The patient has continued PT however the patient has not been the physical therapy in over 4 weeks.  Patient states that she still been using a crutch.  Her last physical therapy visit as documented was on 3/28/2024.  She states that she had logistical issues of not having a ride/car to be able to utilize.    Focused pertinent physical examination of the operative extremity:  Wounds C/D/I, well healed surgical incisions  No effusion  0/132 degrees active range of motion  Negative anterior drawer negative posterior drawer, 1A Lachman  Negative Lukas and nontender to palpation medial or lateral joint line  Positive VMO atrophy, no gross extensor lag  Skin intact throughout  5/5 IP Q H TA G EHL  SILT DP SP LP MP S S  +2 DP pulse     Diagnosis Orders   1. Orthopedic aftercare          Assessment and plan: The patient is now nearly 3 months status post the above listed procedure and is recovering    .    --Greater than 50% of the time (11/20 minutes) was spent coordinating care and discussing postoperative recovery course  -I had a pleasant discussion with the patient today and I reviewed with her the importance of getting into physical therapy for her postoperative recovery.  Currently her knee is stable on ligamentous testing  -She was educated to wean out of the neoprene knee sleeve as well as discontinue crutch.  She states that she will use until she sees therapy next week to ensure satisfactory gait mobilization.  -She has been using her quadriceps e-stim machine at home  -Currently she will not anticipate returning to work until 1 June 2024 and at that point she will not have restrictions.  She works a strenuous job at [employer] and is concerned about pivoting knee motions.  This will also allow her

## 2024-05-06 ENCOUNTER — TELEPHONE (OUTPATIENT)
Dept: ORTHOPEDIC SURGERY | Age: 34
End: 2024-05-06

## 2024-05-06 NOTE — TELEPHONE ENCOUNTER
After Visit Summary   7/19/2017    Anthony Carbone    MRN: [id number]           Patient Information     Date Of Birth          1942        Visit Information        Provider Department      7/19/2017 3:00 PM  26 ATC;  ONCOLOGY INFUSION [facility]        Today's Diagnoses     Multiple myeloma in relapse (H)    -  1      Care Instructions    Contact Numbers  [facility]: [phone]    After Hours:  [phone]  Triage: [phone]    Please call the [facility] Triage line if you experience a temperature greater than or equal to 100.5, shaking chills, have uncontrolled nausea, vomiting and/or diarrhea, dizziness, shortness of breath, chest pain, bleeding, unexplained bruising, or if you have any other new/concerning symptoms, questions or concerns.     If it is after hours, weekends, or holidays, please call the main hospital  at  [phone] and ask to speak to the Oncology doctor on call.     If you are having any concerning symptoms or wish to speak to a provider before your next infusion visit, please call your care coordinator or triage to notify them so we can adequately serve you.     If you need a refill on a narcotic prescription or other medication, please call triage before your infusion appointment.         July 2017 Sunday Monday Tuesday Wednesday Thursday Friday Saturday                                 1       2     3     4     5     6     7     8       9     10     11     [facility] LAB DRAW    2:45 PM   (15 min.)   [facility] LAB DRAW   [facility] Lab Draw     P RETURN    3:05 PM   (50 min.)   Leanne Reddy PA-C   [facility] ONC INFUSION 60    4:00 PM   (60 min.)    ONCOLOGY INFUSION   [facility] 12     [facility] ONC INFUSION 60   11:00 AM   (60 min.)    ONCOLOGY INFUSION   [facility] LAB DRAW    3:30 PM   (15 min.)   [facility]    Faxed updated ADA to [employer] @ [phone]         LAB DRAW   [facility] Lab Draw 13     14     15       16     17     18     [facility] DRAW   11:15 AM   (15 min.)    [facility] LAB DRAW   [facility] Lab Draw     [facility] RETURN   11:55 AM   (50 min.)   Lashay Romero PA-C   [facility]     [facility] ONC INFUSION 60    3:00 PM   (60 min.)    ONCOLOGY INFUSION   [facility] 19     [facility] ONC INFUSION 60    3:00 PM   (60 min.)    ONCOLOGY INFUSION   [facility] 20     21     22       23     24     25     [facility] LAB DRAW   11:15 AM   (15 min.)    [facility] LAB DRAW   [facility] Lab Draw     [facility] RETURN   11:35 AM   (50 min.)   Leanne Reddy PA-C   [facility]     [facility] ONC INFUSION 60    1:00 PM   (60 min.)    ONCOLOGY INFUSION   [facility] 26     [facility] ONC INFUSION 60   12:00 PM   (60 min.)    ONCOLOGY INFUSION   [facility] 27     PHACOEMULSIFICATION CLEAR CORNEA WITH STANDARD INTRAOCULAR LENS IMPLANT   12:30 PM   Tesfaye Yusuf MD    EC 28     29       30     31     [facility] RETURN ARRHYTHMIA    5:45 PM   (20 min.)   Dmitri Banks MD   [facility]                                     August 2017 Sunday Monday Tuesday Wednesday Thursday Friday Saturday             1     2     3     PHACOEMULSIFICATION CLEAR CORNEA WITH STANDARD INTRAOCULAR LENS IMPLANT    1:30 PM   Tesfaye Yusuf MD   [facility] 4     5       6     7     8     9     [facility] LAB DRAW    3:30 PM   (15 min.)    [facility] LAB DRAW   [facility] Lab Draw     [facility] ONC INFUSION 60    4:00 PM   (60 min.)    ONCOLOGY INFUSION   [facility]     UMP ONC RETURN    5:00 PM   (30 min.)   Kamari Topete MD   [facility] 10     [facility] ONC INFUSION 60   12:00 PM   (60 min.)    ONCOLOGY INFUSION   [facility] 11     12       13     14     15     16     17     [facility]  LAB DRAW   12:30 PM   (15 min.)   [facility] LAB DRAW   [facility] Draw     [facility] ONC INFUSION 60    1:00 PM   (60 min.)   [facility] ONCOLOGY INFUSION   [facility] 18     [facility] ONC INFUSION 60    1:00 PM   (60 min.)   [facility] ONCOLOGY INFUSION   [facility] 19       20     21     22     23     24     [facility] LAB DRAW   12:30 PM   (15 min.)    [facility] LAB DRAW   [facility]     [facility] ONC INFUSION 60    1:00 PM   (60 min.)   [facility] ONCOLOGY INFUSION   [facility] 25     [facility] ONC INFUSION 60    1:00 PM   (60 min.)   [facility] ONCOLOGY INFUSION   [facility] 26       27     28     29     30     31                            Lab Results:  No results found for this or any previous visit (from the past 12 hour(s)).            Follow-ups after your visit        Your next 10 appointments already scheduled     Jul 25, 2017 11:15 AM CDT   [facility] Lab Draw with  [facility] LAB DRAW   [facility] Lab Draw ([facility])    [address] [place] [id number]   [phone]            Jul 25, 2017 11:50 AM CDT   (Arrive by 11:35 AM)   Return Visit with Leanne Reddy PA-C   [facility] ([facility])    [address] [place] [id number]   [phone]            Jul 25, 2017  1:00 PM CDT   Infusion 60 with UC ONCOLOGY INFUSION, UC 11 ATC   [facility] ([facility])    [address] [address]   [phone]            Jul 26, 2017 12:00 PM CDT   Infusion 60 with UC ONCOLOGY INFUSION, UC 28 ATC   [facility] ([facility])    [address] [address]   [phone]            Jul 27, 2017   Pro[facility] with [facility]   [facility]    [address]  [facility] [address]   [phone]            Jul 31, 2017  6:00 PM CDT   (Arrive by 5:45 PM)   RETURN ARRHYTHMIA with Dmitri Banks MD   [facility] ([facility])    [address]  [address] [facility] [address]   [phone]            Aug 03, 2017   Procedure with Tesfaye Yusuf MD   [facility] (--)    [address]  [facility] [address]   [phone]            Aug 09, 2017  3:30 PM CDT   [facility] Lab Draw with  [facility] LAB DRAW   [facility] Draw ([facility])    [address]  [address] [facility] [address]   [phone]              Who to contact     If you have questions or need follow up information about today's clinic visit or your schedule please contact [facility] directly at [phone].  Normal or non-critical lab and imaging results will be communicated to you by [employer], letter or phone within 4 business days after the clinic has received the results. If you do not hear from us within 7 days, please contact the clinic through [employer] or phone. If you have a critical or abnormal lab result, we will notify you by phone as soon as possible.  Submit refill requests through [employer] or call your pharmacy and they will forward the refill request to us. Please allow 3 business days for your refill to be completed.          Additional Information About Your Visit        [employer] Information     [employer] gives you secure access to your electronic health record. If you see a primary care provider, you can also send messages to your care team and make appointments. If you have questions, please call your primary care clinic.  If you do not have a primary care provider, please call [phone] and they will assist you.        Care EveryWhere ID     This is your Care EveryWhere ID. This could be used by other organizations to  access your [place] medical records  [id number]        Your Vitals Were     Pulse Temperature Respirations             66 98.7  F (37.1  C) (Oral) 16          Blood Pressure from Last 3 Encounters:   07/19/17 (!) 167/101   07/18/17 133/77   07/12/17 160/90    Weight from Last 3 Encounters:   07/18/17 57.4 kg (126 lb 8 oz)   07/11/17 57.9 kg (127 lb 11.2 oz)   06/27/17 56.7 kg (125 lb)              Today, you had the following     No orders found for display       Primary Care Provider Office Phone # Fax #    Sanket Burciaga [phone]       [facility] [address] [facility] [address]        Equal Access to Services     ALBAN SHAH : Maribel Davis, wanadine luqadaha, qaybta kaalmada adeclaudiayaabhijit, jonny jurado . So [facility] [place] [facility] [phone].    ATENCIÓN: Si habla español, tiene a todd disposición servicios gratuitos de asistencia lingüística. Llame al [phone].    We comply with applicable federal civil rights laws and [place] laws. We do not discriminate on the basis of race, color, national origin, age, disability sex, sexual orientation or gender identity.            Thank you!     Thank you for choosing [facility]  for your care. Our goal is always to provide you with excellent care. Hearing back from our patients is one way we can continue to improve our services. Please take a few minutes to complete the written survey that you may receive in the mail after your visit with us. Thank you!             Your Updated Medication List - Protect others around you: Learn how to safely use, store and throw away your medicines at www.disposemymeds.org.          This list is accurate as of: 7/19/17  4:21 PM.  Always use your most recent med list.                   Brand Name Dispense Instructions for use Diagnosis    acetaminophen 500 MG tablet    TYLENOL     Take 2 tablets (1,000 mg) by mouth every 6 hours as needed for mild  pain    Acute bilateral low back pain without sciatica, Multiple myeloma in relapse (H)       acyclovir 400 MG tablet    ZOVIRAX    60 tablet    Take 1 tablet (400 mg) by mouth 2 times daily    Multiple myeloma in relapse (H)       amLODIPine 5 MG tablet    NORVASC    30 tablet    Take one tablet at bedtime.    Essential hypertension       amoxicillin-clavulanate 875-125 MG per tablet    AUGMENTIN    14 tablet    Take 1 tablet by mouth 2 times daily    Urinary tract infection, site unspecified       artificial saliva Liqd liquid      Swish and spit 3-5 mLs in mouth 4 times daily as needed for dry mouth    Dry mouth       * clopidogrel 75 MG tablet    PLAVIX    30 tablet    Take 1 tablet (75 mg) by mouth daily    History of stroke       * clopidogrel 75 MG tablet    PLAVIX    90 tablet    TAKE 1 TABLET BY MOUTH EVERY DAY    History of stroke       dexamethasone 4 MG tablet    DECADRON    30 tablet    Take 20 mg days 1,2, 8, 9, 15, 16 on days of kyprolis    Multiple myeloma not having achieved remission (H)       esomeprazole 40 MG CR capsule    nexIUM    30 capsule    Take 1 capsule (40 mg) by mouth every morning (before breakfast)    Gastroesophageal reflux disease without esophagitis       HYDROmorphone 2 MG tablet    DILAUDID    30 tablet    Take 0.5 tablets (1 mg) by mouth every 3 hours as needed for moderate to severe pain    Multiple myeloma in relapse (H), Acute bilateral low back pain without sciatica       lidocaine 5 % Patch    LIDODERM    30 patch    Place 3 patches onto the skin every 24 hours    Multiple myeloma in relapse (H), Acute bilateral low back pain without sciatica       LORazepam 0.5 MG tablet    ATIVAN    30 tablet    Take 1 tablet (0.5 mg) by mouth every 6 hours as needed for nausea or anxiety.    Multiple myeloma in relapse (H), Delirium       methylphenidate 5 MG tablet    RITALIN    15 tablet    Take 1 tablet (5 mg) by mouth 2 times daily    Other fatigue       midodrine 2.5 MG tablet     PROAMATINE    90 tablet    Take 1 tablet (2.5 mg) by mouth 3 times daily . HOLD due to elevated blood pressures during admission. Continue to hold until further advised by outpatient clinic team or if develops recurrent orthostatic hypotension.    Orthostatic hypotension, Orthostatic syncope       OLANZapine 5 MG tablet    zyPREXA    60 tablet    Take 1 tablet (5 mg) by mouth 2 times daily    Delirium, Multiple myeloma in relapse (H)       ondansetron 8 MG ODT tab    ZOFRAN-ODT    30 tablet    Take 1 tablet (8 mg) by mouth every 8 hours as needed for nausea    Nausea       potassium chloride SA 20 MEQ CR tablet    K-DUR/KLOR-CON M    60 tablet    TAKE 1 TABLET BY MOUTH TWICE DAILY.    Hypokalemia       simethicone 80 MG chewable tablet    MYLICON    180 tablet    Take 2 tablets (160 mg) by mouth 4 times daily as needed for cramping or other (gas pain)    Nausea       simvastatin 5 MG tablet    ZOCOR    30 tablet    Take 4 tablets (20 mg) by mouth daily    Mixed hyperlipidemia       sucralfate 1 GM tablet    CARAFATE    120 tablet    Take 1 tablet (1 g) by mouth 4 times daily as needed    Gastroesophageal reflux disease without esophagitis       traMADol 50 MG tablet    ULTRAM    60 tablet    Take 0.5-1 tablets (25-50 mg) by mouth every 6 hours as needed for moderate pain . Recommend trying after Tylenol and before Dilaudid.    Acute bilateral low back pain without sciatica, Multiple myeloma in relapse (H)       * Notice:  This list has 2 medication(s) that are the same as other medications prescribed for you. Read the directions carefully, and ask your doctor or other care provider to review them with you.

## 2024-05-10 DIAGNOSIS — R09.81 NASAL CONGESTION: ICD-10-CM

## 2024-05-10 RX ORDER — FLUTICASONE PROPIONATE 50 MCG
2 SPRAY, SUSPENSION (ML) NASAL DAILY
Qty: 1 EACH | Refills: 1 | Status: SHIPPED | OUTPATIENT
Start: 2024-05-10

## 2024-05-15 ENCOUNTER — PATIENT MESSAGE (OUTPATIENT)
Dept: ORTHOPEDIC SURGERY | Age: 34
End: 2024-05-15

## 2024-05-15 NOTE — TELEPHONE ENCOUNTER
Patient:   JILL LUBIN            MRN: [id number]            FIN: [id number]               Age:   5 years     Sex:  Female     :  2014   Associated Diagnoses:   None   Author:   Yomi KIM, Jamir AMIN      Basic Information   Time seen: Date & time 2020 08:39:00.   History source: Mother, father, family.   Arrival mode: Private vehicle.   History limitation: Patient's age, Patients age .   Additional information: Chief Complaint from Nursing Triage Note : Chief Complaint   2020 8:31           Chief Complaint           Pt to ED with c/o fever, flu like symtoms.  .      History of Present Illness   Patient is a 5-year-old female who presents for evaluation of approximate 3 to 4 days of flulike symptoms with rhinorrhea, nasal congestion, cough, and body aches.  Patient was seen by PCP ([facility]) 2 days ago and started on Tamiflu.  Mother reports the child has had continued fevers with 2 episodes of vomiting yesterday.  No diarrhea.  No abdominal pain.  Patient has not received any antipyretics today.  Patient is fully vaccinated.  No past hospitalizations.  Numerous contacts with flulike symptoms and family.  No change in fluid intake or urine output..        Review of Systems   Constitutional symptoms:  Fever, No decreased activity,    Skin symptoms:  No rash,    Eye symptoms:  No discharge,    ENMT symptoms:  No nasal congestion,    Respiratory symptoms:  Cough, No shortness of breath,    Cardiovascular symptoms:  No history of heart murmurs.   Gastrointestinal symptoms:  No vomiting, no diarrhea.    Genitourinary symptoms:  Normal urine output.   Musculoskeletal symptoms:  No joint swelling.   Neurologic symptoms:  No weakness.      Health Status   Allergies:    Allergic Reactions (Selected)  NKA.   Medications:  (Selected)   Inpatient Medications  Ordered  Motrin Childrens 100 mg/5 mL oral suspension: 180 mg, 9 mL, PO, One Time  acetaminophen (Tylenol) pediatric oral liquid: 270 mg,  From: Laney Pineda  To: Dr. Trav Sotelo  Sent: 5/15/2024 9:45 AM EDT  Subject: Short term    I was just following up about the short term disability paperwork. My employer hasn’t received anything they said.    8.31 mL, PO, One Time  ondansetron oral liquid: 3 mg, 3.75 mL, PO, Once  Prescriptions  Prescribed  Zofran 4 mg/5 ml oral solution: 2 mg, 2.5 mL, PO, TID, 25 mL, 0 Refill(s)  Zofran ODT 4 mg oral tablet, disintegrating: .5 tab(s), PO, TID, for 3 day, 5 tab, 0 Refill(s).   Immunizations: Up to date.      Past Medical/ Family/ Social History      Maternal History   No significant maternal complications    History: No significant  complications.   Surgical history: Negative.   Family history: Not significant.   Social history: Not significant.   Problem list:    Active Problems (1)  No Chronic Problems   .      Physical Examination               Vital Signs   Vital Signs   2020 8:31           Temperature Tympanic      103.4 F  HI                             Peripheral Pulse Rate     118 bpm  HI                             Respiratory Rate          22 br/min                             Systolic Blood Pressure   100 mmHg                             Diastolic Blood Pressure  62 mmHg                             Mean Arterial Pressure    75 mmHg                             Oxygen Saturation         98 %    2020 8:16           Temperature Oral          101.6 F  HI                             Peripheral Pulse Rate     130 bpm  HI                             Respiratory Rate          28 br/min                             Systolic Blood Pressure   96 mmHg                             Diastolic Blood Pressure  63 mmHg                             Mean Arterial Pressure    74 mmHg                             Oxygen Saturation         98 %  .               Per nurse's notes.   Vital signs were reviewed.   General:  Appropriate for age, no acute distress, Happy, smiling, interactive with examiner, Not ill-appearing,    Skin:  Warm, dry.    Head:  Normocephalic, atraumatic.    Neck:  Supple, trachea midline, no tenderness, No nuchal rigidity. .    Eye:  Pupils are equal, round and reactive to light,  extraocular movements are intact, normal conjunctiva.    Ears, nose, mouth and throat:  Oral mucosa moist, no pharyngeal erythema or exudate, No tenderness to bilateral mastoids. Bilateral EACs are clear. Left TM WNL. Right TM WNL. .    Cardiovascular:  No murmur, Normal peripheral perfusion, No edema.    Respiratory:  Lungs are clear to auscultation, respirations are non-labored, breath sounds are equal, Symmetrical chest wall expansion, No tachypnea, No retractions, No use of accessory muscles. .    Chest wall:  No deformity.   Back:  Nontender.   Musculoskeletal:  Normal ROM, no tenderness.    Gastrointestinal:  Soft, Nontender, Non distended, Normal bowel sounds, No organomegaly.    Neurological:  No focal neurological deficits.   Lymphatics:  No lymphadenopathy.      Medical Decision Making   Differential Diagnosis:  Fever, viral syndrome, pneumonia, bronchitis, otitis media, upper respiratory infection, sinusitis, urinary tract infection, pyelonephritis, pharyngitis, gastroenteritis, sepsis, influenza, cellulitis, meningitis.    Rationale:  Patient is a 5-year-old female with history and PE as above.  Patient presents with known influenza.  Symptoms have been going on over the past several days.  Patient is currently on Tamiflu.  Patient has not received any antipyretics in the preceding 15 hours which is likely responsible for her continued fever.  Patient was given Zofran and antipyretics with improvement in fever.  Patient had no emesis while in ED.  Patient had significant improvement in heart rate and fever while in ED.  Advised mother to continue Tamiflu as instructed.  Patient was getting underdosed with antipyretics when she was receiving them at home.  Advised mother on importance of diligent use of antipyretics.  Patient also discharged home with short course of Zofran..      Impression and Plan   Diagnosis   1.  Influenza-like illness 2.  Nausea and vomiting   Plan   Condition: Stable.     Disposition: Discharged: to home.    Prescriptions: Launch prescriptions   Pharmacy:  Tylenol Childrens 160 mg/5 mL oral suspension (Prescribe): 268.8 mg, 8.4 mL, PO, q4hr, for 3 day, Supervising Physician Dr. Karie SPEARSI [id number], 151.2 mL, 0 Refill(s)  Motrin Childrens 100 mg/5 mL oral suspension (Prescribe): 180 mg, 9 mL, PO, q6hr, for 3 day, Supervising Physician Dr. Karie MOTT [id number], 108 mL, 0 Refill(s)  ondansetron 4 mg/5 mL oral solution (Prescribe): 2 mg, 2.5 mL, PO, TID, for 3 day, Dr. Karie MOTT [id number], 22.5 mL, 0 Refill(s).    Patient was given the following educational materials: Viral Infections, Nausea and Vomiting, Nausea and Vomiting, Viral Infections.    Follow up with: Follow up with primary care provider Within 3 to 5 days Please give medication as prescribed.  Please make sure she is drinking plenty fluids.  Please follow-up with pediatrician for repeat exam in 2 to 3 days.  Please return for any new or worsening symptoms..    Counseled: Family, Regarding diagnosis, Regarding treatment plan, Regarding prescription.             Electronically Signed On 01.22.2020 10:04  ___________________________________________________   Jamir Newman

## 2024-05-22 ENCOUNTER — PATIENT MESSAGE (OUTPATIENT)
Dept: ORTHOPEDIC SURGERY | Age: 34
End: 2024-05-22

## 2024-05-23 ENCOUNTER — TELEPHONE (OUTPATIENT)
Dept: ORTHOPEDIC SURGERY | Age: 34
End: 2024-05-23

## 2024-05-23 NOTE — TELEPHONE ENCOUNTER
From: Laney Pineda  To: Dr. Trav Sotelo  Sent: 5/22/2024 8:13 PM EDT  Subject: Attention Paige Hernandez    My employer has notified me that they need additional information so approve my short term disability and to prepare my return back to work. I have attached the documents they sent me. Thank you so much for your help

## 2024-05-23 NOTE — TELEPHONE ENCOUNTER
Good afternoon Laney,    The paperwork has been completed and faxed to [employer].     Thanks,   Dustin

## 2024-05-30 ENCOUNTER — TELEMEDICINE (OUTPATIENT)
Dept: FAMILY MEDICINE CLINIC | Age: 34
End: 2024-05-30
Payer: COMMERCIAL

## 2024-05-30 DIAGNOSIS — F33.1 MODERATE EPISODE OF RECURRENT MAJOR DEPRESSIVE DISORDER (HCC): ICD-10-CM

## 2024-05-30 PROCEDURE — G8417 CALC BMI ABV UP PARAM F/U: HCPCS | Performed by: PHYSICIAN ASSISTANT

## 2024-05-30 PROCEDURE — G8427 DOCREV CUR MEDS BY ELIG CLIN: HCPCS | Performed by: PHYSICIAN ASSISTANT

## 2024-05-30 PROCEDURE — 99214 OFFICE O/P EST MOD 30 MIN: CPT | Performed by: PHYSICIAN ASSISTANT

## 2024-05-30 PROCEDURE — 1036F TOBACCO NON-USER: CPT | Performed by: PHYSICIAN ASSISTANT

## 2024-05-30 RX ORDER — CITALOPRAM 40 MG/1
40 TABLET ORAL DAILY
Qty: 90 TABLET | Refills: 1 | Status: SHIPPED | OUTPATIENT
Start: 2024-05-30 | End: 2024-11-26

## 2024-05-30 ASSESSMENT — ENCOUNTER SYMPTOMS
SHORTNESS OF BREATH: 0
WHEEZING: 0

## 2024-05-30 NOTE — PROGRESS NOTES
Abnormal - depressed mood and affect       [x] No Hallucinations    Other pertinent observable physical exam findings:- normal cognition and judgement, pleasant, cooperative, future oriented             --Ashley Patel PA

## 2024-06-01 DIAGNOSIS — F32.A ANXIETY AND DEPRESSION: ICD-10-CM

## 2024-06-01 DIAGNOSIS — F41.0 PANIC DISORDER: ICD-10-CM

## 2024-06-01 DIAGNOSIS — F41.9 ANXIETY AND DEPRESSION: ICD-10-CM

## 2024-06-01 NOTE — TELEPHONE ENCOUNTER
Refill Request     CONFIRM preferred pharmacy with the patient.    If Mail Order Rx - Pend for 90 day refill.      Last Seen: Last Seen Department: 5/30/2024  Last Seen by PCP: 12/18/2023    Last Written: 12/4/2023    If no future appointment scheduled:  Review the last OV with PCP and review information for follow-up visit,  Route STAFF MESSAGE with patient name to the  Pool for scheduling with the following information:            -  Timing of next visit           -  Visit type ie Physical, OV, etc           -  Diagnoses/Reason ie. COPD, HTN - Do not use MEDICATION, Follow-up or CHECK UP - Give reason for visit      Next Appointment:   Future Appointments   Date Time Provider Department Center   6/5/2024  8:45 AM Spencer Padilla DO [facility]   8/1/2024  3:15 PM Trav Sotelo MD [facility]       Message sent to  to schedule appt with patient?  NO      Requested Prescriptions     Pending Prescriptions Disp Refills    busPIRone (BUSPAR) 10 MG tablet [Pharmacy Med Name: BUSPIRONE HCL 10 MG TABLET] 180 tablet 1     Sig: TAKE 1 TABLET BY MOUTH TWICE A DAY

## 2024-06-03 RX ORDER — BUSPIRONE HYDROCHLORIDE 10 MG/1
10 TABLET ORAL 2 TIMES DAILY
Qty: 180 TABLET | Refills: 1 | Status: SHIPPED | OUTPATIENT
Start: 2024-06-03

## 2024-06-14 ENCOUNTER — PATIENT MESSAGE (OUTPATIENT)
Dept: ORTHOPEDIC SURGERY | Age: 34
End: 2024-06-14

## 2024-06-19 ENCOUNTER — OFFICE VISIT (OUTPATIENT)
Dept: BARIATRICS/WEIGHT MGMT | Age: 34
End: 2024-06-19

## 2024-06-19 VITALS
WEIGHT: 293 LBS | SYSTOLIC BLOOD PRESSURE: 127 MMHG | HEIGHT: 67 IN | BODY MASS INDEX: 45.99 KG/M2 | HEART RATE: 68 BPM | DIASTOLIC BLOOD PRESSURE: 85 MMHG

## 2024-06-19 DIAGNOSIS — E66.01 MORBID OBESITY WITH BMI OF 45.0-49.9, ADULT (HCC): ICD-10-CM

## 2024-06-19 DIAGNOSIS — Z98.84 S/P LAPAROSCOPIC SLEEVE GASTRECTOMY: Primary | ICD-10-CM

## 2024-06-19 NOTE — PROGRESS NOTES
[facility]   General & Laparoscopic Surgery  [facility]       HPI:     Laney Pineda is a very pleasant 33 y.o. obese female , Body mass index is 46.52 kg/m².. And multiple medical problems who is presenting for bariatric follow up care.   Laney Pineda is s/p laparoscopic sleeve gastrectomy by me   Comes today to the clinic without any complaints. Patient denies any nausea, vomiting, fevers, chills, shortness of breath, chest pain, constipation or urinary symptoms. Denies any heartburn nor dysphagia.           Past Medical History:   Diagnosis Date    Acid reflux     Breast disorder     Breast tenderness     Broken tooth     COVID-19 12/2020    Depression     Migraine     Nipple discharge     Postpartum depression     Stress incontinence     Trichomoniasis      Past Surgical History:   Procedure Laterality Date    CHOLECYSTECTOMY      KNEE SURGERY Left 02/07/2024    VIDEO ARTHROSCOPY LEFT KNEE, ANTERIOR CRUCIATE LIGAMENT RECONSTRUCTION WITH BONE TENDON BONE AUTOGRAFT, LATERAL MENISCUS REPAIR, performed by Trav Sotelo MD at [facility] OR    SLEEVE GASTRECTOMY N/A 09/15/2020    ROBOTIC ASSISTED LAPAROSCOPIC VERTICAL SLEEVE GASTRECTOMY, HIATAL HERNIA REPAIR performed by Spencer Padilla DO at [facility] OR    UPPER GASTROINTESTINAL ENDOSCOPY N/A 06/08/2020    EGD BIOPSY performed by Spencer Padilla DO at [facility] ENDOSCOPY     Family History   Problem Relation Age of Onset    Cancer Mother         cervical cancer    Other Mother         hyperactive thyroid    Depression Father     Heart Disease Maternal Grandmother     Hypertension Maternal Grandmother     Elevated Lipids Maternal Grandmother     Heart Disease Maternal Grandfather     Hypertension Maternal Grandfather     Elevated Lipids Maternal Grandfather     Breast Cancer Paternal Grandmother 55    Breast Cancer Paternal Grandfather     Other Maternal Aunt         hyperactive thyroid     Social History     Tobacco Use    Smoking

## 2024-06-19 NOTE — PROGRESS NOTES
Dietary Assessment Note      Vitals:   Vitals:    24 1301   BP: 127/85   Pulse: 68   Weight: 134.7 kg (297 lb)   Height: 1.702 m (5' 7\")    Patient gained 54 lbs over 1.5 years    Total Weight Loss: 10 lbs    Labs reviewed: no lab studies available for review at time of visit    Protein intake: 60-80 grams/day     Fluid intake: 48-64 oz/day water, 1 coffee + cream + sugar, soda occasionally     Multivitamin/mineral intake: no    Calcium intake: no    Other: none    Exercise:  Just got cleared from ACL/meniscus repair; normally gets a lot of steps at work    Nutrition Assessment: 3 yr 9 mos post-op visit.  Eating ~2X/day  B: oatmeal + apples + raisins OR Marco Murali breakfast bowl  L: nothing   S: candy bar   D: chx/steak + veggies OR spaghetti     Amount able to eat per sittin oz protein + 1 cup sides     Following 3030/30 rule: waits 30 min before and after; eats within 15 min     Food Intolerances/issues:  fish    Client Concerns: none    Handout: 1200 kcal POMP (with post-it of 1500 &1800 kcal portions), 9 inch plate method    Goals:   - Utilize meal plan for portions and structure of plate  - Start with 1800 kcal/day for one month, then drop to 1500 kcal/ day for 1 month, and finally drop to 1200 kcal/day  - Refer to behaviorist  - Refer to [place]  - Restart MVI and calcium regimen    Plan: f/u per provider    VAZQUEZ GARCIA, MS, RD, LD

## 2024-07-01 ENCOUNTER — PATIENT MESSAGE (OUTPATIENT)
Dept: ORTHOPEDIC SURGERY | Age: 34
End: 2024-07-01

## 2024-07-01 NOTE — TELEPHONE ENCOUNTER
From: Laney Pineda  To: Dr. Trav Sotelo  Sent: 7/1/2024 3:24 PM EDT  Subject: Accommodation follow up    I sent my work site the letter that was recently sent to me about accommodations at work and they are requesting this form to be filled out again with the updated dates on it. They have put me on leave again until my site can look over and approve or deny my requests. I talked to the  on my case and they said with filling out this paperwork then I can continue with long term disability payments so I’m not without income. Just a recap of the accommodations that are being requested. I am asking for breaks after long periods of walking and to avoid stairs. I am having a terrible time with both of these things.

## 2024-07-15 ENCOUNTER — TELEMEDICINE (OUTPATIENT)
Dept: BARIATRICS/WEIGHT MGMT | Age: 34
End: 2024-07-15
Payer: COMMERCIAL

## 2024-07-15 DIAGNOSIS — Z98.84 S/P LAPAROSCOPIC SLEEVE GASTRECTOMY: Primary | ICD-10-CM

## 2024-07-15 PROCEDURE — 96156 HLTH BHV ASSMT/REASSESSMENT: CPT | Performed by: SOCIAL WORKER

## 2024-07-15 NOTE — PROGRESS NOTES
Laney Pineda is a 33 y.o. female who presents today for individual counseling encounter / psychosocial assessment related to behavioral health.  Laney Pineda is presented oriented and engaged throughout assessment. Patient appeared with appropriate insight and cognition. Pt is referred to therapist by Dr. Padilla. Pt meets criteria for S/P laparoscopic sleeve gastrectomy.    Presenting Problem:   Pt has had significant weight gain since her last post surgical follow up with Dr. Padilla in 2022. Pt has an appointment with Dr. Noel for MWM in August. Pt is interested in weight loss medications.    Home life / Family relationships / Supports:   Pt is  with 2 children. She reports her  is very supportive of her. He was supportive of her decision to pursue weight loss surgery.    Hobbies/Positives:   Pt enjoys swimming and gardening    Employment hx  Pt works in the [employer]. She has been out of work since December 2023 due to a knee injury, which has been repaired surgically this year. Pt has a 2nd job in a [employer] shop.    Psychiatric hx  Pt reports taking medication for anxiety and depression. Medications are managed by her PCP and are reviewed approximately every 3 months.    Hx of emotional/stress/bored eating:   Pt reports she is not getting enough protein during her day. She reports eating \"maybe 2 times a day. Everything I was eating was hurting my stomach. I just got on new medication for IBS and it's better now.\"    Daily Health Concerns/Limitations  IBS     Substance Use:  Per pt \"I would say maybe on the weekends (alcohol intake) if I had to break it down, maybe 4 times a month.\"    Current needs / Limitations   Per Pt \"just me in my own head.\"    Additional Questions/Concerns per patient  None at this time    Behaviorist overview:   Therapist utilized active listening and motivational interviewing skills to assess patient need. Pt is motivated to be successful with getting her

## 2024-07-19 DIAGNOSIS — R09.81 NASAL CONGESTION: ICD-10-CM

## 2024-07-19 RX ORDER — FLUTICASONE PROPIONATE 50 MCG
2 SPRAY, SUSPENSION (ML) NASAL DAILY
Qty: 1 EACH | Refills: 1 | Status: SHIPPED | OUTPATIENT
Start: 2024-07-19

## 2024-07-19 NOTE — TELEPHONE ENCOUNTER
Refill Request     CONFIRM preferred pharmacy with the patient.    If Mail Order Rx - Pend for 90 day refill.      Last Seen: Last Seen Department: 5/30/2024  Last Seen by PCP: 12/18/2023    Last Written: 5/10/24 1 each 1 refills    If no future appointment scheduled:  Review the last OV with PCP and review information for follow-up visit,  Route STAFF MESSAGE with patient name to the  Pool for scheduling with the following information:            -  Timing of next visit           -  Visit type ie Physical, OV, etc           -  Diagnoses/Reason ie. COPD, HTN - Do not use MEDICATION, Follow-up or CHECK UP - Give reason for visit      Next Appointment:   Future Appointments   Date Time Provider Department Center   8/1/2024  3:15 PM Trav Sotelo MD [facility]   8/22/2024 11:00 AM Benito Noel MD [facility]   3/19/2025 12:00 PM Spencer Padilla DO [facility]       Message sent to  to schedule appt with patient?  NO      Requested Prescriptions     Pending Prescriptions Disp Refills    fluticasone (FLONASE) 50 MCG/ACT nasal spray [Pharmacy Med Name: FLUTICASONE PROP 50 MCG SPRAY]  1     Sig: SPRAY 2 SPRAYS INTO EACH NOSTRIL EVERY DAY

## 2024-08-01 ENCOUNTER — OFFICE VISIT (OUTPATIENT)
Dept: ORTHOPEDIC SURGERY | Age: 34
End: 2024-08-01

## 2024-08-01 VITALS — BODY MASS INDEX: 45.99 KG/M2 | HEIGHT: 67 IN | WEIGHT: 293 LBS

## 2024-08-01 DIAGNOSIS — Z98.890 STATUS POST RECONSTRUCTION OF ANTERIOR CRUCIATE LIGAMENT: Primary | ICD-10-CM

## 2024-08-01 NOTE — PROGRESS NOTES
POST OPERATIVE ORTHOPAEDIC NOTE    DOS: 2/7/2024  PROCEDURES: Left knee ACL reconstruction with BTB autograft and lateral meniscus repair    The patient has been recovering. The patient states the pain is 0/10.  The patient has continued to home exercise program taught to her by PT however self admits to not getting the physical therapy after her 3-month postop visit as what we had anticipated.  She denies any instability in the knee or mechanical twisting knee pain    Focused pertinent physical examination of the operative extremity:  Wound C/D/I, well healed surgical incision  0/135 degrees active range of motion.  Negative anterior drawer, negative posterior drawer, 1A Lachman  Nontender to palpation medial/lateral joint evan, negative Lukas medial and lateral  Improved VMO atrophy  Skin intact throughout  5/5 IP Q H TA G EHL  SILT DP SP LP MP S S  +2 DP pulse     Diagnosis Orders   1. Status post reconstruction of anterior cruciate ligament  Ambulatory referral to Physical Therapy        Assessment and plan: The patient is now 6 months status post the above listed procedure and is recovering    .    --Greater than 50% of the time (11/20 minutes) was spent coordinating care and discussing postoperative recovery course  -I had a pleasant discussion with the patient today and I reviewed with her that currently her clinical examination is stable.  She feels that she is doing well from an overall recovery perspective however she does have some occasional discomfort here there.  I did review with her that some of the symptoms that she is having are likely knee chondromalacia related symptoms as a pertains to her patella additionally which was rereviewed with her again upon assessment and discussion of her intraoperative findings once again.  We did review with her overall height to weight ratio and her continued efforts at continuing to decrease her BMI which will pay dividends with regard to her overall symptom

## 2024-08-14 DIAGNOSIS — S83.272A COMPLEX TEAR OF LATERAL MENISCUS OF LEFT KNEE AS CURRENT INJURY, INITIAL ENCOUNTER: ICD-10-CM

## 2024-08-14 DIAGNOSIS — M94.262 CHONDROMALACIA OF LEFT KNEE: ICD-10-CM

## 2024-08-15 RX ORDER — MELOXICAM 15 MG/1
15 TABLET ORAL DAILY PRN
Qty: 30 TABLET | Refills: 0 | OUTPATIENT
Start: 2024-08-15

## 2024-08-17 DIAGNOSIS — S83.272A COMPLEX TEAR OF LATERAL MENISCUS OF LEFT KNEE AS CURRENT INJURY, INITIAL ENCOUNTER: ICD-10-CM

## 2024-08-17 DIAGNOSIS — M94.262 CHONDROMALACIA OF LEFT KNEE: ICD-10-CM

## 2024-08-19 RX ORDER — MELOXICAM 15 MG/1
15 TABLET ORAL DAILY PRN
Qty: 30 TABLET | Refills: 0 | Status: SHIPPED | OUTPATIENT
Start: 2024-08-19

## 2024-09-17 DIAGNOSIS — M94.262 CHONDROMALACIA OF LEFT KNEE: ICD-10-CM

## 2024-09-17 DIAGNOSIS — S83.272A COMPLEX TEAR OF LATERAL MENISCUS OF LEFT KNEE AS CURRENT INJURY, INITIAL ENCOUNTER: ICD-10-CM

## 2024-09-17 RX ORDER — MELOXICAM 15 MG/1
15 TABLET ORAL DAILY PRN
Qty: 30 TABLET | Refills: 0 | OUTPATIENT
Start: 2024-09-17

## 2024-09-29 DIAGNOSIS — R09.81 NASAL CONGESTION: ICD-10-CM

## 2024-09-30 RX ORDER — FLUTICASONE PROPIONATE 50 MCG
2 SPRAY, SUSPENSION (ML) NASAL DAILY
Qty: 3 EACH | Refills: 1 | Status: SHIPPED | OUTPATIENT
Start: 2024-09-30

## 2024-09-30 NOTE — TELEPHONE ENCOUNTER
Refill Request     CONFIRM preferred pharmacy with the patient.    If Mail Order Rx - Pend for 90 day refill.      Last Seen: Last Seen Department: 5/30/2024  Last Seen by PCP: 5/30/2024    Last Written: 7/19/24 1 each 1 refills    If no future appointment scheduled:  Review the last OV with PCP and review information for follow-up visit,  Route STAFF MESSAGE with patient name to the  Pool for scheduling with the following information:            -  Timing of next visit           -  Visit type ie Physical, OV, etc           -  Diagnoses/Reason ie. COPD, HTN - Do not use MEDICATION, Follow-up or CHECK UP - Give reason for visit      Next Appointment:   Future Appointments   Date Time Provider Department Center   10/15/2024  5:00 PM Benito Noel MD [facility]   3/19/2025 12:00 PM Spencer Padilla DO [facility]       Message sent to  to schedule appt with patient?  NO      Requested Prescriptions     Pending Prescriptions Disp Refills    fluticasone (FLONASE) 50 MCG/ACT nasal spray [Pharmacy Med Name: FLUTICASONE PROP 50 MCG SPRAY]  1     Sig: SPRAY 2 SPRAYS INTO EACH NOSTRIL EVERY DAY

## 2024-10-15 ENCOUNTER — TELEMEDICINE (OUTPATIENT)
Dept: BARIATRICS/WEIGHT MGMT | Age: 34
End: 2024-10-15

## 2024-10-15 ENCOUNTER — PATIENT MESSAGE (OUTPATIENT)
Dept: FAMILY MEDICINE CLINIC | Age: 34
End: 2024-10-15

## 2024-10-15 ENCOUNTER — TELEPHONE (OUTPATIENT)
Dept: FAMILY MEDICINE CLINIC | Age: 34
End: 2024-10-15

## 2024-10-15 DIAGNOSIS — Z71.3 DIETARY COUNSELING AND SURVEILLANCE: ICD-10-CM

## 2024-10-15 DIAGNOSIS — Z98.84 S/P LAPAROSCOPIC SLEEVE GASTRECTOMY: ICD-10-CM

## 2024-10-15 DIAGNOSIS — E66.01 MORBID OBESITY WITH BMI OF 45.0-49.9, ADULT: Primary | ICD-10-CM

## 2024-10-15 NOTE — TELEPHONE ENCOUNTER
[facility] had called in to the office to transfer a patient to nurse line.  ECC lost the call and the nurse attempted to call the patient back.  Nurse called the patient, no answer nurse left a message for the patient to return the call to the office.

## 2024-10-15 NOTE — PROGRESS NOTES
Patient: Laney Pineda     Encounter Date: 10/15/2024    YOB: 1990               Age: 34 y.o.        Patient identification was verified at the start of the visit.         10/15/2024     2:33 PM   Patient-Reported Vitals   Patient-Reported Weight 310   Patient-Reported Height 5’7”         BP Readings from Last 1 Encounters:   06/19/24 127/85       BMI Readings from Last 1 Encounters:   08/01/24 46.52 kg/m²       Pulse Readings from Last 1 Encounters:   06/19/24 68                                             Wt Readings from Last 3 Encounters:   08/01/24 134.7 kg (297 lb)   06/19/24 134.7 kg (297 lb)   05/02/24 117.9 kg (260 lb)        Chief Complaint   Patient presents with    Bariatric, Initial Visit     MWM--NP        HPI:    34 y.o. female presents to establish care via video visit. The patient's medical history is significant for class III obesity s/p sleeve gastrectomy by Dr. Spencer Padilla. The patient has a long-standing history of obesity which started gradually. The problem is severe.  The patient has been gaining weight.  Risk factors include annual weight gain of >2 lbs (1 kg)/ year and sedentary lifestyle. Aggravating factors include poor diet and lack of physical activity. The patient has tried various diet/exercise plans which have been ineffective in the long-run. she is motivated to start losing weight to help improve her overall health.       Initial presurgical weight: 306 pounds   Lowest weight s/p sleeve: 220 pounds    When did you become overweight?  [] Childhood   [] Teens   [x] Adulthood   [] Pregnancy   [] Menopause    Highest adult weight: Current     Triggers for weight gain?   [] Stress   [] Illness   [] Medications   [] Travel  [x]Injury- knee injury with surgery in December      [] Nightshift work   [] Insomnia  [] No specific triggers   [] Other    Food triggers:   [x] Stress   [x] Boredom   [] Fast food   [] Eating out   [] Seeking reward   [] Social     Have you ever

## 2024-10-15 NOTE — TELEPHONE ENCOUNTER
Left message for the patient to call the office back regarding visit with Ashley DANG.   Mychart message sent as well.

## 2024-10-16 ENCOUNTER — TELEPHONE (OUTPATIENT)
Dept: BARIATRICS/WEIGHT MGMT | Age: 34
End: 2024-10-16

## 2024-10-16 NOTE — TELEPHONE ENCOUNTER
Spoke with patient. Advised to contact the office to schedule a follow-up appointment with Dr. Noel once fasting labs have been completed. Patient verbalized understanding.

## 2024-10-17 SDOH — ECONOMIC STABILITY: FOOD INSECURITY: WITHIN THE PAST 12 MONTHS, YOU WORRIED THAT YOUR FOOD WOULD RUN OUT BEFORE YOU GOT MONEY TO BUY MORE.: OFTEN TRUE

## 2024-10-17 SDOH — ECONOMIC STABILITY: FOOD INSECURITY: WITHIN THE PAST 12 MONTHS, THE FOOD YOU BOUGHT JUST DIDN'T LAST AND YOU DIDN'T HAVE MONEY TO GET MORE.: OFTEN TRUE

## 2024-10-17 SDOH — ECONOMIC STABILITY: INCOME INSECURITY: HOW HARD IS IT FOR YOU TO PAY FOR THE VERY BASICS LIKE FOOD, HOUSING, MEDICAL CARE, AND HEATING?: VERY HARD

## 2024-10-17 SDOH — ECONOMIC STABILITY: TRANSPORTATION INSECURITY
IN THE PAST 12 MONTHS, HAS LACK OF TRANSPORTATION KEPT YOU FROM MEETINGS, WORK, OR FROM GETTING THINGS NEEDED FOR DAILY LIVING?: YES

## 2024-10-18 ENCOUNTER — HOSPITAL ENCOUNTER (OUTPATIENT)
Dept: CT IMAGING | Age: 34
Discharge: HOME | End: 2024-10-18
Payer: COMMERCIAL

## 2024-10-18 ENCOUNTER — OFFICE VISIT (OUTPATIENT)
Dept: FAMILY MEDICINE CLINIC | Age: 34
End: 2024-10-18
Payer: COMMERCIAL

## 2024-10-18 VITALS
SYSTOLIC BLOOD PRESSURE: 110 MMHG | HEIGHT: 67 IN | HEART RATE: 72 BPM | TEMPERATURE: 97.1 F | BODY MASS INDEX: 45.99 KG/M2 | WEIGHT: 293 LBS | DIASTOLIC BLOOD PRESSURE: 74 MMHG | OXYGEN SATURATION: 98 %

## 2024-10-18 DIAGNOSIS — R10.84 GENERALIZED ABDOMINAL PAIN: ICD-10-CM

## 2024-10-18 DIAGNOSIS — K92.1: ICD-10-CM

## 2024-10-18 DIAGNOSIS — R10.84 GENERALIZED ABDOMINAL PAIN: Primary | ICD-10-CM

## 2024-10-18 PROCEDURE — G8484 FLU IMMUNIZE NO ADMIN: HCPCS | Performed by: PHYSICIAN ASSISTANT

## 2024-10-18 PROCEDURE — G8427 DOCREV CUR MEDS BY ELIG CLIN: HCPCS | Performed by: PHYSICIAN ASSISTANT

## 2024-10-18 PROCEDURE — 6360000004 HC RX CONTRAST MEDICATION: Performed by: PHYSICIAN ASSISTANT

## 2024-10-18 PROCEDURE — 99213 OFFICE O/P EST LOW 20 MIN: CPT | Performed by: PHYSICIAN ASSISTANT

## 2024-10-18 PROCEDURE — 74177 CT ABD & PELVIS W/CONTRAST: CPT

## 2024-10-18 PROCEDURE — 1036F TOBACCO NON-USER: CPT | Performed by: PHYSICIAN ASSISTANT

## 2024-10-18 PROCEDURE — G8417 CALC BMI ABV UP PARAM F/U: HCPCS | Performed by: PHYSICIAN ASSISTANT

## 2024-10-18 RX ORDER — DIATRIZOATE MEGLUMINE AND DIATRIZOATE SODIUM 660; 100 MG/ML; MG/ML
12 SOLUTION ORAL; RECTAL
Status: COMPLETED | OUTPATIENT
Start: 2024-10-18 | End: 2024-10-18

## 2024-10-18 RX ORDER — IOPAMIDOL 755 MG/ML
75 INJECTION, SOLUTION INTRAVASCULAR
Status: COMPLETED | OUTPATIENT
Start: 2024-10-18 | End: 2024-10-18

## 2024-10-18 RX ADMIN — IOPAMIDOL 75 ML: 755 INJECTION, SOLUTION INTRAVENOUS at 16:12

## 2024-10-18 RX ADMIN — DIATRIZOATE MEGLUMINE AND DIATRIZOATE SODIUM 12 ML: 660; 100 LIQUID ORAL; RECTAL at 16:12

## 2024-10-18 ASSESSMENT — ANXIETY QUESTIONNAIRES
7. FEELING AFRAID AS IF SOMETHING AWFUL MIGHT HAPPEN: SEVERAL DAYS
3. WORRYING TOO MUCH ABOUT DIFFERENT THINGS: NEARLY EVERY DAY
1. FEELING NERVOUS, ANXIOUS, OR ON EDGE: MORE THAN HALF THE DAYS
4. TROUBLE RELAXING: SEVERAL DAYS
5. BEING SO RESTLESS THAT IT IS HARD TO SIT STILL: SEVERAL DAYS
2. NOT BEING ABLE TO STOP OR CONTROL WORRYING: SEVERAL DAYS
6. BECOMING EASILY ANNOYED OR IRRITABLE: MORE THAN HALF THE DAYS
GAD7 TOTAL SCORE: 11
IF YOU CHECKED OFF ANY PROBLEMS ON THIS QUESTIONNAIRE, HOW DIFFICULT HAVE THESE PROBLEMS MADE IT FOR YOU TO DO YOUR WORK, TAKE CARE OF THINGS AT HOME, OR GET ALONG WITH OTHER PEOPLE: VERY DIFFICULT

## 2024-10-18 ASSESSMENT — PATIENT HEALTH QUESTIONNAIRE - PHQ9
1. LITTLE INTEREST OR PLEASURE IN DOING THINGS: MORE THAN HALF THE DAYS
10. IF YOU CHECKED OFF ANY PROBLEMS, HOW DIFFICULT HAVE THESE PROBLEMS MADE IT FOR YOU TO DO YOUR WORK, TAKE CARE OF THINGS AT HOME, OR GET ALONG WITH OTHER PEOPLE: NOT DIFFICULT AT ALL
4. FEELING TIRED OR HAVING LITTLE ENERGY: MORE THAN HALF THE DAYS
8. MOVING OR SPEAKING SO SLOWLY THAT OTHER PEOPLE COULD HAVE NOTICED. OR THE OPPOSITE, BEING SO FIGETY OR RESTLESS THAT YOU HAVE BEEN MOVING AROUND A LOT MORE THAN USUAL: NOT AT ALL
2. FEELING DOWN, DEPRESSED OR HOPELESS: SEVERAL DAYS
3. TROUBLE FALLING OR STAYING ASLEEP: NEARLY EVERY DAY
9. THOUGHTS THAT YOU WOULD BE BETTER OFF DEAD, OR OF HURTING YOURSELF: NOT AT ALL
SUM OF ALL RESPONSES TO PHQ9 QUESTIONS 1 & 2: 3
SUM OF ALL RESPONSES TO PHQ QUESTIONS 1-9: 14
6. FEELING BAD ABOUT YOURSELF - OR THAT YOU ARE A FAILURE OR HAVE LET YOURSELF OR YOUR FAMILY DOWN: MORE THAN HALF THE DAYS
SUM OF ALL RESPONSES TO PHQ QUESTIONS 1-9: 14
5. POOR APPETITE OR OVEREATING: MORE THAN HALF THE DAYS
SUM OF ALL RESPONSES TO PHQ QUESTIONS 1-9: 14
SUM OF ALL RESPONSES TO PHQ QUESTIONS 1-9: 14
7. TROUBLE CONCENTRATING ON THINGS, SUCH AS READING THE NEWSPAPER OR WATCHING TELEVISION: MORE THAN HALF THE DAYS

## 2024-10-18 NOTE — PROGRESS NOTES
Laney Pineda (:  1990) is a 34 y.o. female,Established patient, here for evaluation of the following chief complaint(s):  gi issue  (Blood in both stool and toilet/X 2-3 days /Felt swollen in her intestine /) and Knee Pain (Left knee pain/Needs accomodation sheet filled out for job. )         Assessment & Plan  Generalized abdominal pain    Negative CT    Orders:    CT ABDOMEN PELVIS W IV CONTRAST Additional Contrast? Oral; Future    Blood in stool, gustavo    Negative CT, referral to GI was placed for follow up    Orders:    CT ABDOMEN PELVIS W IV CONTRAST Additional Contrast? Oral; Future      Return if symptoms worsen or fail to improve.       Subjective   HPI  Blood in stool:  Timing: five days ago  Associated symptoms: blood in stool and water, abdominal bloating, pain on right lower abdomen (has improved), diarrhea  Denies: black stool, rectal pain, fever, weight loss  Family hx of IBD or colon cancer: none    Review of Systems   Constitutional:  Negative for chills, diaphoresis, fever and unexpected weight change.   Gastrointestinal:  Positive for abdominal distention, abdominal pain, blood in stool, diarrhea and nausea. Negative for anal bleeding, constipation, rectal pain and vomiting.          Objective   Physical Exam  Vitals reviewed.   Constitutional:       Appearance: Normal appearance.   HENT:      Head: Normocephalic and atraumatic.   Cardiovascular:      Rate and Rhythm: Normal rate and regular rhythm.      Heart sounds: Normal heart sounds.   Pulmonary:      Effort: Pulmonary effort is normal.      Breath sounds: Normal breath sounds.   Abdominal:      General: Abdomen is flat.      Palpations: Abdomen is soft.      Tenderness: There is abdominal tenderness. There is rebound. There is no right CVA tenderness, left CVA tenderness or guarding.   Genitourinary:     Comments: Pt declined exam of rectum  Neurological:      Mental Status: She is alert.                  An electronic

## 2024-10-21 DIAGNOSIS — K92.1: Primary | ICD-10-CM

## 2024-10-24 DIAGNOSIS — M94.262 CHONDROMALACIA OF LEFT KNEE: ICD-10-CM

## 2024-10-24 DIAGNOSIS — S83.272A COMPLEX TEAR OF LATERAL MENISCUS OF LEFT KNEE AS CURRENT INJURY, INITIAL ENCOUNTER: ICD-10-CM

## 2024-10-24 RX ORDER — MELOXICAM 15 MG/1
15 TABLET ORAL DAILY PRN
Qty: 30 TABLET | Refills: 0 | OUTPATIENT
Start: 2024-10-24

## 2024-10-28 DIAGNOSIS — S83.272A COMPLEX TEAR OF LATERAL MENISCUS OF LEFT KNEE AS CURRENT INJURY, INITIAL ENCOUNTER: ICD-10-CM

## 2024-10-28 DIAGNOSIS — M94.262 CHONDROMALACIA OF LEFT KNEE: ICD-10-CM

## 2024-10-29 DIAGNOSIS — S83.272A COMPLEX TEAR OF LATERAL MENISCUS OF LEFT KNEE AS CURRENT INJURY, INITIAL ENCOUNTER: ICD-10-CM

## 2024-10-29 DIAGNOSIS — M94.262 CHONDROMALACIA OF LEFT KNEE: ICD-10-CM

## 2024-10-29 RX ORDER — MELOXICAM 15 MG/1
15 TABLET ORAL DAILY PRN
Qty: 30 TABLET | Refills: 0 | OUTPATIENT
Start: 2024-10-29

## 2024-10-30 DIAGNOSIS — E66.01 MORBID OBESITY WITH BMI OF 45.0-49.9, ADULT: ICD-10-CM

## 2024-10-30 DIAGNOSIS — Z98.84 S/P LAPAROSCOPIC SLEEVE GASTRECTOMY: ICD-10-CM

## 2024-10-30 LAB
25(OH)D3 SERPL-MCNC: 24.9 NG/ML
ALBUMIN SERPL-MCNC: 4.2 G/DL (ref 3.4–5)
ALBUMIN/GLOB SERPL: 1.7 {RATIO} (ref 1.1–2.2)
ALP SERPL-CCNC: 64 U/L (ref 40–129)
ALT SERPL-CCNC: 18 U/L (ref 10–40)
ANION GAP SERPL CALCULATED.3IONS-SCNC: 11 MMOL/L (ref 3–16)
AST SERPL-CCNC: 21 U/L (ref 15–37)
BILIRUB SERPL-MCNC: 1.5 MG/DL (ref 0–1)
BUN SERPL-MCNC: 12 MG/DL (ref 7–20)
CALCIUM SERPL-MCNC: 9.4 MG/DL (ref 8.3–10.6)
CHLORIDE SERPL-SCNC: 102 MMOL/L (ref 99–110)
CHOLEST SERPL-MCNC: 172 MG/DL (ref 0–199)
CO2 SERPL-SCNC: 25 MMOL/L (ref 21–32)
CREAT SERPL-MCNC: 0.6 MG/DL (ref 0.6–1.1)
DEPRECATED RDW RBC AUTO: 13.2 % (ref 12.4–15.4)
FOLATE SERPL-MCNC: 19.6 NG/ML (ref 4.78–24.2)
GFR SERPLBLD CREATININE-BSD FMLA CKD-EPI: >90 ML/MIN/{1.73_M2}
GLUCOSE SERPL-MCNC: 91 MG/DL (ref 70–99)
HCT VFR BLD AUTO: 39.8 % (ref 36–48)
HDLC SERPL-MCNC: 51 MG/DL (ref 40–60)
HGB BLD-MCNC: 13.7 G/DL (ref 12–16)
LDLC SERPL CALC-MCNC: 100 MG/DL
MCH RBC QN AUTO: 29.6 PG (ref 26–34)
MCHC RBC AUTO-ENTMCNC: 34.5 G/DL (ref 31–36)
MCV RBC AUTO: 85.8 FL (ref 80–100)
PLATELET # BLD AUTO: 309 K/UL (ref 135–450)
PMV BLD AUTO: 8.2 FL (ref 5–10.5)
POTASSIUM SERPL-SCNC: 4.3 MMOL/L (ref 3.5–5.1)
PROT SERPL-MCNC: 6.7 G/DL (ref 6.4–8.2)
RBC # BLD AUTO: 4.64 M/UL (ref 4–5.2)
SODIUM SERPL-SCNC: 138 MMOL/L (ref 136–145)
TRIGL SERPL-MCNC: 103 MG/DL (ref 0–150)
TSH SERPL DL<=0.005 MIU/L-ACNC: 1.38 UIU/ML (ref 0.27–4.2)
VIT B12 SERPL-MCNC: 531 PG/ML (ref 211–911)
VLDLC SERPL CALC-MCNC: 21 MG/DL
WBC # BLD AUTO: 7.1 K/UL (ref 4–11)

## 2024-10-31 LAB
EST. AVERAGE GLUCOSE BLD GHB EST-MCNC: 111.2 MG/DL
HBA1C MFR BLD: 5.5 %

## 2024-11-15 DIAGNOSIS — K44.9 HIATAL HERNIA WITH GERD: ICD-10-CM

## 2024-11-15 DIAGNOSIS — K21.9 HIATAL HERNIA WITH GERD: ICD-10-CM

## 2024-11-15 RX ORDER — DICYCLOMINE HCL 20 MG
20 TABLET ORAL 4 TIMES DAILY
Qty: 360 TABLET | Refills: 1 | Status: SHIPPED | OUTPATIENT
Start: 2024-11-15

## 2024-11-15 NOTE — TELEPHONE ENCOUNTER
Refill Request     CONFIRM preferred pharmacy with the patient.    If Mail Order Rx - Pend for 90 day refill.      Last Seen: Last Seen Department: 10/18/2024  Last Seen by PCP: 10/18/2024    Last Written: 4/18/24 #360 with 1 refill     If no future appointment scheduled:  Review the last OV with PCP and review information for follow-up visit,  Route STAFF MESSAGE with patient name to the  Pool for scheduling with the following information:            -  Timing of next visit           -  Visit type ie Physical, OV, etc           -  Diagnoses/Reason ie. COPD, HTN - Do not use MEDICATION, Follow-up or CHECK UP - Give reason for visit      Next Appointment:   Future Appointments   Date Time Provider Department Center   3/19/2025 12:00 PM Spencer Padilla DO [facility]       Message sent to  to schedule appt with patient?  NO-when is the patient due back to be seen?      Requested Prescriptions     Pending Prescriptions Disp Refills    dicyclomine (BENTYL) 20 MG tablet [Pharmacy Med Name: DICYCLOMINE 20 MG TABLET] 360 tablet 1     Sig: TAKE 1 TABLET BY MOUTH FOUR TIMES A DAY

## 2024-11-21 ENCOUNTER — TELEMEDICINE (OUTPATIENT)
Dept: BARIATRICS/WEIGHT MGMT | Age: 34
End: 2024-11-21
Payer: COMMERCIAL

## 2024-11-21 DIAGNOSIS — Z71.3 DIETARY COUNSELING AND SURVEILLANCE: ICD-10-CM

## 2024-11-21 DIAGNOSIS — E55.9 VITAMIN D INSUFFICIENCY: ICD-10-CM

## 2024-11-21 DIAGNOSIS — E66.01 MORBID OBESITY WITH BMI OF 45.0-49.9, ADULT: Primary | ICD-10-CM

## 2024-11-21 PROCEDURE — 99214 OFFICE O/P EST MOD 30 MIN: CPT | Performed by: FAMILY MEDICINE

## 2024-11-21 PROCEDURE — G8427 DOCREV CUR MEDS BY ELIG CLIN: HCPCS | Performed by: FAMILY MEDICINE

## 2024-11-21 PROCEDURE — G2211 COMPLEX E/M VISIT ADD ON: HCPCS | Performed by: FAMILY MEDICINE

## 2024-11-21 RX ORDER — SEMAGLUTIDE 0.25 MG/.5ML
0.25 INJECTION, SOLUTION SUBCUTANEOUS
Qty: 2 ML | Refills: 0 | Status: SHIPPED | OUTPATIENT
Start: 2024-11-21

## 2024-11-21 ASSESSMENT — ENCOUNTER SYMPTOMS
BLOOD IN STOOL: 0
CHOKING: 0
SHORTNESS OF BREATH: 0
CONSTIPATION: 0
VOMITING: 0
DIARRHEA: 0
PHOTOPHOBIA: 0
WHEEZING: 0
EYE PAIN: 0
ABDOMINAL DISTENTION: 0
ABDOMINAL PAIN: 0
COUGH: 0
CHEST TIGHTNESS: 0
APNEA: 0
NAUSEA: 0

## 2024-11-21 NOTE — PROGRESS NOTES
Patient: Laney Pineda                      Encounter Date: 11/21/2024    YOB: 1990                Age: 34 y.o.    Chief Complaint   Patient presents with    Weight Management     F/u MWM          Patient identification was verified at the start of the visit.         11/20/2024     7:16 PM   Patient-Reported Vitals   Patient-Reported Weight 305   Patient-Reported Height 5’7”         BP Readings from Last 1 Encounters:   10/18/24 110/74       BMI Readings from Last 1 Encounters:   10/18/24 48.87 kg/m²       Pulse Readings from Last 1 Encounters:   10/18/24 72          Wt Readings from Last 3 Encounters:   10/18/24 (!) 141.5 kg (312 lb)   08/01/24 134.7 kg (297 lb)   06/19/24 134.7 kg (297 lb)        HPI: 34 y.o. female with a long-standing history of obesity s/p sleeve gastrectomy   presents today for virtual video follow-up. She has lost 5 pounds since her last visit. Current treatment includes low carb/gauri diet. Interested in aom to help control appetite.     Initial presurgical weight: 306 pounds   Lowest weight s/p sleeve: 220 pounds    Labs reviewed with patient     No Known Allergies      Current Outpatient Medications:     Semaglutide-Weight Management (WEGOVY) 0.25 MG/0.5ML SOAJ SC injection, Inject 0.25 mg into the skin every 7 days, Disp: 2 mL, Rfl: 0    dicyclomine (BENTYL) 20 MG tablet, TAKE 1 TABLET BY MOUTH FOUR TIMES A DAY, Disp: 360 tablet, Rfl: 1    fluticasone (FLONASE) 50 MCG/ACT nasal spray, SPRAY 2 SPRAYS INTO EACH NOSTRIL EVERY DAY, Disp: 3 each, Rfl: 1    meloxicam (MOBIC) 15 MG tablet, Take 1 tablet by mouth daily as needed for Pain, Disp: 30 tablet, Rfl: 0    busPIRone (BUSPAR) 10 MG tablet, TAKE 1 TABLET BY MOUTH TWICE A DAY, Disp: 180 tablet, Rfl: 1    citalopram (CELEXA) 40 MG tablet, Take 1 tablet by mouth daily, Disp: 90 tablet, Rfl: 1    hydrOXYzine HCl (ATARAX) 25 MG tablet, TAKE 1 TABLET BY MOUTH EVERY DAY AT NIGHT, Disp: 90 tablet, Rfl: 1    acetaminophen

## 2024-11-22 ENCOUNTER — TELEPHONE (OUTPATIENT)
Dept: BARIATRICS/WEIGHT MGMT | Age: 34
End: 2024-11-22

## 2024-11-22 NOTE — TELEPHONE ENCOUNTER
Per Dr. Noel; patient advised to contact the office to schedule a 4 week follow-up appointment once Wegovy medication has been picked up

## 2024-12-07 DIAGNOSIS — F33.1 MODERATE EPISODE OF RECURRENT MAJOR DEPRESSIVE DISORDER (HCC): ICD-10-CM

## 2024-12-07 NOTE — TELEPHONE ENCOUNTER
.Refill Request     CONFIRM preferred pharmacy with the patient.    If Mail Order Rx - Pend for 90 day refill.      Last Seen: Last Seen Department: 10/18/2024  Last Seen by PCP: 10/18/2024    Last Written: 5/30/24 90 with 1     If no future appointment scheduled:  Review the last OV with PCP and review information for follow-up visit,  Route STAFF MESSAGE with patient name to the  Pool for scheduling with the following information:            -  Timing of next visit           -  Visit type ie Physical, OV, etc           -  Diagnoses/Reason ie. COPD, HTN - Do not use MEDICATION, Follow-up or CHECK UP - Give reason for visit      Next Appointment:   Future Appointments   Date Time Provider Department Center   3/19/2025 12:00 PM Spencer Padilla DO [facility]       Message sent to  to schedule appt with patient?  NO      Requested Prescriptions     Pending Prescriptions Disp Refills    citalopram (CELEXA) 40 MG tablet [Pharmacy Med Name: CITALOPRAM HBR 40 MG TABLET] 90 tablet 1     Sig: TAKE 1 TABLET BY MOUTH EVERY DAY

## 2024-12-09 RX ORDER — CITALOPRAM HYDROBROMIDE 40 MG/1
40 TABLET ORAL DAILY
Qty: 90 TABLET | Refills: 1 | Status: SHIPPED | OUTPATIENT
Start: 2024-12-09

## 2024-12-27 ENCOUNTER — ANESTHESIA EVENT (OUTPATIENT)
Dept: ENDOSCOPY | Age: 34
End: 2024-12-27
Payer: MEDICAID

## 2025-01-17 ENCOUNTER — HOSPITAL ENCOUNTER (OUTPATIENT)
Age: 35
Setting detail: OUTPATIENT SURGERY
Discharge: HOME | End: 2025-01-17
Attending: INTERNAL MEDICINE | Admitting: INTERNAL MEDICINE
Payer: MEDICAID

## 2025-01-17 ENCOUNTER — ANESTHESIA (OUTPATIENT)
Dept: ENDOSCOPY | Age: 35
End: 2025-01-17
Payer: MEDICAID

## 2025-01-17 VITALS
OXYGEN SATURATION: 98 % | SYSTOLIC BLOOD PRESSURE: 108 MMHG | TEMPERATURE: 98.2 F | RESPIRATION RATE: 16 BRPM | WEIGHT: 293 LBS | HEIGHT: 67 IN | DIASTOLIC BLOOD PRESSURE: 67 MMHG | BODY MASS INDEX: 45.99 KG/M2 | HEART RATE: 63 BPM

## 2025-01-17 DIAGNOSIS — R14.0 ABDOMINAL BLOATING: ICD-10-CM

## 2025-01-17 DIAGNOSIS — K62.5 RECTAL BLEEDING: ICD-10-CM

## 2025-01-17 LAB
GLUCOSE BLD-MCNC: 101 MG/DL (ref 70–99)
HCG UR QL: NEGATIVE
PERFORMED ON: ABNORMAL

## 2025-01-17 PROCEDURE — 3700000001 HC ADD 15 MINUTES (ANESTHESIA): Performed by: INTERNAL MEDICINE

## 2025-01-17 PROCEDURE — 3609010300 HC COLONOSCOPY W/BIOPSY SINGLE/MULTIPLE: Performed by: INTERNAL MEDICINE

## 2025-01-17 PROCEDURE — 2500000003 HC RX 250 WO HCPCS: Performed by: ANESTHESIOLOGY

## 2025-01-17 PROCEDURE — 3700000000 HC ANESTHESIA ATTENDED CARE: Performed by: INTERNAL MEDICINE

## 2025-01-17 PROCEDURE — 7100000011 HC PHASE II RECOVERY - ADDTL 15 MIN: Performed by: INTERNAL MEDICINE

## 2025-01-17 PROCEDURE — 6360000002 HC RX W HCPCS: Performed by: NURSE ANESTHETIST, CERTIFIED REGISTERED

## 2025-01-17 PROCEDURE — 88305 TISSUE EXAM BY PATHOLOGIST: CPT

## 2025-01-17 PROCEDURE — 3609012400 HC EGD TRANSORAL BIOPSY SINGLE/MULTIPLE: Performed by: INTERNAL MEDICINE

## 2025-01-17 PROCEDURE — 7100000010 HC PHASE II RECOVERY - FIRST 15 MIN: Performed by: INTERNAL MEDICINE

## 2025-01-17 PROCEDURE — 2709999900 HC NON-CHARGEABLE SUPPLY: Performed by: INTERNAL MEDICINE

## 2025-01-17 PROCEDURE — 84703 CHORIONIC GONADOTROPIN ASSAY: CPT

## 2025-01-17 RX ORDER — MEPERIDINE HYDROCHLORIDE 25 MG/ML
12.5 INJECTION INTRAMUSCULAR; INTRAVENOUS; SUBCUTANEOUS EVERY 5 MIN PRN
Status: DISCONTINUED | OUTPATIENT
Start: 2025-01-17 | End: 2025-01-17 | Stop reason: HOSPADM

## 2025-01-17 RX ORDER — SODIUM CHLORIDE 0.9 % (FLUSH) 0.9 %
5-40 SYRINGE (ML) INJECTION EVERY 12 HOURS SCHEDULED
Status: DISCONTINUED | OUTPATIENT
Start: 2025-01-17 | End: 2025-01-17 | Stop reason: HOSPADM

## 2025-01-17 RX ORDER — SODIUM CHLORIDE 0.9 % (FLUSH) 0.9 %
5-40 SYRINGE (ML) INJECTION PRN
Status: DISCONTINUED | OUTPATIENT
Start: 2025-01-17 | End: 2025-01-17 | Stop reason: HOSPADM

## 2025-01-17 RX ORDER — SODIUM CHLORIDE 9 MG/ML
INJECTION, SOLUTION INTRAVENOUS PRN
Status: DISCONTINUED | OUTPATIENT
Start: 2025-01-17 | End: 2025-01-17 | Stop reason: HOSPADM

## 2025-01-17 RX ORDER — LIDOCAINE HYDROCHLORIDE 20 MG/ML
INJECTION, SOLUTION INFILTRATION; PERINEURAL
Status: DISCONTINUED | OUTPATIENT
Start: 2025-01-17 | End: 2025-01-17 | Stop reason: SDUPTHER

## 2025-01-17 RX ORDER — ONDANSETRON 2 MG/ML
4 INJECTION INTRAMUSCULAR; INTRAVENOUS
Status: DISCONTINUED | OUTPATIENT
Start: 2025-01-17 | End: 2025-01-17 | Stop reason: HOSPADM

## 2025-01-17 RX ORDER — NALOXONE HYDROCHLORIDE 0.4 MG/ML
INJECTION, SOLUTION INTRAMUSCULAR; INTRAVENOUS; SUBCUTANEOUS PRN
Status: DISCONTINUED | OUTPATIENT
Start: 2025-01-17 | End: 2025-01-17 | Stop reason: HOSPADM

## 2025-01-17 RX ORDER — SODIUM CHLORIDE, SODIUM LACTATE, POTASSIUM CHLORIDE, CALCIUM CHLORIDE 600; 310; 30; 20 MG/100ML; MG/100ML; MG/100ML; MG/100ML
INJECTION, SOLUTION INTRAVENOUS CONTINUOUS
Status: DISCONTINUED | OUTPATIENT
Start: 2025-01-17 | End: 2025-01-17 | Stop reason: HOSPADM

## 2025-01-17 RX ORDER — OXYCODONE HYDROCHLORIDE 5 MG/1
5 TABLET ORAL PRN
Status: DISCONTINUED | OUTPATIENT
Start: 2025-01-17 | End: 2025-01-17 | Stop reason: HOSPADM

## 2025-01-17 RX ORDER — OXYCODONE HYDROCHLORIDE 5 MG/1
10 TABLET ORAL PRN
Status: DISCONTINUED | OUTPATIENT
Start: 2025-01-17 | End: 2025-01-17 | Stop reason: HOSPADM

## 2025-01-17 RX ORDER — PROPOFOL 10 MG/ML
INJECTION, EMULSION INTRAVENOUS
Status: DISCONTINUED | OUTPATIENT
Start: 2025-01-17 | End: 2025-01-17 | Stop reason: SDUPTHER

## 2025-01-17 RX ADMIN — PROPOFOL 150 MG: 10 INJECTION, EMULSION INTRAVENOUS at 08:58

## 2025-01-17 RX ADMIN — LIDOCAINE HYDROCHLORIDE 100 MG: 20 INJECTION, SOLUTION INFILTRATION; PERINEURAL at 08:52

## 2025-01-17 RX ADMIN — PROPOFOL 150 MG: 10 INJECTION, EMULSION INTRAVENOUS at 09:03

## 2025-01-17 RX ADMIN — PROPOFOL 150 MG: 10 INJECTION, EMULSION INTRAVENOUS at 08:52

## 2025-01-17 RX ADMIN — FAMOTIDINE 20 MG: 10 INJECTION INTRAVENOUS at 08:16

## 2025-01-17 ASSESSMENT — PAIN SCALES - GENERAL: PAINLEVEL_OUTOF10: 0

## 2025-01-17 ASSESSMENT — ENCOUNTER SYMPTOMS: SHORTNESS OF BREATH: 0

## 2025-01-17 ASSESSMENT — PAIN - FUNCTIONAL ASSESSMENT: PAIN_FUNCTIONAL_ASSESSMENT: 0-10

## 2025-01-17 ASSESSMENT — LIFESTYLE VARIABLES: SMOKING_STATUS: 0

## 2025-01-17 NOTE — ANESTHESIA POSTPROCEDURE EVALUATION
Department of Anesthesiology  Postprocedure Note    Patient: Laney Pineda  MRN: [id number]  YOB: 1990  Date of evaluation: 1/17/2025    Procedure Summary       Date: 01/17/25 Room / Location: [address]    Anesthesia Start: 0847 Anesthesia Stop: 0915    Procedures:       ESOPHAGOGASTRODUODENOSCOPY BIOPSY      COLONOSCOPY BIOPSY Diagnosis:       Rectal bleeding      Abdominal bloating      (Rectal bleeding [K62.5])      (Abdominal bloating [R14.0])    Surgeons: Timmy Webb MD Responsible Provider: Gonzalo Blood MD    Anesthesia Type: TIVA ASA Status: 3            Anesthesia Type: No value filed.    Olivia Phase I: Olivia Score: 10    Olivia Phase II: Olivia Score: 10    Anesthesia Post Evaluation    Patient location during evaluation: bedside  Patient participation: complete - patient participated  Level of consciousness: awake and alert  Airway patency: patent  Nausea & Vomiting: no nausea  Cardiovascular status: hemodynamically stable  Respiratory status: acceptable  Hydration status: euvolemic  Pain management: adequate      Vitals:    01/09/25 1525 01/17/25 0914 01/17/25 0938   BP:  (!) 102/56 108/67   Pulse:  73 63   Resp:  18 16   Temp:  98.1 °F (36.7 °C) 98.2 °F (36.8 °C)   TempSrc:  Infrared Infrared   SpO2:  95% 98%   Weight: (!) 137 kg (302 lb)     Height: 1.702 m (5' 7\")          No notable events documented.

## 2025-01-17 NOTE — DISCHARGE INSTRUCTIONS
PATIENT INSTRUCTIONS  POST-SEDATION    Laney Pineda          IMMEDIATELY FOLLOWING PROCEDURE:    Do not drive or operate machinery for the first twenty four hours after surgery.     Do not make any important decisions for twenty four hours after surgery or while taking narcotic pain medications or sedatives.     You should NOT BE LEFT UNATTENDED OR ALONE. A responsible adult should be with you for the rest of the day of your procedure and also during the night for your protection and safety.    You may experience some light headedness. Rest at home with activity as tolerated. You may not need to go to bed, but it is important to rest for the next 24 hours. You should not engage in athletic sports such as basketball, volleyball, jogging, skating, or activities requiring refined motor skills for 24 hours.   If you develop intractable nausea and vomiting or a severe headache please notify your doctor immediately.   You are not expected to have any fever, but if you feel warm, take your temperature. If you have a fever 101 degrees or higher, call your doctor.     If you have had an Endoscopy:   *Eat lightly for your first meal and gradually resume your normal / prescribed diet. DO NOT eat or drink until your gag reflex returns.   *If you have a sore throat you may use lozenges, or salt water gargles.   *If you have had a colonoscopy, do not expect a normal bowel movement for approximately three days due to the cleansing of the large intestine prior to colonoscopy.    ONCE YOU ARE HOME, IF YOU SHOULD HAVE:  Difficulty in breathing, persistent nausea or vomiting, bleeding you feel is excessive, or pain that is unusual, increased abdominal bloating, or any swelling, fever / chills, call your physician. If you cannot contact your physician, but feel that your signs and symptoms need a physician's attention, go to the Emergency Department.      FOLLOW-UP:    Please follow up with your primary care provider as  received specific instructions from your doctor).  \"           If you feel nauseated, continue with liquids until the nausea is gone.  \"           Notify your physician if you have not urinated within 8 hours after the procedure.  \"           Resume your medications unless otherwise instructed.     Contact your physician if you have any questions or concerns.     IF YOU REPORT TO AN EMERGENCY ROOM, DOCTOR'S OFFICE OR HOSPITAL WITHIN 24 HOURS AFTER YOUR PROCEDURE, BRING THIS SHEET AND YOUR AFTER VISIT SUMMARY WITH YOU AND GIVE IT TO THE PHYSICIAN OR NURSE ATTENDING YOU.

## 2025-01-17 NOTE — H&P
[facility]   Pre-operative History and Physical    Patient: Laney Pineda  : 1990  Acct#:     HISTORY OF PRESENT ILLNESS:    Indications: abdominal bloating, rectal bleeding    The patient is a 34-year-old female with medical history of obesity (BMI 48.8) status post sleeve gastrectomy 2020 referred for blood in stool. Patient reports red blood per rectum of 2 weeks duration. Associated with chronic diarrhea with 2-3 watery stools daily, post prandial abdominal bloating and abdominal cramping pains. Denies nausea, vomiting, fever, chills, constipation or melena. Denies chronic NSAIDs use.   Labs on 10/30/2027 noted unremarkable BMP, BUN 12, creatinine 0.6.  Normal liver test except elevated total bilirubin 1.5.  Normal CBC with hemoglobin 13.7, hematocrit 39.8, platelet 309, WBC 7.1.  CT abdomen and pelvis with IV contrast on 10/18/2024 noted no acute intra-abdominal pelvic process.  Small periumbilical fat-containing hernia.  Cholecystectomy and gastric suture material.  Last EGD 2020 Dr. Spencer Alexander: Normal esophagus. Abnormal gastric mucosa. normal duodenum.      Past Medical History:        Diagnosis Date    Acid reflux     Breast disorder     Breast tenderness     Broken tooth     COVID-19 2020    Depression     Migraine     Nipple discharge     Postpartum depression     Stress incontinence     Trichomoniasis       Past Surgical History:        Procedure Laterality Date    CHOLECYSTECTOMY      KNEE SURGERY Left 2024    VIDEO ARTHROSCOPY LEFT KNEE, ANTERIOR CRUCIATE LIGAMENT RECONSTRUCTION WITH BONE TENDON BONE AUTOGRAFT, LATERAL MENISCUS REPAIR, performed by Trav Sotelo MD at [facility] OR    SLEEVE GASTRECTOMY N/A 09/15/2020    ROBOTIC ASSISTED LAPAROSCOPIC VERTICAL SLEEVE GASTRECTOMY, HIATAL HERNIA REPAIR performed by Spencer Padilla DO at [facility] OR    UPPER GASTROINTESTINAL ENDOSCOPY N/A 2020    EGD BIOPSY performed by Spencer Padilla DO at [facility] ENDOSCOPY

## 2025-01-17 NOTE — ANESTHESIA PRE PROCEDURE
(If Applicable):  Lab Results   Component Value Date    ABORH A POS 09/15/2020    LABANTI NEG 09/15/2020       Drug/Infectious Status (If Applicable):  No results found for: \"HIV\", \"HEPCAB\"    COVID-19 Screening (If Applicable):   Lab Results   Component Value Date/Time    COVID19 Detected 02/21/2023 01:31 AM    COVID19 NOT DETECTED 02/05/2021 11:07 AM           Anesthesia Evaluation  Patient summary reviewed   no history of anesthetic complications:   Airway: Mallampati: II  TM distance: <3 FB   Neck ROM: full  Mouth opening: > = 3 FB   Dental:          Pulmonary:Negative Pulmonary ROS breath sounds clear to auscultation      (-) COPD, asthma, shortness of breath, sleep apnea and not a current smoker          Patient did not smoke on day of surgery.                 Cardiovascular:Negative CV ROS        (-) pacemaker, hypertension, past MI, CAD, CABG/stent, dysrhythmias,  angina and  CHF    ECG reviewed  Rhythm: regular  Rate: normal           Beta Blocker:  Not on Beta Blocker         Neuro/Psych:   (+) headaches: migraine headachesdepression/anxiety    (-) seizures, TIA, CVA and psychiatric history           GI/Hepatic/Renal:   (+) hiatal hernia, GERD: well controlled, bowel prep, morbid obesity     (-) liver disease and no renal disease       Endo/Other:    (+) : arthritis:., no malignancy/cancer.    (-) diabetes mellitus, hypothyroidism, hyperthyroidism, blood dyscrasia, no malignancy/cancer               Abdominal:   (+) obese    Abdomen: soft.      Vascular: negative vascular ROS.         Other Findings:             Anesthesia Plan      TIVA     ASA 3       Induction: intravenous.    MIPS: Prophylactic antiemetics administered.  Anesthetic plan and risks discussed with patient.      Plan discussed with CRNA.                This pre-anesthesia assessment may be used as a history and physical.    DOS STAFF ADDENDUM:    Pt seen and examined, chart reviewed (including anesthesia, drug and allergy history).  No

## 2025-01-30 ENCOUNTER — OFFICE VISIT (OUTPATIENT)
Dept: FAMILY MEDICINE CLINIC | Age: 35
End: 2025-01-30

## 2025-01-30 VITALS
BODY MASS INDEX: 49.18 KG/M2 | DIASTOLIC BLOOD PRESSURE: 82 MMHG | OXYGEN SATURATION: 97 % | WEIGHT: 293 LBS | HEART RATE: 76 BPM | SYSTOLIC BLOOD PRESSURE: 104 MMHG

## 2025-01-30 DIAGNOSIS — H10.9 BACTERIAL CONJUNCTIVITIS: Primary | ICD-10-CM

## 2025-01-30 DIAGNOSIS — H69.93 DYSFUNCTION OF BOTH EUSTACHIAN TUBES: ICD-10-CM

## 2025-01-30 DIAGNOSIS — Z23 NEED FOR INFLUENZA VACCINATION: ICD-10-CM

## 2025-01-30 PROBLEM — F33.1 MODERATE EPISODE OF RECURRENT MAJOR DEPRESSIVE DISORDER (HCC): Status: ACTIVE | Noted: 2025-01-30

## 2025-01-30 RX ORDER — OFLOXACIN 3 MG/ML
1 SOLUTION/ DROPS OPHTHALMIC 4 TIMES DAILY
Qty: 5 ML | Refills: 0 | Status: SHIPPED | OUTPATIENT
Start: 2025-01-30 | End: 2025-02-09

## 2025-01-30 SDOH — ECONOMIC STABILITY: FOOD INSECURITY: WITHIN THE PAST 12 MONTHS, THE FOOD YOU BOUGHT JUST DIDN'T LAST AND YOU DIDN'T HAVE MONEY TO GET MORE.: NEVER TRUE

## 2025-01-30 SDOH — ECONOMIC STABILITY: FOOD INSECURITY: WITHIN THE PAST 12 MONTHS, YOU WORRIED THAT YOUR FOOD WOULD RUN OUT BEFORE YOU GOT MONEY TO BUY MORE.: NEVER TRUE

## 2025-01-30 ASSESSMENT — PATIENT HEALTH QUESTIONNAIRE - PHQ9
8. MOVING OR SPEAKING SO SLOWLY THAT OTHER PEOPLE COULD HAVE NOTICED. OR THE OPPOSITE, BEING SO FIGETY OR RESTLESS THAT YOU HAVE BEEN MOVING AROUND A LOT MORE THAN USUAL: NOT AT ALL
SUM OF ALL RESPONSES TO PHQ QUESTIONS 1-9: 0
SUM OF ALL RESPONSES TO PHQ QUESTIONS 1-9: 0
5. POOR APPETITE OR OVEREATING: NOT AT ALL
7. TROUBLE CONCENTRATING ON THINGS, SUCH AS READING THE NEWSPAPER OR WATCHING TELEVISION: NOT AT ALL
3. TROUBLE FALLING OR STAYING ASLEEP: NOT AT ALL
DEPRESSION UNABLE TO ASSESS: FUNCTIONAL CAPACITY MOTIVATION LIMITS ACCURACY
4. FEELING TIRED OR HAVING LITTLE ENERGY: NOT AT ALL
9. THOUGHTS THAT YOU WOULD BE BETTER OFF DEAD, OR OF HURTING YOURSELF: NOT AT ALL
SUM OF ALL RESPONSES TO PHQ9 QUESTIONS 1 & 2: 0
2. FEELING DOWN, DEPRESSED OR HOPELESS: NOT AT ALL
6. FEELING BAD ABOUT YOURSELF - OR THAT YOU ARE A FAILURE OR HAVE LET YOURSELF OR YOUR FAMILY DOWN: NOT AT ALL
SUM OF ALL RESPONSES TO PHQ QUESTIONS 1-9: 0
1. LITTLE INTEREST OR PLEASURE IN DOING THINGS: NOT AT ALL
SUM OF ALL RESPONSES TO PHQ QUESTIONS 1-9: 0
10. IF YOU CHECKED OFF ANY PROBLEMS, HOW DIFFICULT HAVE THESE PROBLEMS MADE IT FOR YOU TO DO YOUR WORK, TAKE CARE OF THINGS AT HOME, OR GET ALONG WITH OTHER PEOPLE: NOT DIFFICULT AT ALL

## 2025-01-30 ASSESSMENT — ENCOUNTER SYMPTOMS
PHOTOPHOBIA: 0
EYE PAIN: 1
SINUS PAIN: 0
EYE REDNESS: 1
RHINORRHEA: 0
EYE ITCHING: 0
EYE DISCHARGE: 1

## 2025-01-30 NOTE — PROGRESS NOTES
Laney Pineda (:  1990) is a 34 y.o. female,Established patient, here for evaluation of the following chief complaint(s):  Conjunctivitis (Right eye, x's 3 days, complains it is itchy ) and Ear Pain (Right )         Assessment & Plan  Bacterial conjunctivitis    Wash hands after touching eyes, warm wash clothes to soothe irritation, follow up if symptoms  do not improve    Orders:    ofloxacin (OCUFLOX) 0.3 % solution; Place 1 drop into the right eye 4 times daily for 10 days    Dysfunction of both eustachian tubes    Start nasal spray, one spray in each nostril twice per day. Follow up if symptoms do not improve           Return for routine follow up for medication due in May.       Subjective   HPI  Conjunctivitis  Timing: three days  Location: right eye  Characteristics: red conjunctiva, clear to yellow drainage  Niece and nephew both had pink eye two weeks ago  Tx: clear eyes    Otalgia  Location: right ear  Timing: three days  Associated symptoms: ear pressure, popping sensation  Denies: other sick symptoms      Review of Systems   HENT:  Positive for ear pain. Negative for congestion, hearing loss, postnasal drip, rhinorrhea and sinus pain.    Eyes:  Positive for pain, discharge and redness. Negative for photophobia, itching and visual disturbance.          Objective   Physical Exam  Vitals reviewed.   Constitutional:       Appearance: Normal appearance.   HENT:      Head: Normocephalic and atraumatic.      Right Ear: Tympanic membrane is bulging.      Left Ear: Tympanic membrane is bulging.      Nose: No congestion.   Eyes:      General: Lids are normal.         Right eye: No discharge or hordeolum.         Left eye: No discharge or hordeolum.      Conjunctiva/sclera:      Right eye: Right conjunctiva is injected.   Cardiovascular:      Rate and Rhythm: Normal rate and regular rhythm.      Heart sounds: Normal heart sounds.   Pulmonary:      Effort: Pulmonary effort is normal.      Breath

## 2025-02-21 ENCOUNTER — PATIENT MESSAGE (OUTPATIENT)
Dept: FAMILY MEDICINE CLINIC | Age: 35
End: 2025-02-21

## 2025-03-21 DIAGNOSIS — K44.9 HIATAL HERNIA WITH GERD: ICD-10-CM

## 2025-03-21 DIAGNOSIS — K21.9 HIATAL HERNIA WITH GERD: ICD-10-CM

## 2025-03-21 RX ORDER — DICYCLOMINE HCL 20 MG
20 TABLET ORAL 4 TIMES DAILY
Qty: 360 TABLET | Refills: 1 | Status: SHIPPED | OUTPATIENT
Start: 2025-03-21

## 2025-03-21 NOTE — TELEPHONE ENCOUNTER
Refill Request     CONFIRM preferred pharmacy with the patient.    If Mail Order Rx - Pend for 90 day refill.      Last Seen: Last Seen Department: 1/30/2025  Last Seen by PCP: 1/30/2025    Last Written: 11/15/24 360 with 1 refill     If no future appointment scheduled:  Review the last OV with PCP and review information for follow-up visit,  Route STAFF MESSAGE with patient name to the  Pool for scheduling with the following information:            -  Timing of next visit           -  Visit type ie Physical, OV, etc           -  Diagnoses/Reason ie. COPD, HTN - Do not use MEDICATION, Follow-up or CHECK UP - Give reason for visit      Next Appointment:   Future Appointments   Date Time Provider Department Center   5/14/2025 11:30 AM Ashley Patel PA [facility]       Message sent to  to schedule appt with patient?  NO      Requested Prescriptions     Pending Prescriptions Disp Refills    dicyclomine (BENTYL) 20 MG tablet [Pharmacy Med Name: DICYCLOMINE 20 MG TABLET] 360 tablet 1     Sig: TAKE 1 TABLET BY MOUTH FOUR TIMES A DAY

## 2025-04-23 ENCOUNTER — OFFICE VISIT (OUTPATIENT)
Dept: FAMILY MEDICINE CLINIC | Age: 35
End: 2025-04-23
Payer: MEDICAID

## 2025-04-23 ENCOUNTER — PATIENT MESSAGE (OUTPATIENT)
Dept: FAMILY MEDICINE CLINIC | Age: 35
End: 2025-04-23

## 2025-04-23 VITALS
HEART RATE: 84 BPM | DIASTOLIC BLOOD PRESSURE: 76 MMHG | BODY MASS INDEX: 48.87 KG/M2 | TEMPERATURE: 97.5 F | WEIGHT: 293 LBS | SYSTOLIC BLOOD PRESSURE: 110 MMHG | OXYGEN SATURATION: 98 %

## 2025-04-23 DIAGNOSIS — E86.0 MILD DEHYDRATION: ICD-10-CM

## 2025-04-23 DIAGNOSIS — R17 ELEVATED BILIRUBIN: Primary | ICD-10-CM

## 2025-04-23 DIAGNOSIS — R68.89 FLU-LIKE SYMPTOMS: Primary | ICD-10-CM

## 2025-04-23 DIAGNOSIS — A08.4 VIRAL GASTROENTERITIS: ICD-10-CM

## 2025-04-23 PROCEDURE — 1036F TOBACCO NON-USER: CPT | Performed by: PHYSICIAN ASSISTANT

## 2025-04-23 PROCEDURE — G8417 CALC BMI ABV UP PARAM F/U: HCPCS | Performed by: PHYSICIAN ASSISTANT

## 2025-04-23 PROCEDURE — G8427 DOCREV CUR MEDS BY ELIG CLIN: HCPCS | Performed by: PHYSICIAN ASSISTANT

## 2025-04-23 PROCEDURE — 99213 OFFICE O/P EST LOW 20 MIN: CPT | Performed by: PHYSICIAN ASSISTANT

## 2025-04-23 ASSESSMENT — ENCOUNTER SYMPTOMS
BLOOD IN STOOL: 0
NAUSEA: 1
CONSTIPATION: 0
VOMITING: 1
ABDOMINAL PAIN: 1
DIARRHEA: 1
ABDOMINAL DISTENTION: 0

## 2025-04-23 NOTE — PROGRESS NOTES
Laney Pineda (:  1990) is a 34 y.o. female,Established patient, here for evaluation of the following chief complaint(s):  Vomiting (X's 2 days ), Diarrhea, Chills, and Joint Pain         Assessment & Plan  Flu-like symptoms    Will retest for covid    Orders:    COVID-19    Mild dehydration    Work on increasing fluids with electrolytes, will check lab work    Orders:    Comprehensive Metabolic Panel; Future    CBC; Future    Viral gastroenteritis    May try imodium for symptoms. Advance diet as tolerated, increase electrolytes. If no change in diarrhea by Friday we will have her come in to give a stool sample to rule out bacterial etiology. Pt agrees with this plan           Return if symptoms worsen or fail to improve.       Subjective   HPI  Viral gastroenteritis  Timing: three days  Stopped vomiting at 5 yesterday  Continues to have diarrhea- 7-8 times per day, watery, mustard yellow color, strong odor  Son was just released from the hospital with rheumatic fever- released 2.5 weeks ago  At home covid test was faintly positive  Tx: none    Review of Systems   Constitutional:  Positive for activity change, appetite change, chills, diaphoresis and fatigue. Negative for fever.   Gastrointestinal:  Positive for abdominal pain, diarrhea, nausea and vomiting. Negative for abdominal distention, blood in stool and constipation.   Musculoskeletal:  Positive for arthralgias.   Neurological:  Positive for dizziness, weakness and headaches.          Objective   Physical Exam  Vitals reviewed.   Constitutional:       Appearance: Normal appearance. She is obese.   HENT:      Head: Normocephalic and atraumatic.      Mouth/Throat:      Mouth: Mucous membranes are dry.   Cardiovascular:      Rate and Rhythm: Normal rate and regular rhythm.      Heart sounds: Normal heart sounds.   Pulmonary:      Effort: Pulmonary effort is normal.      Breath sounds: Normal breath sounds.   Abdominal:      General: Abdomen is

## 2025-04-24 ENCOUNTER — HOSPITAL ENCOUNTER (OUTPATIENT)
Dept: ULTRASOUND IMAGING | Age: 35
Discharge: HOME | End: 2025-04-24
Payer: MEDICAID

## 2025-04-24 ENCOUNTER — RESULTS FOLLOW-UP (OUTPATIENT)
Dept: FAMILY MEDICINE CLINIC | Age: 35
End: 2025-04-24

## 2025-04-24 DIAGNOSIS — R17 ELEVATED BILIRUBIN: Primary | ICD-10-CM

## 2025-04-24 DIAGNOSIS — R17 ELEVATED BILIRUBIN: ICD-10-CM

## 2025-04-24 LAB
ALBUMIN SERPL-MCNC: 4.5 G/DL (ref 3.4–5)
ALBUMIN/GLOB SERPL: 1.7 {RATIO} (ref 1.1–2.2)
ALP SERPL-CCNC: 72 U/L (ref 40–129)
ALT SERPL-CCNC: 18 U/L (ref 10–40)
ANION GAP SERPL CALCULATED.3IONS-SCNC: 15 MMOL/L (ref 3–16)
AST SERPL-CCNC: 17 U/L (ref 15–37)
BILIRUB SERPL-MCNC: 2.3 MG/DL (ref 0–1)
BUN SERPL-MCNC: 12 MG/DL (ref 7–20)
CALCIUM SERPL-MCNC: 9 MG/DL (ref 8.3–10.6)
CHLORIDE SERPL-SCNC: 103 MMOL/L (ref 99–110)
CO2 SERPL-SCNC: 22 MMOL/L (ref 21–32)
CREAT SERPL-MCNC: 0.6 MG/DL (ref 0.6–1.1)
DEPRECATED RDW RBC AUTO: 13.7 % (ref 12.4–15.4)
GFR SERPLBLD CREATININE-BSD FMLA CKD-EPI: >90 ML/MIN/{1.73_M2}
GLUCOSE SERPL-MCNC: 98 MG/DL (ref 70–99)
HCT VFR BLD AUTO: 40.8 % (ref 36–48)
HGB BLD-MCNC: 14.4 G/DL (ref 12–16)
MCH RBC QN AUTO: 29.5 PG (ref 26–34)
MCHC RBC AUTO-ENTMCNC: 35.3 G/DL (ref 31–36)
MCV RBC AUTO: 83.6 FL (ref 80–100)
PLATELET # BLD AUTO: 291 K/UL (ref 135–450)
PMV BLD AUTO: 8.4 FL (ref 5–10.5)
POTASSIUM SERPL-SCNC: 3.5 MMOL/L (ref 3.5–5.1)
PROT SERPL-MCNC: 7.2 G/DL (ref 6.4–8.2)
RBC # BLD AUTO: 4.88 M/UL (ref 4–5.2)
SARS-COV-2 N GENE RESP QL NAA+PROBE: NOT DETECTED
SODIUM SERPL-SCNC: 140 MMOL/L (ref 136–145)
WBC # BLD AUTO: 6.1 K/UL (ref 4–11)

## 2025-04-24 PROCEDURE — 76705 ECHO EXAM OF ABDOMEN: CPT

## 2025-04-24 RX ORDER — CYCLOBENZAPRINE HCL 10 MG
10 TABLET ORAL 3 TIMES DAILY PRN
Qty: 21 TABLET | Refills: 0 | Status: SHIPPED | OUTPATIENT
Start: 2025-04-24 | End: 2025-05-04

## 2025-06-02 ENCOUNTER — OFFICE VISIT (OUTPATIENT)
Dept: FAMILY MEDICINE CLINIC | Age: 35
End: 2025-06-02
Payer: MEDICAID

## 2025-06-02 VITALS
SYSTOLIC BLOOD PRESSURE: 114 MMHG | BODY MASS INDEX: 45.99 KG/M2 | HEIGHT: 67 IN | OXYGEN SATURATION: 99 % | DIASTOLIC BLOOD PRESSURE: 74 MMHG | WEIGHT: 293 LBS | HEART RATE: 82 BPM

## 2025-06-02 DIAGNOSIS — F41.9 ANXIETY AND DEPRESSION: ICD-10-CM

## 2025-06-02 DIAGNOSIS — F32.A ANXIETY AND DEPRESSION: ICD-10-CM

## 2025-06-02 DIAGNOSIS — F41.0 PANIC DISORDER: ICD-10-CM

## 2025-06-02 DIAGNOSIS — H10.11 ALLERGIC CONJUNCTIVITIS OF RIGHT EYE: ICD-10-CM

## 2025-06-02 DIAGNOSIS — F33.1 MODERATE EPISODE OF RECURRENT MAJOR DEPRESSIVE DISORDER (HCC): ICD-10-CM

## 2025-06-02 DIAGNOSIS — R00.2 PALPITATIONS: ICD-10-CM

## 2025-06-02 DIAGNOSIS — R29.818 SUSPECTED SLEEP APNEA: Primary | ICD-10-CM

## 2025-06-02 DIAGNOSIS — R17 ELEVATED BILIRUBIN: ICD-10-CM

## 2025-06-02 DIAGNOSIS — E83.42 HYPOMAGNESEMIA: ICD-10-CM

## 2025-06-02 DIAGNOSIS — F51.04 PSYCHOPHYSIOLOGICAL INSOMNIA: ICD-10-CM

## 2025-06-02 PROCEDURE — 1036F TOBACCO NON-USER: CPT | Performed by: PHYSICIAN ASSISTANT

## 2025-06-02 PROCEDURE — G8427 DOCREV CUR MEDS BY ELIG CLIN: HCPCS | Performed by: PHYSICIAN ASSISTANT

## 2025-06-02 PROCEDURE — G8417 CALC BMI ABV UP PARAM F/U: HCPCS | Performed by: PHYSICIAN ASSISTANT

## 2025-06-02 PROCEDURE — 99214 OFFICE O/P EST MOD 30 MIN: CPT | Performed by: PHYSICIAN ASSISTANT

## 2025-06-02 RX ORDER — CITALOPRAM HYDROBROMIDE 40 MG/1
40 TABLET ORAL DAILY
Qty: 90 TABLET | Refills: 1 | Status: SHIPPED | OUTPATIENT
Start: 2025-06-02

## 2025-06-02 RX ORDER — OLOPATADINE HYDROCHLORIDE 2 MG/ML
1 SOLUTION OPHTHALMIC DAILY
Qty: 2.5 ML | Refills: 0 | Status: SHIPPED | OUTPATIENT
Start: 2025-06-02

## 2025-06-02 RX ORDER — BUSPIRONE HYDROCHLORIDE 10 MG/1
10 TABLET ORAL 2 TIMES DAILY
Qty: 180 TABLET | Refills: 1 | Status: SHIPPED | OUTPATIENT
Start: 2025-06-02

## 2025-06-02 RX ORDER — HYDROXYZINE HYDROCHLORIDE 25 MG/1
25 TABLET, FILM COATED ORAL NIGHTLY
Qty: 90 TABLET | Refills: 1 | Status: SHIPPED | OUTPATIENT
Start: 2025-06-02

## 2025-06-02 ASSESSMENT — ENCOUNTER SYMPTOMS
NAUSEA: 0
BLOOD IN STOOL: 0
ABDOMINAL PAIN: 0
RECTAL PAIN: 0
ANAL BLEEDING: 0
SHORTNESS OF BREATH: 1
CONSTIPATION: 0
COUGH: 1
CHEST TIGHTNESS: 1
ABDOMINAL DISTENTION: 0
DIARRHEA: 1
VOMITING: 0
WHEEZING: 0

## 2025-06-02 NOTE — ASSESSMENT & PLAN NOTE
Chronic, at goal (stable), continue with celexa, follow up in 1 year    Orders:    citalopram (CELEXA) 40 MG tablet; Take 1 tablet by mouth daily

## 2025-06-02 NOTE — PROGRESS NOTES
Laney Pineda (:  1990) is a 34 y.o. female,Established patient, here for evaluation of the following chief complaint(s):  Anxiety         Assessment & Plan  Suspected sleep apnea    Suspect fatigue is related to undiagnosed sleep apnea, we will send her to sleep medicine for further evaluation    Orders:    Lucy Vinson, Sleep Medicine, [facility]    Palpitations    Reviewed recent labs, will check mag and thyroid. 24 hour holter monitor. Further recommendations to follow    Orders:    Magnesium; Future    Cardiac holter monitor (1 day-2 day); Future    TSH; Future    T4, Free; Future    Moderate episode of recurrent major depressive disorder (HCC)   Chronic, at goal (stable), continue with celexa, follow up in 1 year    Orders:    citalopram (CELEXA) 40 MG tablet; Take 1 tablet by mouth daily    Panic disorder   Chronic, at goal (stable), continue with buspar BID, follow up in one year    Orders:    busPIRone (BUSPAR) 10 MG tablet; Take 1 tablet by mouth 2 times daily    Psychophysiological insomnia   Chronic, at goal (stable), continue with hydroxyzine, follow up in 1 year    Orders:    hydrOXYzine HCl (ATARAX) 25 MG tablet; Take 1 tablet by mouth nightly    Allergic conjunctivitis of right eye   Chronic, not at goal (unstable), trial pataday for discharge, if no improvement,  I would recommend that she follow up with her eye doctor    Orders:    olopatadine (PATADAY) 0.2 % SOLN ophthalmic solution; Place 1 drop into the right eye daily      Return in about 1 year (around 2026) for Mood.       Subjective   HPI  Anxiety and depression:  Current medication: celexa, taking buspar BID, hydroxyzine at night  Side effects: none  Current symptoms: depressed, fatigued, unmotivated, unable to get organized, irritable,, unable to complete tasks, disorganization  Denies: si/hi, behavioral issues, lori, mood lability, anxiety  Has never had formal evaluation for ADHD  Stressors: pt has  22

## 2025-06-02 NOTE — ASSESSMENT & PLAN NOTE
Chronic, at goal (stable), continue with buspar BID, follow up in one year    Orders:    busPIRone (BUSPAR) 10 MG tablet; Take 1 tablet by mouth 2 times daily     Post-Care Instructions: I reviewed with the patient in detail post-care instructions. Patient is not to engage in any heavy lifting, exercise, or swimming for the next 14 days. Should the patient develop any fevers, chills, bleeding, severe pain patient will contact the office immediately.

## 2025-06-03 ENCOUNTER — RESULTS FOLLOW-UP (OUTPATIENT)
Dept: FAMILY MEDICINE CLINIC | Age: 35
End: 2025-06-03

## 2025-06-03 LAB
BILIRUB DIRECT SERPL-MCNC: 0.6 MG/DL (ref 0–0.3)
BILIRUB INDIRECT SERPL-MCNC: 1.2 MG/DL (ref 0–1)
BILIRUB SERPL-MCNC: 1.8 MG/DL (ref 0–1)
MAGNESIUM SERPL-MCNC: 1.89 MG/DL (ref 1.8–2.4)
T4 FREE SERPL-MCNC: 1.2 NG/DL (ref 0.9–1.8)
TSH SERPL DL<=0.005 MIU/L-ACNC: 1.65 UIU/ML (ref 0.27–4.2)

## 2025-06-09 ENCOUNTER — HOSPITAL ENCOUNTER (OUTPATIENT)
Age: 35
Discharge: HOME | End: 2025-06-11
Payer: MEDICAID

## 2025-06-09 DIAGNOSIS — R00.2 PALPITATIONS: ICD-10-CM

## 2025-06-09 PROCEDURE — 93225 XTRNL ECG REC<48 HRS REC: CPT

## 2025-06-18 ENCOUNTER — TELEPHONE (OUTPATIENT)
Dept: FAMILY MEDICINE CLINIC | Age: 35
End: 2025-06-18

## 2025-06-18 NOTE — TELEPHONE ENCOUNTER
Staff, can we please follow up with Laney and remind her to return the holter monitor so they can use it for other patients? Thank you!

## 2025-06-20 LAB
ACQUISITION DURATION: NORMAL S
AVERAGE HEART RATE: 82 BPM
HOOKUP DATE: NORMAL
HOOKUP TIME: NORMAL
MAX HEART RATE TIME/DATE: NORMAL
MAX HEART RATE: 125 BPM
MIN HEART RATE TIME/DATE: NORMAL
MIN HEART RATE: 66 BPM
NUMBER OF QRS COMPLEXES: NORMAL
NUMBER OF SUPRAVENTRICULAR COUPLETS: 0
NUMBER OF SUPRAVENTRICULAR ECTOPICS: 0
NUMBER OF SUPRAVENTRICULAR ISOLATED BEATS: 0
NUMBER OF VENTRICULAR BIGEMINAL CYCLES: 0
NUMBER OF VENTRICULAR COUPLETS: 0
NUMBER OF VENTRICULAR ECTOPICS: 1

## 2025-06-25 ENCOUNTER — PATIENT MESSAGE (OUTPATIENT)
Dept: FAMILY MEDICINE CLINIC | Age: 35
End: 2025-06-25

## 2025-06-25 RX ORDER — CYCLOBENZAPRINE HCL 10 MG
10 TABLET ORAL 3 TIMES DAILY PRN
Qty: 21 TABLET | Refills: 0 | Status: SHIPPED | OUTPATIENT
Start: 2025-06-25 | End: 2025-07-05

## 2025-07-30 ENCOUNTER — TELEPHONE (OUTPATIENT)
Dept: SLEEP MEDICINE | Age: 35
End: 2025-07-30

## 2025-07-30 ENCOUNTER — OFFICE VISIT (OUTPATIENT)
Dept: SLEEP MEDICINE | Age: 35
End: 2025-07-30
Payer: MEDICAID

## 2025-07-30 VITALS
BODY MASS INDEX: 45.99 KG/M2 | HEIGHT: 67 IN | SYSTOLIC BLOOD PRESSURE: 120 MMHG | HEART RATE: 74 BPM | DIASTOLIC BLOOD PRESSURE: 90 MMHG | OXYGEN SATURATION: 96 % | WEIGHT: 293 LBS

## 2025-07-30 DIAGNOSIS — G47.30 OBSERVED SLEEP APNEA: ICD-10-CM

## 2025-07-30 DIAGNOSIS — R53.83 OTHER FATIGUE: ICD-10-CM

## 2025-07-30 DIAGNOSIS — E66.01 MORBID OBESITY WITH BMI OF 50.0-59.9, ADULT (HCC): ICD-10-CM

## 2025-07-30 DIAGNOSIS — R06.83 SNORING: Primary | ICD-10-CM

## 2025-07-30 DIAGNOSIS — R51.9 MORNING HEADACHE: ICD-10-CM

## 2025-07-30 PROCEDURE — G8417 CALC BMI ABV UP PARAM F/U: HCPCS | Performed by: NURSE PRACTITIONER

## 2025-07-30 PROCEDURE — G8427 DOCREV CUR MEDS BY ELIG CLIN: HCPCS | Performed by: NURSE PRACTITIONER

## 2025-07-30 PROCEDURE — 99204 OFFICE O/P NEW MOD 45 MIN: CPT | Performed by: NURSE PRACTITIONER

## 2025-07-30 ASSESSMENT — SLEEP AND FATIGUE QUESTIONNAIRES
HOW LIKELY ARE YOU TO NOD OFF OR FALL ASLEEP WHILE SITTING QUIETLY AFTER LUNCH WITHOUT ALCOHOL: MODERATE CHANCE OF DOZING
HOW LIKELY ARE YOU TO NOD OFF OR FALL ASLEEP WHILE SITTING AND TALKING TO SOMEONE: WOULD NEVER DOZE
HOW LIKELY ARE YOU TO NOD OFF OR FALL ASLEEP WHILE LYING DOWN TO REST IN THE AFTERNOON WHEN CIRCUMSTANCES PERMIT: HIGH CHANCE OF DOZING
HOW LIKELY ARE YOU TO NOD OFF OR FALL ASLEEP WHILE SITTING INACTIVE IN A PUBLIC PLACE: WOULD NEVER DOZE
HOW LIKELY ARE YOU TO NOD OFF OR FALL ASLEEP IN A CAR, WHILE STOPPED FOR A FEW MINUTES IN TRAFFIC: WOULD NEVER DOZE
HOW LIKELY ARE YOU TO NOD OFF OR FALL ASLEEP WHILE SITTING AND READING: SLIGHT CHANCE OF DOZING
HOW LIKELY ARE YOU TO NOD OFF OR FALL ASLEEP WHEN YOU ARE A PASSENGER IN A CAR FOR AN HOUR WITHOUT A BREAK: SLIGHT CHANCE OF DOZING
HOW LIKELY ARE YOU TO NOD OFF OR FALL ASLEEP WHILE WATCHING TV: MODERATE CHANCE OF DOZING
ESS TOTAL SCORE: 9

## 2025-07-30 NOTE — PROGRESS NOTES
joint deformity.   Neuro: Awake. Alert. Moves all four extremities.   Psych: Oriented x 3. No anxiety.       DATA:   4/23/2025 Hgb 14.4  6/2/2025 TSH 1.65    Assessment:       Snoring  Observed sleep apnea   Fatigue  Morning headache  Obesity        Plan:      HST to evaluate forsleep related breathing disorder.   Treatment options were discussed with patient if HST reveals DIONNA, including CPAP/APAP therapy, oral appliances and upper airway surgery.  Trial auto CPAP 8-16 cm H2O if HST is positive for DIONNA  Sleep hygiene  Avoidsedatives, alcohol and caffeinated drinks at bed time.  No driving motorized vehicles or operating heavy machinery while fatigue, drowsy or sleepy.   Weight loss is also recommended as a long-term intervention.    Complications of DIONNA if not treated were discussed with patient patient to include systemic hypertension, pulmonary hypertension, cardiovascular morbidities, car accidents and all cause mortality.  Discussed pathophysiology of DIONNA with patient today  Patient education handout provided regarding sleep tips

## 2025-07-30 NOTE — PATIENT INSTRUCTIONS
[facility]                   [facility]                     [address], [address], [place] [address]                  [facility]                   [address], [address], [place] [address]                      Phone: [phone] Fax: [phone]                Dear Sleep Center Patient,     For in-lab testing please, bring with you:  Appropriate nightclothes (pajamas, sweats, etc.), slippers and robe  All medications you will need during you stay, including any breathing medications, nebulizers and metered dose inhalers  Your own toiletries and hairdryer if you wish to shower before you leave  Current photo I.D. and Insurance card  We do not allow any pillows or bed linens from home due to health regulations  -We recommend that you not bring valuables with you to the Sleep Center, as we cannot be responsible for any lost or damaged personal items.  Please be aware that [facility] is a non-smoking facility. There is no smoking allowed anywhere on [facility] property at any time.  Each patient room is a private room with a private bathroom.  The in-lab test itself consist of electrodes and sensors attached to specific areas of your scalp, face, chest and legs. We will also monitor respiratory effort, nasal and oral airflow and oxygen levels. The test will not hurt- it is painless and not invasive in any way.      At home testing when you come to  the rental unit, please bring:   -I.D. and Insurance card  **Please note the Home Sleep Test Units are limited. It is a 1 night rental and must be dropped off the following day to ensure you are not billed a late or replacement fee**    Please refrain from or reduce the use of caffeine and/or alcohol prior to your sleep study and avoid napping the day of your study, as these will affect the accuracy of your test results.  If you are ill the day of your test

## 2025-08-13 ENCOUNTER — HOSPITAL ENCOUNTER (OUTPATIENT)
Dept: SLEEP CENTER | Age: 35
Discharge: HOME | End: 2025-08-15
Payer: MEDICAID

## 2025-08-13 DIAGNOSIS — R06.83 SNORING: ICD-10-CM

## 2025-08-13 DIAGNOSIS — R51.9 MORNING HEADACHE: ICD-10-CM

## 2025-08-13 DIAGNOSIS — R53.83 OTHER FATIGUE: ICD-10-CM

## 2025-08-13 DIAGNOSIS — G47.30 OBSERVED SLEEP APNEA: ICD-10-CM

## 2025-08-13 DIAGNOSIS — E66.01 MORBID OBESITY WITH BMI OF 50.0-59.9, ADULT (HCC): ICD-10-CM

## 2025-08-13 PROCEDURE — 95806 SLEEP STUDY UNATT&RESP EFFT: CPT

## 2025-08-19 PROBLEM — R53.83 OTHER FATIGUE: Status: ACTIVE | Noted: 2025-08-19

## 2025-08-19 PROBLEM — G47.30 OBSERVED SLEEP APNEA: Status: ACTIVE | Noted: 2025-08-19

## 2025-08-19 PROBLEM — R51.9 MORNING HEADACHE: Status: ACTIVE | Noted: 2025-08-19

## 2025-08-19 PROBLEM — E66.01 MORBID OBESITY WITH BMI OF 50.0-59.9, ADULT (HCC): Status: ACTIVE | Noted: 2025-08-19

## 2025-08-19 PROBLEM — R06.83 SNORING: Status: ACTIVE | Noted: 2025-08-19

## 2025-08-20 ENCOUNTER — RESULTS FOLLOW-UP (OUTPATIENT)
Dept: PULMONOLOGY | Age: 35
End: 2025-08-20

## 2025-08-20 DIAGNOSIS — G47.33 MILD OBSTRUCTIVE SLEEP APNEA: Primary | ICD-10-CM

## 2025-08-20 DIAGNOSIS — F51.04 PSYCHOPHYSIOLOGICAL INSOMNIA: ICD-10-CM

## (undated) DEVICE — DEVICE CLSR 10/12MM XL PRT SYS SUT PASS ST DISP CARTER

## (undated) DEVICE — 40583 XL ADVANCED TRENDELENBURG POSITIONING KIT: Brand: 40583 XL ADVANCED TRENDELENBURG POSITIONING KIT

## (undated) DEVICE — CANNULA,OXY,ADULT,SUPERSOFT,W/7'TUB,SC: Brand: MEDLINE INDUSTRIES, INC.

## (undated) DEVICE — STANDARD HYPODERMIC NEEDLE,POLYPROPYLENE HUB: Brand: MONOJECT

## (undated) DEVICE — Device

## (undated) DEVICE — 2.4MM X 10 INCH DRILL-TIP GUIDE WIRE: Brand: ENDOBUTTON

## (undated) DEVICE — DRAPE,U/ SHT,SPLIT,PLAS,STERIL: Brand: MEDLINE

## (undated) DEVICE — TURNOVER KIT: Brand: MEDLINE INDUSTRIES, INC.

## (undated) DEVICE — SOLUTION IV IRRIG 500ML 0.9% SODIUM CHL 2F7123

## (undated) DEVICE — ENDOSCOPIC KIT 2 12 FT OP4 DE2 GWN SYR

## (undated) DEVICE — SUTURE FIBERWIRE SZ 2 W/ TAPERED NEEDLE BLUE L38IN NONABSORB BLU L26.5MM 1/2 CIRCLE AR7200

## (undated) DEVICE — ZIMMER® STERILE DISPOSABLE TOURNIQUET CUFF WITH PLC, DUAL PORT, SINGLE BLADDER, 42 IN. (107 CM)

## (undated) DEVICE — CONMED SCOPE SAVER BITE BLOCK, 20X27 MM: Brand: SCOPE SAVER

## (undated) DEVICE — RELOAD STPL L60MM H1.5-3.6MM REG TISS BLU GRIPPING SURF B

## (undated) DEVICE — BOWL MED L 32OZ PLAS W/ MOLD GRAD EZ OPN PEEL PCH

## (undated) DEVICE — NEEDLE HYPO 22GA L1.5IN BLK POLYPR HUB S STL REG BVL STR

## (undated) DEVICE — BLANKET WRM W29.9XL79.1IN UP BODY FORC AIR MISTRAL-AIR

## (undated) DEVICE — AGENT HEMSTAT W2XL4IN OXIDIZED REGENERATED CELOS ABSRB

## (undated) DEVICE — SPONGE,LAP,4"X18",XR,ST,5/PK,40PK/CS: Brand: MEDLINE INDUSTRIES, INC.

## (undated) DEVICE — SURGICAL SET UP - SURE SET: Brand: MEDLINE INDUSTRIES, INC.

## (undated) DEVICE — GLOVE SURG SZ 75 L12IN THK75MIL DK GRN LTX FREE

## (undated) DEVICE — SUTURE FIBERWIRE SZ 2 L38IN NONABSORBABLE BLU WHT BLK AR7201

## (undated) DEVICE — KNIFE SURG 10MM GRFT DISP FOR ACL RECON

## (undated) DEVICE — SUTURE ETHLN SZ 3-0 L18IN NONABSORBABLE BLK L24MM PS-1 3/8 1663G

## (undated) DEVICE — GLOVE SURG SZ 75 L12IN FNGR THK94MIL STD WHT LTX FREE

## (undated) DEVICE — STERILE POLYISOPRENE POWDER-FREE SURGICAL GLOVES: Brand: PROTEXIS

## (undated) DEVICE — GLOVE SURG SZ 8 L12IN FNGR THK94MIL STD WHT LTX FREE

## (undated) DEVICE — APPLICATOR MEDICATED 10.5 CC SOLUTION HI LT ORNG CHLORAPREP

## (undated) DEVICE — COVER,MAYO STAND,STERILE: Brand: MEDLINE

## (undated) DEVICE — KNOT PUSHER/ PORTAL SKID

## (undated) DEVICE — TROCAR: Brand: KII FIOS FIRST ENTRY

## (undated) DEVICE — SOLUTION ANTIFOG VIS SYS CLEARIFY LAPSCP

## (undated) DEVICE — ANTI-FOG SOLUTION WITH FOAM PAD: Brand: DEVON

## (undated) DEVICE — TROCAR: Brand: KII OPTICAL ACCESS SYSTEM

## (undated) DEVICE — FORCEPS BX L240CM JAW DIA2.8MM L CAP W/ NDL MIC MESH TOOTH

## (undated) DEVICE — BASIC SINGLE BASIN 1-LF: Brand: MEDLINE INDUSTRIES, INC.

## (undated) DEVICE — PACK EXTREMITY XR

## (undated) DEVICE — KIT INSTR TRANSTIBIAL CRUCE W/O SAW BLDE DISP

## (undated) DEVICE — RELOAD STPL H4.1X2MM DIA60MM THCK TISS GRN 6 ROW PWR GST B

## (undated) DEVICE — PLATE ES AD W 9FT CRD 2

## (undated) DEVICE — SURE SET-DOUBLE BASIN-LF: Brand: MEDLINE INDUSTRIES, INC.

## (undated) DEVICE — PUMP SUC IRR TBNG L10FT W/ HNDPC ASSEMB STRYKEFLOW 2

## (undated) DEVICE — APPLICATOR MEDICATED 26 CC SOLUTION HI LT ORNG CHLORAPREP

## (undated) DEVICE — BINDER ABD H12IN FOR 62-74IN WAIST UNIV 4 PNL PREM DSGN E

## (undated) DEVICE — GLOVE ORANGE PI 7   MSG9070

## (undated) DEVICE — RELOAD STPL H1.8-3.8MM REG THCK TISS G 6 ROW GRIPPING SURF

## (undated) DEVICE — BANDAGE COMPR W6INXL12FT SMOOTH FOR LIMB EXSANG ESMARCH

## (undated) DEVICE — GLOVE SURG SZ 65 L12IN FNGR THK94MIL STD WHT LTX FREE

## (undated) DEVICE — TUBING PMP L8FT LNG W/ CONN FOR AR-6400 REDEUCE

## (undated) DEVICE — ACL BLADE, FINE, 9.5 X 25.5 X 0.4 MM: Brand: CONMED

## (undated) DEVICE — ECHELON 60MM REINFORCEMENT: Brand: ECHLEON

## (undated) DEVICE — SUTURE MCRYL SZ 3-0 L27IN ABSRB UD PS-2 3/8 CIR REV CUT NDL MCP427H

## (undated) DEVICE — SOLUTION IRRIG 5L LAC R BG

## (undated) DEVICE — BLADE,STAINLESS-STEEL,11,STRL,DISPOSABLE: Brand: MEDLINE

## (undated) DEVICE — COVER,TABLE,44X90,STERILE: Brand: MEDLINE

## (undated) DEVICE — 5.5 MM ELITE ACROMIOBLASTER                                    STRAIGHT DISPOSABLE BURRS, BRICK                                    RED, 10000 MAXIMUM RPM, PACKAGED 6                                    PER BOX, STERILE

## (undated) DEVICE — YANKAUER,BULB TIP,W/O VENT,RIGID,STERILE: Brand: MEDLINE

## (undated) DEVICE — SYRINGE MED 10ML LUERLOCK TIP W/O SFTY DISP

## (undated) DEVICE — GAUZE,SPONGE,4"X4",16PLY,STRL,LF,10/TRAY: Brand: MEDLINE

## (undated) DEVICE — BLADE ES ELASTOMERIC COAT INSUL DURABLE BEND UPTO 90DEG

## (undated) DEVICE — SYRINGE MED 10ML SLIP TIP BLNT FILL AND LUERLOCK DISP

## (undated) DEVICE — SUTURE VCRL SZ 2-0 L27IN ABSRB UD L26MM SH 1/2 CIR J417H

## (undated) DEVICE — BANDAGE COMPR M W6INXL15YD WHT BGE VELC E MTRX HK AND LOOP

## (undated) DEVICE — PENCIL SMK EVAC ALL IN 1 DSGN ENH VISIBILITY IMPROVED AIR

## (undated) DEVICE — GLOVE SURG SZ 8 L12IN FNGR THK79MIL GRN LTX FREE

## (undated) DEVICE — SUTURE VCRL SZ 0 L36IN ABSRB UD CT-1 L36MM 1/2 CIR TAPR PNT VCP946H

## (undated) DEVICE — ENDO CARRY-ON PROCEDURE KIT INCLUDES SUCTION TUBING, LUBRICANT, GAUZE, BIOHAZARD STICKER, TRANSPORT PAD AND INTERCEPT BEDSIDE KIT.: Brand: ENDO CARRY-ON PROCEDURE KIT

## (undated) DEVICE — ELECTRODE,RADIOTRANSLUCENT,FOAM,3PK: Brand: MEDLINE

## (undated) DEVICE — ADHESIVE SKIN CLSR 0.7ML TOP DERMBND ADV

## (undated) DEVICE — SOLUTION IV 1000ML LAC RINGERS PH 6.5 INJ USP VIAFLX PLAS

## (undated) DEVICE — ELECTRODE PT RET AD L9FT HI MOIST COND ADH HYDRGEL CORDED

## (undated) DEVICE — DRAPE EQUIP C ARM MINI 10000100] TIDI PRODUCTS INC]

## (undated) DEVICE — ARM DRAPE

## (undated) DEVICE — DRAPE,LAP,CHOLE,W/TROUGHS,STERILE: Brand: MEDLINE

## (undated) DEVICE — STAPLER SKIN L440MM 32MM LNG 12 FIRING B FRM PWR + GRIPPING

## (undated) DEVICE — LARGE NEEDLE DRIVER: Brand: ENDOWRIST

## (undated) DEVICE — [HIGH FLOW INSUFFLATOR,  DO NOT USE IF PACKAGE IS DAMAGED,  KEEP DRY,  KEEP AWAY FROM SUNLIGHT,  PROTECT FROM HEAT AND RADIOACTIVE SOURCES.]: Brand: PNEUMOSURE

## (undated) DEVICE — VISIGI 3D®  CALIBRATION SYSTEM  SIZE 36FR STD W/ BULB: Brand: BOEHRINGER® VISIGI 3D™ SLEEVE GASTRECTOMY CALIBRATION SYSTEM, SIZE 36FR W/BULB

## (undated) DEVICE — SYSTEM SMK EVAC LAP TBNG FILTER HSNG BENT STYL PNK SEE CLR

## (undated) DEVICE — SUTURE ETHBND EXCEL SZ 0 L30IN NONABSORBABLE GRN L26MM SH X834H

## (undated) DEVICE — COLUMN DRAPE

## (undated) DEVICE — ELECTROSURGICAL PENCIL ROCKER SWITCH NON COATED BLADE ELECTRODE 10 FT (3 M) CORD HOLSTER: Brand: MEGADYNE

## (undated) DEVICE — DRILL BIT 2.0MM (5/64'') X 128.0MM

## (undated) DEVICE — ZIMMER® STERILE DISPOSABLE TOURNIQUET CUFF WITH PLC, DUAL PORT, SINGLE BLADDER, 30 IN. (76 CM)

## (undated) DEVICE — SYRINGE, LUER LOCK, 60ML: Brand: MEDLINE

## (undated) DEVICE — GLOVE,SURG,SENSICARE,ALOE,LF,PF,7: Brand: MEDLINE

## (undated) DEVICE — GOWN,SIRUS,POLYRNF,BRTHSLV,XLN/XL,20/CS: Brand: MEDLINE

## (undated) DEVICE — MICRO SAGITTAL BLADE, FINE, 9.5 X 25.5 X 0.4 MM: Brand: CONMED

## (undated) DEVICE — TOWEL,OR,DSP,ST,BLUE,STD,8/PK,10PK/CS: Brand: MEDLINE

## (undated) DEVICE — GOWN,SIRUS,POLYRNF,BRTHSLV,LG,30/CS: Brand: MEDLINE

## (undated) DEVICE — CADIERE FORCEPS: Brand: ENDOWRIST

## (undated) DEVICE — SUTURE VCRL SZ 0 L54IN ABSRB VLT W/O NDL POLYGLACTIN 910 J616H

## (undated) DEVICE — GARMENT,MEDLINE,DVT,INT,CALF,LG, GEN2: Brand: MEDLINE

## (undated) DEVICE — BLADELESS OBTURATOR, LONG: Brand: WECK VISTA

## (undated) DEVICE — GOWN,SIRUS,POLYRNF,BRTHSLV,XL,30/CS: Brand: MEDLINE

## (undated) DEVICE — SUTURE VCRL SZ 4-0 L18IN ABSRB UD L19MM PS-2 3/8 CIR PRIM J496H

## (undated) DEVICE — CANNULA SEAL

## (undated) DEVICE — NEEDLE INSUF L150MM DIA2MM DISP FOR PNEUMOPERI ENDOPATH

## (undated) DEVICE — GLOVE SURG SZ 65 L12IN FNGR THK79MIL GRN LTX FREE

## (undated) DEVICE — STOCKINETTE,IMPERVIOUS,12X48,STERILE: Brand: MEDLINE

## (undated) DEVICE — STRAP ARMBRD W1.5XL32IN FOAM STR YET SFT W/ HK AND LOOP

## (undated) DEVICE — SUTURE VCRL + SZ 2-0 L18IN ABSRB UD CT1 L36MM 1/2 CIR VCP839D

## (undated) DEVICE — GOWN,SIRUS,NON REINFRCD,LARGE,SET IN SL: Brand: MEDLINE

## (undated) DEVICE — 3M™ STERI-STRIP™ REINFORCED ADHESIVE SKIN CLOSURES, R1547, 1/2 IN X 4 IN (12 MM X 100 MM), 6 STRIPS/ENVELOPE: Brand: 3M™ STERI-STRIP™

## (undated) DEVICE — WEREWOLF FLOW 90 COBLATION WAND: Brand: COBLATION

## (undated) DEVICE — SUTURE STRATAFIX SPRL SZ 3-0 L12IN ABSRB UD FS-1 L30X30CM SXMP2B410

## (undated) DEVICE — 4.5 MM FULL RADIUS STRAIGHT                                    BLADES, POWER/EP-1, YELLOW, PACKAGED                                    6 PER BOX, STERILE: Brand: DYONICS

## (undated) DEVICE — NEEDLE SPNL L3.5IN PNK HUB S STL REG WALL FIT STYL W/ QNCKE

## (undated) DEVICE — DRESSING,GAUZE,XEROFORM,CURAD,1"X8",ST: Brand: CURAD

## (undated) DEVICE — FORCEP BX STD CAP 240CM RAD JAW 4

## (undated) DEVICE — LAPAROSCOPIC SCISSORS: Brand: EPIX LAPAROSCOPIC SCISSORS

## (undated) DEVICE — VESSEL SEALER EXTEND: Brand: ENDOWRIST

## (undated) DEVICE — 60 ML SYRINGE,CATHETER TIP: Brand: MONOJECT

## (undated) DEVICE — JEWISH HOSPITAL TURNOVER KIT: Brand: MEDLINE INDUSTRIES, INC.